# Patient Record
Sex: FEMALE | Race: WHITE | NOT HISPANIC OR LATINO | Employment: OTHER | URBAN - METROPOLITAN AREA
[De-identification: names, ages, dates, MRNs, and addresses within clinical notes are randomized per-mention and may not be internally consistent; named-entity substitution may affect disease eponyms.]

---

## 2000-11-27 LAB — HCV AB SER-ACNC: NEGATIVE

## 2018-07-22 ENCOUNTER — OFFICE VISIT (OUTPATIENT)
Dept: URGENT CARE | Facility: CLINIC | Age: 68
End: 2018-07-22
Payer: MEDICARE

## 2018-07-22 VITALS
SYSTOLIC BLOOD PRESSURE: 197 MMHG | TEMPERATURE: 98.6 F | HEIGHT: 62 IN | WEIGHT: 145 LBS | BODY MASS INDEX: 26.68 KG/M2 | RESPIRATION RATE: 18 BRPM | OXYGEN SATURATION: 98 % | DIASTOLIC BLOOD PRESSURE: 81 MMHG | HEART RATE: 79 BPM

## 2018-07-22 DIAGNOSIS — L23.7 ALLERGIC CONTACT DERMATITIS DUE TO PLANTS, EXCEPT FOOD: Primary | ICD-10-CM

## 2018-07-22 RX ORDER — FERROUS SULFATE 325(65) MG
325 TABLET ORAL
COMMUNITY

## 2018-07-22 RX ORDER — MOMETASONE FUROATE 1 MG/G
CREAM TOPICAL DAILY
Qty: 30 G | Refills: 0 | Status: SHIPPED | OUTPATIENT
Start: 2018-07-22 | End: 2018-07-27

## 2018-07-22 RX ORDER — CITALOPRAM 20 MG/1
10 TABLET ORAL DAILY
COMMUNITY
End: 2019-12-27 | Stop reason: SDUPTHER

## 2018-07-22 RX ORDER — METHYLPREDNISOLONE 4 MG/1
TABLET ORAL
Qty: 21 TABLET | Refills: 0 | Status: SHIPPED | OUTPATIENT
Start: 2018-07-22 | End: 2019-08-27 | Stop reason: ALTCHOICE

## 2018-07-22 NOTE — PROGRESS NOTES
Assessment/Plan:  Follow-up with your family physician if not better in 5 days  If the rash starts to involve the eye follow-up sooner  Diagnoses and all orders for this visit:    Allergic contact dermatitis due to plants, except food    Other orders  -     ferrous sulfate 325 (65 Fe) mg tablet; Take 325 mg by mouth daily with breakfast  -     citalopram (CeleXA) 20 mg tablet; Take 10 mg by mouth daily          Subjective:      Patient ID: Cresencio Villarreal is a 79 y o  female  Patient presents with:  Rash: Poison Ivy rash on left side of face and Inner arms since Friday             The following portions of the patient's history were reviewed and updated as appropriate: allergies, current medications, past family history, past medical history, past social history, past surgical history and problem list     Review of Systems   Constitutional: Negative  HENT: Negative  Eyes: Negative  Respiratory: Negative  Cardiovascular: Negative  Gastrointestinal: Negative  Endocrine: Negative  Genitourinary: Negative  Musculoskeletal: Negative  Skin: Positive for rash  Allergic/Immunologic: Negative  Neurological: Negative  Hematological: Negative  Psychiatric/Behavioral: Negative  All other systems reviewed and are negative  Objective:      BP (!) 197/81   Pulse 79   Temp 98 6 °F (37 °C)   Resp 18   Ht 5' 2" (1 575 m)   Wt 65 8 kg (145 lb)   SpO2 98%   BMI 26 52 kg/m²          Physical Exam   Constitutional: She is oriented to person, place, and time  She appears well-developed and well-nourished  HENT:   Head: Normocephalic and atraumatic  Right Ear: External ear normal    Left Ear: External ear normal    Nose: Nose normal    Mouth/Throat: Oropharynx is clear and moist    Eyes: Conjunctivae and EOM are normal  Pupils are equal, round, and reactive to light  Neck: Normal range of motion  Neck supple     Cardiovascular: Normal rate, regular rhythm and normal heart sounds  Pulmonary/Chest: Effort normal and breath sounds normal    Abdominal: Soft  Bowel sounds are normal    Musculoskeletal: Normal range of motion  Neurological: She is alert and oriented to person, place, and time  She has normal reflexes  Skin: Skin is warm and dry  Rash noted  Raised erythematous rash on the left side of her face and around the left eye  Also some raised areas on her anterior chest wall  No blistering no discharge  Psychiatric: She has a normal mood and affect  Her behavior is normal    Nursing note and vitals reviewed

## 2019-06-27 ENCOUNTER — TELEPHONE (OUTPATIENT)
Dept: FAMILY MEDICINE CLINIC | Facility: CLINIC | Age: 69
End: 2019-06-27

## 2019-08-27 ENCOUNTER — OFFICE VISIT (OUTPATIENT)
Dept: FAMILY MEDICINE CLINIC | Facility: CLINIC | Age: 69
End: 2019-08-27
Payer: COMMERCIAL

## 2019-08-27 VITALS
SYSTOLIC BLOOD PRESSURE: 128 MMHG | BODY MASS INDEX: 23.04 KG/M2 | DIASTOLIC BLOOD PRESSURE: 78 MMHG | HEIGHT: 63 IN | WEIGHT: 130 LBS

## 2019-08-27 DIAGNOSIS — K21.00 REFLUX ESOPHAGITIS: ICD-10-CM

## 2019-08-27 DIAGNOSIS — Z00.00 MEDICARE ANNUAL WELLNESS VISIT, SUBSEQUENT: Primary | ICD-10-CM

## 2019-08-27 DIAGNOSIS — Z12.31 ENCOUNTER FOR SCREENING MAMMOGRAM FOR BREAST CANCER: ICD-10-CM

## 2019-08-27 DIAGNOSIS — K21.00 GASTRO-ESOPHAGEAL REFLUX DISEASE WITH ESOPHAGITIS: Chronic | ICD-10-CM

## 2019-08-27 DIAGNOSIS — F41.1 GENERALIZED ANXIETY DISORDER: Chronic | ICD-10-CM

## 2019-08-27 DIAGNOSIS — R13.14 PHARYNGOESOPHAGEAL DYSPHAGIA: Chronic | ICD-10-CM

## 2019-08-27 DIAGNOSIS — J30.1 SEASONAL ALLERGIC RHINITIS DUE TO POLLEN: Chronic | ICD-10-CM

## 2019-08-27 DIAGNOSIS — D50.8 IRON DEFICIENCY ANEMIA SECONDARY TO INADEQUATE DIETARY IRON INTAKE: Chronic | ICD-10-CM

## 2019-08-27 DIAGNOSIS — E78.00 HYPERCHOLESTEROLEMIA: ICD-10-CM

## 2019-08-27 PROBLEM — L23.7 ALLERGIC CONTACT DERMATITIS DUE TO PLANTS, EXCEPT FOOD: Status: RESOLVED | Noted: 2018-07-22 | Resolved: 2019-08-27

## 2019-08-27 LAB
ALBUMIN SERPL BCP-MCNC: 4.6 G/DL (ref 3.5–5)
ANION GAP SERPL CALCULATED.3IONS-SCNC: 4 MMOL/L (ref 4–13)
BUN SERPL-MCNC: 13 MG/DL (ref 5–25)
CALCIUM SERPL-MCNC: 10.1 MG/DL (ref 8.3–10.1)
CHLORIDE SERPL-SCNC: 109 MMOL/L (ref 100–108)
CHOLEST SERPL-MCNC: 242 MG/DL (ref 50–200)
CO2 SERPL-SCNC: 29 MMOL/L (ref 21–32)
CREAT SERPL-MCNC: 0.68 MG/DL (ref 0.6–1.3)
GFR SERPL CREATININE-BSD FRML MDRD: 90 ML/MIN/1.73SQ M
GLUCOSE P FAST SERPL-MCNC: 78 MG/DL (ref 65–99)
HDLC SERPL-MCNC: 44 MG/DL (ref 40–60)
LDLC SERPL CALC-MCNC: 162 MG/DL (ref 0–100)
PHOSPHATE SERPL-MCNC: 3.4 MG/DL (ref 2.3–4.1)
POTASSIUM SERPL-SCNC: 4.8 MMOL/L (ref 3.5–5.3)
SODIUM SERPL-SCNC: 142 MMOL/L (ref 136–145)
TRIGL SERPL-MCNC: 180 MG/DL

## 2019-08-27 PROCEDURE — 36415 COLL VENOUS BLD VENIPUNCTURE: CPT | Performed by: FAMILY MEDICINE

## 2019-08-27 PROCEDURE — 1101F PT FALLS ASSESS-DOCD LE1/YR: CPT | Performed by: FAMILY MEDICINE

## 2019-08-27 PROCEDURE — 80061 LIPID PANEL: CPT | Performed by: FAMILY MEDICINE

## 2019-08-27 PROCEDURE — 80069 RENAL FUNCTION PANEL: CPT | Performed by: FAMILY MEDICINE

## 2019-08-27 PROCEDURE — G0438 PPPS, INITIAL VISIT: HCPCS | Performed by: FAMILY MEDICINE

## 2019-08-27 PROCEDURE — 99214 OFFICE O/P EST MOD 30 MIN: CPT | Performed by: FAMILY MEDICINE

## 2019-08-27 NOTE — PROGRESS NOTES
Assessment and Plan:     Problem List Items Addressed This Visit     None      Visit Diagnoses     Encounter for screening mammogram for breast cancer    -  Primary         History of Present Illness:     Patient presents for Medicare Annual Wellness visit    Patient Care Team:  Mike Kinsey MD as PCP - General (Family Medicine)     Problem List:     Patient Active Problem List   Diagnosis    Allergic contact dermatitis due to plants, except food      Past Medical and Surgical History:     Past Medical History:   Diagnosis Date    Depression     Esophageal stricture     History of gastroesophageal reflux (GERD)     Iron deficiency anemia      Past Surgical History:   Procedure Laterality Date    COLONOSCOPY  2012    GALLBLADDER SURGERY      HYSTERECTOMY      OTHER SURGICAL HISTORY      Endoscopy of stricture 2010, 2012, 5/2014 - Esophageal ulcer; 7/2014 - Esophageal ulcer; 10/2015 (Dr Edin Palencia) - Esophageal stricture; 10/2016; 11/2017 - Stricture      Family History:     Family History   Problem Relation Age of Onset   Razo Stroke Mother     Hypertension Mother       Social History:     Social History     Tobacco Use   Smoking Status Never Smoker   Smokeless Tobacco Never Used     Social History     Substance and Sexual Activity   Alcohol Use Not Currently    Comment: Denies alcohol use - As per [facility]      Social History     Substance and Sexual Activity   Drug Use Not Currently      Medications and Allergies:     Current Outpatient Medications   Medication Sig Dispense Refill    citalopram (CeleXA) 20 mg tablet Take 10 mg by mouth daily      ferrous sulfate 325 (65 Fe) mg tablet Take 325 mg by mouth daily with breakfast      mometasone (ELOCON) 0 1 % cream Apply topically daily for 5 days 30 g 0     No current facility-administered medications for this visit        No Known Allergies   Immunizations:     Immunization History   Administered Date(s) Administered    INFLUENZA 10/29/2007, 03/04/2010, 09/08/2010, 12/01/2011, 08/23/2012, 12/06/2013, 10/02/2014, 11/23/2015, 11/28/2016, 12/01/2017    Tdap 06/02/2014      Medicare Screening Tests and Risk Assessments:     Mahogany Pineda is here for her Subsequent Wellness visit  Last Medicare Wellness visit information reviewed, patient interviewed and updates made to the record today  Health Risk Assessment:  Patient rates overall health as good  Patient feels that their physical health rating is Same  Eyesight was rated as Same  Hearing was rated as Same  Patient feels that their emotional and mental health rating is Same  Pain experienced by patient in the last 7 days has been None  Patient states that she has experienced no weight loss or gain in last 6 months  (Additional comments: No issues)    Emotional/Mental Health:  Patient has not been feeling nervous/anxious  PHQ-9 Depression Screening:    Frequency of the following problems over the past two weeks:      1  Little interest or pleasure in doing things: 0 - not at all      2  Feeling down, depressed, or hopeless: 0 - not at all  PHQ-2 Score: 0          Broken Bones/Falls: Fall Risk Assessment:    In the past year, patient has experienced: No history of falling in past year          Bladder/Bowel:  Patient has not leaked urine accidently in the last six months  Patient reports no loss of bowel control  (Additional Comments: No issues)    Immunizations:  Patient has had a flu vaccination within the last year  Patient has not received a pneumonia shot  Patient has not received a shingles shot  Patient has received tetanus/diphtheria shot  (Additional Comments: Advised to get the pneumonia shot and also to consider the shingles vaccine)    Home Safety:  Patient does not have trouble with stairs inside or outside of their home  Patient currently reports that there are no safety hazards present in home, working smoke alarms, no working carbon monoxide detectors    (Additional Comments: No issues)    Preventative Screenings:   No breast cancer screening performed, colon cancer screen completed, cholesterol screen completed, glaucoma eye exam completed, (Additional Comments: Gave slip for mammogram   Continue to have reports from my doctor sent)    Nutrition:  Current diet: Regular with servings of the following:  (Additional Comments: No issues)    Medications:  Patient is currently taking over-the-counter supplements  List of OTC medications includes: Iron supplement and multivitamin  Patient is able to manage medications  Lifestyle Choices:  Patient reports no tobacco use  Patient has not smoked or used tobacco in the past   Patient reports no alcohol use  Patient drives a vehicle  Patient wears seat belt  (Additional Comments: No issues)    Activities of Daily Living:  Can get out of bed by his or her self, able to dress self, able to make own meals, able to do own shopping, able to bathe self, can do own laundry/housekeeping, can manage own money, pay bills and track expensesAdditional Comments: Overall patient is very functional    Previous Hospitalizations:  No hospitalization or ED visit in past 12 months  Additional Comments: Follows up with Dr Shelby Gómez for the dysphagia  I mg    Advanced Directives:  Patient has not decided on power of   Patient has not completed advanced directive    Additional Comments: I advised her to do this and I did give her a form for the living willSocial Support: Patient has good social support    Preventative Screening/Counseling:      Cardiovascular:      General: Screening Current          Diabetes:      General: Screening Current          Colorectal Cancer:      General: Screening Current          Cervical Cancer:      General: Screening Not Indicated          Osteoporosis:      General: Screening Not Indicated          AAA:      General: Screening Not Indicated          Glaucoma:      General: Screening Current          HIV:      General: Screening Not Indicated          Hepatitis C:      General: Screening Not Indicated        Advanced Directives:   Patient has no living will for healthcare, does not have durable POA for healthcare, patient does not have an advanced directive     Additional Comments: I advised her to do this and I gave her a form for a living will  Immunizations:      Influenza: Influenza UTD This Year      Pneumococcal: Risks & Benefits Discussed      Shingrix: Risks & Benefits Discussed      Hepatitis B (Low risk patients): Series Not Indicated      TDAP: Risks & Benefits Discussed

## 2019-08-27 NOTE — PROGRESS NOTES
Assessment/Plan:    No problem-specific Assessment & Plan notes found for this encounter  Diagnoses and all orders for this visit:    Medicare annual wellness visit, subsequent    Encounter for screening mammogram for breast cancer  -     Mammo screening bilateral w 3d & cad; Future    Hypercholesterolemia  Comments:  Continue to push fiber and activity  Orders:  -     Renal function panel; Future  -     Lipid Panel with Direct LDL reflex; Future  -     Renal function panel  -     Lipid Panel with Direct LDL reflex    Reflux esophagitis  Comments:  Doing well continue current regimen  Orders:  -     Renal function panel; Future  -     Lipid Panel with Direct LDL reflex; Future  -     Renal function panel  -     Lipid Panel with Direct LDL reflex    Gastro-esophageal reflux disease with esophagitis    Iron deficiency anemia secondary to inadequate dietary iron intake  Comments:  Doing well continue current supplement    Seasonal allergic rhinitis due to pollen  Comments:  Doing well continue p r n  Meds    Generalized anxiety disorder  Comments:  Overall doing well continue current regimen  Did discuss overall activity and how to reduce stress with that    Pharyngoesophageal dysphagia  Comments:  Continue to be very careful with the size of bites and continue overall follow-up  Reviewed recent report          Subjective:      Patient ID: Pati Schlatter is a 76 y o  female  Patient has history of reflux esophagitis, dysphagia, allergic rhinitis, anemia and stress disorder overall has been doing well      The following portions of the patient's history were reviewed and updated as appropriate: allergies, current medications, past family history, past medical history, past social history, past surgical history and problem list     Review of Systems   Constitutional: Negative for activity change, appetite change, chills, fatigue, fever and unexpected weight change     HENT: Positive for hearing loss and trouble swallowing (Is very careful about the size of food she eats with her bites)  Negative for congestion, dental problem, rhinorrhea and voice change  Eyes: Negative for visual disturbance  Respiratory: Negative for apnea, cough, chest tightness and shortness of breath  Cardiovascular: Negative for chest pain, palpitations and leg swelling  Gastrointestinal: Negative for abdominal distention, abdominal pain, constipation and diarrhea  Endocrine: Negative for polyuria ( nocturia x1)  Genitourinary: Negative for difficulty urinating and enuresis  Musculoskeletal: Negative for arthralgias and myalgias  Skin: Negative for rash  Allergic/Immunologic: Positive for environmental allergies  Neurological: Negative for dizziness, weakness, light-headedness, numbness and headaches  Hematological: Negative for adenopathy  Does not bruise/bleed easily  Psychiatric/Behavioral: Negative for agitation and dysphoric mood  Objective:      /78 (BP Location: Left arm, Patient Position: Sitting, Cuff Size: Standard)   Ht 5' 3" (1 6 m)   Wt 59 kg (130 lb)   BMI 23 03 kg/m²          Physical Exam   Constitutional: She appears well-developed and well-nourished  HENT:   Head: Normocephalic  Right Ear: Tympanic membrane, external ear and ear canal normal  Decreased hearing (Partial hearing loss a 25 decibels no difficulty with conversation) is noted  Left Ear: Tympanic membrane, external ear and ear canal normal  Decreased hearing ( partial hearing loss) is noted  Nose: Nose normal    Mouth/Throat: Oropharynx is clear and moist    Eyes: Pupils are equal, round, and reactive to light  Conjunctivae and EOM are normal    Neck: Normal range of motion  Neck supple  No thyromegaly present  Cardiovascular: Normal rate, regular rhythm, normal heart sounds and intact distal pulses  No murmur ( rate is 66 no bruits are noted) heard    Pulses:       Dorsalis pedis pulses are 1+ on the right side, and 1+ on the left side  Posterior tibial pulses are 1+ on the right side, and 1+ on the left side  Pulmonary/Chest: Effort normal and breath sounds normal    Abdominal: Soft  She exhibits no distension and no mass  There is no tenderness  Musculoskeletal: She exhibits no edema  Feet:   Right Foot:   Protective Sensation: 8 sites tested  8 sites sensed  Skin Integrity: Negative for callus  Left Foot:   Protective Sensation: 8 sites tested  Skin Integrity: Negative for callus  Lymphadenopathy:     She has no cervical adenopathy  Neurological: She is alert  She displays normal reflexes  No cranial nerve deficit or sensory deficit  She exhibits normal muscle tone  Coordination normal    Skin: Skin is warm and dry  Capillary refill takes 2 to 3 seconds  No rash noted  Psychiatric: She has a normal mood and affect  Her behavior is normal  Judgment and thought content normal    Nursing note and vitals reviewed

## 2019-08-27 NOTE — PATIENT INSTRUCTIONS
Obesity   AMBULATORY CARE:   Obesity  is when your body mass index (BMI) is greater than 30  Your healthcare provider will use your height and weight to measure your BMI  The risks of obesity include  many health problems, such as injuries or physical disability  You may need tests to check for the following:  · Diabetes     · High blood pressure or high cholesterol     · Heart disease     · Gallbladder or liver disease     · Cancer of the colon, breast, prostate, liver, or kidney     · Sleep apnea     · Arthritis or gout  Seek care immediately if:   · You have a severe headache, confusion, or difficulty speaking  · You have weakness on one side of your body  · You have chest pain, sweating, or shortness of breath  Contact your healthcare provider if:   · You have symptoms of gallbladder or liver disease, such as pain in your upper abdomen  · You have knee or hip pain and discomfort while walking  · You have symptoms of diabetes, such as intense hunger and thirst, and frequent urination  · You have symptoms of sleep apnea, such as snoring or daytime sleepiness  · You have questions or concerns about your condition or care  Treatment for obesity  focuses on helping you lose weight to improve your health  Even a small decrease in BMI can reduce the risk for many health problems  Your healthcare provider will help you set a weight-loss goal   · Lifestyle changes  are the first step in treating obesity  These include making healthy food choices and getting regular physical activity  Your healthcare provider may suggest a weight-loss program that involves coaching, education, and therapy  · Medicine  may help you lose weight when it is used with a healthy diet and physical activity  · Surgery  can help you lose weight if you are very obese and have other health problems  There are several types of weight-loss surgery  Ask your healthcare provider for more information    Be successful losing weight:   · Set small, realistic goals  An example of a small goal is to walk for 20 minutes 5 days a week  Anther goal is to lose 5% of your body weight  · Tell friends, family members, and coworkers about your goals  and ask for their support  Ask a friend to lose weight with you, or join a weight-loss support group  · Identify foods or triggers that may cause you to overeat , and find ways to avoid them  Remove tempting high-calorie foods from your home and workplace  Place a bowl of fresh fruit on your kitchen counter  If stress causes you to eat, then find other ways to cope with stress  · Keep a diary to track what you eat and drink  Also write down how many minutes of physical activity you do each day  Weigh yourself once a week and record it in your diary  Eating changes: You will need to eat 500 to 1,000 fewer calories each day than you currently eat to lose 1 to 2 pounds a week  The following changes will help you cut calories:  · Eat smaller portions  Use small plates, no larger than 9 inches in diameter  Fill your plate half full of fruits and vegetables  Measure your food using measuring cups until you know what a serving size looks like  · Eat 3 meals and 1 or 2 snacks each day  Plan your meals in advance  [place] and eat at home most of the time  Eat slowly  · Eat fruits and vegetables at every meal   They are low in calories and high in fiber, which makes you feel full  Do not add butter, margarine, or cream sauce to vegetables  Use herbs to season steamed vegetables  · Eat less fat and fewer fried foods  Eat more baked or grilled chicken and fish  These protein sources are lower in calories and fat than red meat  Limit fast food  Dress your salads with olive oil and vinegar instead of bottled dressing  · Limit the amount of sugar you eat  Do not drink sugary beverages  Limit alcohol  Activity changes:  Physical activity is good for your body in many ways   It helps you burn calories and build strong muscles  It decreases stress and depression, and improves your mood  It can also help you sleep better  Talk to your healthcare provider before you begin an exercise program   · Exercise for at least 30 minutes 5 days a week  Start slowly  Set aside time each day for physical activity that you enjoy and that is convenient for you  It is best to do both weight training and an activity that increases your heart rate, such as walking, bicycling, or swimming  · Find ways to be more active  Do yard work and housecleaning  Walk up the stairs instead of using elevators  Spend your leisure time going to events that require walking, such as outdoor festivals or fairs  This extra physical activity can help you lose weight and keep it off  Follow up with your healthcare provider as directed: You may need to meet with a dietitian  Write down your questions so you remember to ask them during your visits  © 2017 [employer] Information is for End User's use only and may not be sold, redistributed or otherwise used for commercial purposes  All illustrations and images included in CareNotes® are the copyrighted property of [employer]  or Ev[employer]  The above information is an  only  It is not intended as medical advice for individual conditions or treatments  Talk to your doctor, nurse or pharmacist before following any medical regimen to see if it is safe and effective for you  Urinary Incontinence   WHAT YOU NEED TO KNOW:   What is urinary incontinence? Urinary incontinence (UI) is when you lose control of your bladder  What causes UI? UI occurs because your bladder cannot store or empty urine properly  The following are the most common types of UI:  · Stress incontinence  is when you leak urine due to increased bladder pressure  This may happen when you cough, sneeze, or exercise       · Urge incontinence  is when you feel the need to urinate right away and leak urine accidentally  · Mixed incontinence  is when you have both stress and urge UI  What are the signs and symptoms of UI?   · You feel like your bladder does not empty completely when you urinate  · You urinate often and need to urinate immediately  · You leak urine when you sleep, or you wake up with the urge to urinate  · You leak urine when you cough, sneeze, exercise, or laugh  How is UI diagnosed? Your healthcare provider will ask how often you leak urine and whether you have stress or urge symptoms  Tell him which medicines you take, how often you urinate, and how much liquid you drink each day  You may need any of the following tests:  · Urine tests  may show infection or kidney function  · A pelvic exam  may be done to check for blockages  A pelvic exam will also show if your bladder, uterus, or other organs have moved out of place  · An x-ray, ultrasound, or CT  may show problems with parts of your urinary system  You may be given contrast liquid to help your organs show up better in the pictures  Tell the healthcare provider if you have ever had an allergic reaction to contrast liquid  Do not enter the MRI room with anything metal  Metal can cause serious injury  Tell the healthcare provider if you have any metal in or on your body  · A bladder scan  will show how much urine is left in your bladder after you urinate  You will be asked to urinate and then healthcare providers will use a small ultrasound machine to check the urine left in your bladder  · Cystometry  is used to check the function of your urinary system  Your healthcare provider checks the pressure in your bladder while filling it with fluid  Your bladder pressure may also be tested when your bladder is full and while you urinate  How is UI treated? · Medicines  can help strengthen your bladder control      · Electrical stimulation  is used to send a small amount of electrical energy to your pelvic floor muscles  This helps control your bladder function  Electrodes may be placed outside your body or in your rectum  For women, the electrodes may be placed in the vagina  · A bulking agent  may be injected into the wall of your urethra to make it thicker  This helps keep your urethra closed and decreases urine leakage  · Devices  such as a clamp, pessary, or tampon may help stop urine leaks  Ask your healthcare provider for more information about these and other devices  · Surgery  may be needed if other treatments do not work  Several types of surgery can help improve your bladder control  Ask your healthcare provider for more information about the surgery you may need  How can I manage my symptoms? · Do pelvic muscle exercises often  Your pelvic muscles help you stop urinating  Squeeze these muscles tight for 5 seconds, then relax for 5 seconds  Gradually work up to squeezing for 10 seconds  Do 3 sets of 15 repetitions a day, or as directed  This will help strengthen your pelvic muscles and improve bladder control  · A catheter  may be used to help empty your bladder  A catheter is a tiny, plastic tube that is put into your bladder to drain your urine  Your healthcare provider may tell you to use a catheter to prevent your bladder from getting too full and leaking urine  · Keep a UI record  Write down how often you leak urine and how much you leak  Make a note of what you were doing when you leaked urine  · Train your bladder  Go to the bathroom at set times, such as every 2 hours, even if you do not feel the urge to go  You can also try to hold your urine when you feel the urge to go  For example, hold your urine for 5 minutes when you feel the urge to go  As that becomes easier, hold your urine for 10 minutes  · Drink liquids as directed  Ask your healthcare provider how much liquid to drink each day and which liquids are best for you   You may need to limit the amount of liquid you drink to help control your urine leakage  Limit or do not have drinks that contain caffeine or alcohol  Do not drink any liquid right before you go to bed  · Prevent constipation  Eat a variety of high-fiber foods  Good examples are high-fiber cereals, beans, vegetables, and whole-grain breads  Prune juice may help make your bowel movement softer  Walking is the best way to trigger your intestines to have a bowel movement  · Exercise regularly and maintain a healthy weight  Ask your healthcare provider how much you should weigh and about the best exercise plan for you  Weight loss and exercise will decrease pressure on your bladder and help you control your leakage  Ask him to help you create a weight loss plan if you are overweight  When should I seek immediate care? · You have severe pain  · You are confused or cannot think clearly  When should I contact my healthcare provider? · You have a fever  · You see blood in your urine  · You have pain when you urinate  · You have new or worse pain, even after treatment  · Your mouth feels dry or you have vision changes  · Your urine is cloudy or smells bad  · You have questions or concerns about your condition or care  CARE AGREEMENT:   You have the right to help plan your care  Learn about your health condition and how it may be treated  Discuss treatment options with your caregivers to decide what care you want to receive  You always have the right to refuse treatment  The above information is an  only  It is not intended as medical advice for individual conditions or treatments  Talk to your doctor, nurse or pharmacist before following any medical regimen to see if it is safe and effective for you  © 2017 [employer] Information is for End User's use only and may not be sold, redistributed or otherwise used for commercial purposes   All illustrations and images included in CareNotes® are the copyrighted property of [employer]  or [employer] Lopez  Cigarette Smoking and Your Health   AMBULATORY CARE:   Risks to your health if you smoke:  Nicotine and other chemicals found in tobacco damage every cell in your body  Even if you are a light smoker, you have an increased risk for cancer, heart disease, and lung disease  If you are pregnant or have diabetes, smoking increases your risk for complications  Benefits to your health if you stop smoking:   · You decrease respiratory symptoms such as coughing, wheezing, and shortness of breath  · You reduce your risk for cancers of the lung, mouth, throat, kidney, bladder, pancreas, stomach, and cervix  If you already have cancer, you increase the benefits of chemotherapy  You also reduce your risk for cancer returning or a second cancer from developing  · You reduce your risk for heart disease, blood clots, heart attack, and stroke  · You reduce your risk for lung infections, and diseases such as pneumonia, asthma, chronic bronchitis, and emphysema  · Your circulation improves  More oxygen can be delivered to your body  If you have diabetes, you lower your risk for complications, such as kidney, artery, and eye diseases  You also lower your risk for nerve damage  Nerve damage can lead to amputations, poor vision, and blindness  · You improve your body's ability to heal and to fight infections  Benefits to the health of others if you stop smoking:  Tobacco is harmful to nonsmokers who breathe in your secondhand smoke  The following are ways the health of others around you may improve when you stop smoking:  · You lower the risks for lung cancer and heart disease in nonsmoking adults  · If you are pregnant, you lower the risk for miscarriage, early delivery, low birth weight, and stillbirth  You also lower your baby's risk for SIDS, obesity, developmental delay, and neurobehavioral problems, such as ADHD  · If you have children, you lower their risk for ear infections, colds, pneumonia, bronchitis, and asthma  For more information and support to stop smoking:   · Smokefree  gov  Phone: 5- 269 - 401-7713  Web Address: www smokefree  gov  Follow up with your healthcare provider as directed:  Write down your questions so you remember to ask them during your visits  © 2017 [address] [employer] Information is for End User's use only and may not be sold, redistributed or otherwise used for commercial purposes  All illustrations and images included in CareNotes® are the copyrighted property of [employer]  or Ev[employer] Lopez  The above information is an  only  It is not intended as medical advice for individual conditions or treatments  Talk to your doctor, nurse or pharmacist before following any medical regimen to see if it is safe and effective for you  Fall Prevention   AMBULATORY CARE:   Fall prevention  includes ways to make your home and other areas safer  It also includes ways you can move more carefully to prevent a fall  Health conditions that cause changes in your blood pressure, vision, or muscle strength and coordination may increase your risk for falls  Medicines may also increase your risk for falls if they make you dizzy, weak, or sleepy  Call 911 or have someone else call if:   · You have fallen and are unconscious  · You have fallen and cannot move part of your body  Contact your healthcare provider if:   · You have fallen and have pain or a headache  · You have questions or concerns about your condition or care  Fall prevention tips:   · Stand or sit up slowly  This may help you keep your balance and prevent falls  · Use assistive devices as directed  Your healthcare provider may suggest that you use a cane or walker to help you keep your balance  You may need to have grab bars put in your bathroom near the toilet or in the shower      · Wear shoes that fit well and have soles that   Wear shoes both inside and outside  Use slippers with good   Do not wear shoes with high heels  · Wear a personal alarm  This is a device that allows you to call 911 if you fall and need help  Ask your healthcare provider for more information  · Stay active  Exercise can help strengthen your muscles and improve your balance  Your healthcare provider may recommend water aerobics or walking  He or she may also recommend physical therapy to improve your coordination  Never start an exercise program without talking to your healthcare provider first      · Manage your medical conditions  Keep all appointments with your healthcare providers  Visit your eye doctor as directed  Home safety tips:   · Add items to prevent falls in the bathroom  Put nonslip strips on your bath or shower floor to prevent you from slipping  Use a bath mat if you do not have carpet in the bathroom  This will prevent you from falling when you step out of the bath or shower  Use a shower seat so you do not need to stand while you shower  Sit on the toilet or a chair in your bathroom to dry yourself and put on clothing  This will prevent you from losing your balance from drying or dressing yourself while you are standing  · Keep paths clear  Remove books, shoes, and other objects from walkways and stairs  Place cords for telephones and lamps out of the way so that you do not need to walk over them  Tape them down if you cannot move them  Remove small rugs  If you cannot remove a rug, secure it with double-sided tape  This will prevent you from tripping  · Install bright lights in your home  Use night lights to help light paths to the bathroom or kitchen  Always turn on the light before you start walking  · Keep items you use often on shelves within reach  Do not use a step stool to help you reach an item  · Paint or place reflective tape on the edges of your stairs    This will help you see the stairs better  Follow up with your healthcare provider as directed:  Write down your questions so you remember to ask them during your visits  © 2017 [address] [employer] Information is for End User's use only and may not be sold, redistributed or otherwise used for commercial purposes  All illustrations and images included in CareNotes® are the copyrighted property of [employer]  or [employer]  The above information is an  only  It is not intended as medical advice for individual conditions or treatments  Talk to your doctor, nurse or pharmacist before following any medical regimen to see if it is safe and effective for you  Advance Directives   WHAT YOU NEED TO KNOW:   What are advance directives? Advance directives are legal documents that state your wishes and plans for medical care  These plans are made ahead of time in case you lose your ability to make decisions for yourself  Advance directives can apply to any medical decision, such as the treatments you want, and if you want to donate organs  What are the types of advance directives? There are many types of advance directives, and each state has rules about how to use them  You may choose a combination of any of the following:  · Living will: This is a written record of the treatment you want  You can also choose which treatments you do not want, which to limit, and which to stop at a certain time  This includes surgery, medicine, IV fluid, and tube feedings  · Durable power of  for healthcare [facility]): This is a written record that states who you want to make healthcare choices for you when you are unable to make them for yourself  This person, called a proxy, is usually a family member or a friend  You may choose more than 1 proxy  · Do not resuscitate (DNR) order:  A DNR order is used in case your heart stops beating or you stop breathing   It is a request not to have certain forms of treatment, such as CPR  A DNR order may be included in other types of advance directives  · Medical directive: This covers the care that you want if you are in a coma, near death, or unable to make decisions for yourself  You can list the treatments you want for each condition  Treatment may include pain medicine, surgery, blood transfusions, dialysis, IV or tube feedings, and a ventilator (breathing machine)  · Values history: This document has questions about your views, beliefs, and how you feel and think about life  This information can help others choose the care that you would choose  Why are advance directives important? An advance directive helps you control your care  Although spoken wishes may be used, it is better to have your wishes written down  Spoken wishes can be misunderstood, or not followed  Treatments may be given even if you do not want them  An advance directive may make it easier for your family to make difficult choices about your care  How do I decide what to put in my advance directives? · Make informed decisions:  Make sure you fully understand treatments or care you may receive  Think about the benefits and problems your decisions could cause for you or your family  Talk to healthcare providers if you have concerns or questions before you write down your wishes  You may also want to talk with your Denominational or , or a   Check your state laws to make sure that what you put in your advance directive is legal      · Sign all forms:  Sign and date your advance directive when you have finished  You may also need 2 witnesses to sign the forms  Witnesses cannot be your doctor or his staff, your spouse, heirs or beneficiaries, people you owe money to, or your chosen proxy  Talk to your family, proxy, and healthcare providers about your advance directive  Give each person a copy, and keep one for yourself in a place you can get to easily   Do not keep it hidden or locked away  · Review and revise your plans: You can revise your advance directive at any time, as long as you are able to make decisions  Review your plan every year, and when there are changes in your life, or your health  When you make changes, let your family, proxy, and healthcare providers know  Give each a new copy  Where can I find more information? · [employer]  [address] , [place] [address]  Phone: 3- 973 - [phone]  Phone: 8- 488 - [phone]  Web Address: http://www  aafp org  · [address] [facility])  [address] [address] [facility], [address] [facility] , [address] [place] [address]  Phone: 7- [phone]  Phone: [phone]  Web Address: Clarita aparicio  CARE AGREEMENT:   You have the right to help plan your care  To help with this plan, you must learn about your health condition and treatment options  You must also learn about advance directives and how they are used  Work with your healthcare providers to decide what care will be used to treat you  You always have the right to refuse treatment  The above information is an  only  It is not intended as medical advice for individual conditions or treatments  Talk to your doctor, nurse or pharmacist before following any medical regimen to see if it is safe and effective for you  © 2017 [address] [employer]  Information is for End User's use only and may not be sold, redistributed or otherwise used for commercial purposes  All illustrations and images included in CareNotes® are the copyrighted property of [employer]  or [employer] Lopez     overall patient is very functional   Advised to get flu shot in the fall and continue current meds as is will get screening mammogram

## 2019-12-27 ENCOUNTER — OFFICE VISIT (OUTPATIENT)
Dept: FAMILY MEDICINE CLINIC | Facility: CLINIC | Age: 69
End: 2019-12-27
Payer: COMMERCIAL

## 2019-12-27 VITALS
WEIGHT: 129 LBS | HEIGHT: 63 IN | SYSTOLIC BLOOD PRESSURE: 140 MMHG | DIASTOLIC BLOOD PRESSURE: 90 MMHG | BODY MASS INDEX: 22.86 KG/M2

## 2019-12-27 DIAGNOSIS — J30.1 SEASONAL ALLERGIC RHINITIS DUE TO POLLEN: Chronic | ICD-10-CM

## 2019-12-27 DIAGNOSIS — K21.00 GASTRO-ESOPHAGEAL REFLUX DISEASE WITH ESOPHAGITIS: Chronic | ICD-10-CM

## 2019-12-27 DIAGNOSIS — F32.1 CURRENT MODERATE EPISODE OF MAJOR DEPRESSIVE DISORDER WITHOUT PRIOR EPISODE (HCC): ICD-10-CM

## 2019-12-27 DIAGNOSIS — D50.8 IRON DEFICIENCY ANEMIA SECONDARY TO INADEQUATE DIETARY IRON INTAKE: Chronic | ICD-10-CM

## 2019-12-27 DIAGNOSIS — R13.14 PHARYNGOESOPHAGEAL DYSPHAGIA: Primary | Chronic | ICD-10-CM

## 2019-12-27 PROCEDURE — 99214 OFFICE O/P EST MOD 30 MIN: CPT | Performed by: FAMILY MEDICINE

## 2019-12-27 PROCEDURE — 1036F TOBACCO NON-USER: CPT | Performed by: FAMILY MEDICINE

## 2019-12-27 PROCEDURE — 3008F BODY MASS INDEX DOCD: CPT | Performed by: FAMILY MEDICINE

## 2019-12-27 PROCEDURE — 1160F RVW MEDS BY RX/DR IN RCRD: CPT | Performed by: FAMILY MEDICINE

## 2019-12-27 RX ORDER — OMEPRAZOLE 20 MG/1
20 CAPSULE, DELAYED RELEASE ORAL DAILY
COMMUNITY
End: 2021-05-24 | Stop reason: SDUPTHER

## 2019-12-27 RX ORDER — ARIPIPRAZOLE 2 MG/1
2 TABLET ORAL DAILY
Qty: 30 TABLET | Refills: 5 | Status: SHIPPED | OUTPATIENT
Start: 2019-12-27 | End: 2020-11-17 | Stop reason: ALTCHOICE

## 2019-12-27 RX ORDER — CITALOPRAM 20 MG/1
10 TABLET ORAL DAILY
Qty: 30 TABLET | Refills: 5 | Status: SHIPPED | OUTPATIENT
Start: 2019-12-27 | End: 2020-07-27 | Stop reason: SDUPTHER

## 2019-12-27 NOTE — PATIENT INSTRUCTIONS
Discussed the problem with the depression    Will add Abilify 2 mg to be taken with evening meal   Will continue all other meds as is patient is going to return in 1 week to repeat flu shot

## 2019-12-27 NOTE — ASSESSMENT & PLAN NOTE
Discussed problem will add Abilify 2 mg to be taken with evening meal to the Celexa and will re-evaluate in 2 weeks

## 2019-12-27 NOTE — PROGRESS NOTES
Assessment/Plan:    Pharyngoesophageal dysphagia  Is going to need to check back with GI    Gastro-esophageal reflux disease with esophagitis  Does not have actual heartburn should just continue current meds at this point    Iron deficiency anemia secondary to inadequate dietary iron intake  Having some trouble with constipation continue iron supplement    Current moderate episode of major depressive disorder without prior episode ([facility] )  Discussed problem will add Abilify 2 mg to be taken with evening meal to the Celexa and will re-evaluate in 2 weeks    Seasonal allergic rhinitis due to pollen  Doing well continue p r n  Meds       Diagnoses and all orders for this visit:    Pharyngoesophageal dysphagia    Gastro-esophageal reflux disease with esophagitis    Current moderate episode of major depressive disorder without prior episode ([facility])  -     citalopram (CeleXA) 20 mg tablet; Take 0 5 tablets (10 mg total) by mouth daily  -     ARIPiprazole (ABILIFY) 2 mg tablet; Take 1 tablet (2 mg total) by mouth daily    Iron deficiency anemia secondary to inadequate dietary iron intake    Seasonal allergic rhinitis due to pollen    Other orders  -     omeprazole (PriLOSEC) 20 mg delayed release capsule; Take 20 mg by mouth daily          Subjective:      Patient ID: Jeane Bailey is a 71 y o  female  Patient has history of reflux esophagitis, dysphagia, allergic rhinitis, anemia and stress disorder has been having a lot of trouble with depression over the last several weeks has been continuing on her basic medications also is having more trouble swallowing      The following portions of the patient's history were reviewed and updated as appropriate: allergies, current medications, past medical history, past social history, past surgical history and problem list     Review of Systems   Constitutional: Positive for activity change, appetite change and fatigue  Negative for chills and fever     HENT: Positive for trouble swallowing  Eyes: Negative for visual disturbance  Respiratory: Negative for cough, chest tightness and shortness of breath  Cardiovascular: Positive for palpitations  Negative for chest pain  Gastrointestinal: Positive for constipation and nausea  Negative for diarrhea  Endocrine: Positive for polyuria (Nocturia x2)  Genitourinary: Negative for dysuria  Musculoskeletal: Negative for arthralgias  Skin: Negative for rash  Allergic/Immunologic: Positive for environmental allergies  Neurological: Positive for light-headedness  Negative for dizziness  Hematological: Negative for adenopathy  Psychiatric/Behavioral: Positive for decreased concentration, dysphoric mood, sleep disturbance and suicidal ideas  Objective:      /90 (BP Location: Left arm, Patient Position: Sitting, Cuff Size: Standard)   Ht 5' 3" (1 6 m)   Wt 58 5 kg (129 lb)   BMI 22 85 kg/m²          Physical Exam   Constitutional: She appears well-developed and well-nourished  HENT:   Head: Normocephalic  Nose: Nose normal    Mouth/Throat: Oropharynx is clear and moist    Eyes: Conjunctivae are normal    Neck: Neck supple  No thyromegaly present  Cardiovascular: Normal rate and regular rhythm  Murmur (Soft systolic murmur at left sternal border heart rate is 78 no bruits are noted) heard  Pulmonary/Chest: Effort normal and breath sounds normal    Abdominal: Soft  She exhibits no distension and no mass  There is no tenderness  Musculoskeletal: She exhibits no edema  Lymphadenopathy:     She has no cervical adenopathy  Neurological: She is alert  She displays normal reflexes  Coordination normal    Skin: Skin is warm and dry  Psychiatric: Her speech is normal and behavior is normal  Judgment normal  Her affect is labile  Cognition and memory are normal  She exhibits a depressed mood  She expresses suicidal ideation  She expresses no suicidal plans  Nursing note and vitals reviewed

## 2020-01-10 ENCOUNTER — OFFICE VISIT (OUTPATIENT)
Dept: FAMILY MEDICINE CLINIC | Facility: CLINIC | Age: 70
End: 2020-01-10
Payer: COMMERCIAL

## 2020-01-10 VITALS — WEIGHT: 129 LBS | DIASTOLIC BLOOD PRESSURE: 86 MMHG | BODY MASS INDEX: 22.85 KG/M2 | SYSTOLIC BLOOD PRESSURE: 146 MMHG

## 2020-01-10 DIAGNOSIS — F32.1 CURRENT MODERATE EPISODE OF MAJOR DEPRESSIVE DISORDER WITHOUT PRIOR EPISODE (HCC): ICD-10-CM

## 2020-01-10 DIAGNOSIS — K59.03 DRUG-INDUCED CONSTIPATION: Primary | Chronic | ICD-10-CM

## 2020-01-10 DIAGNOSIS — R13.14 PHARYNGOESOPHAGEAL DYSPHAGIA: Chronic | ICD-10-CM

## 2020-01-10 DIAGNOSIS — Z23 NEEDS FLU SHOT: ICD-10-CM

## 2020-01-10 PROCEDURE — G0008 ADMIN INFLUENZA VIRUS VAC: HCPCS | Performed by: FAMILY MEDICINE

## 2020-01-10 PROCEDURE — 1036F TOBACCO NON-USER: CPT | Performed by: FAMILY MEDICINE

## 2020-01-10 PROCEDURE — 1160F RVW MEDS BY RX/DR IN RCRD: CPT | Performed by: FAMILY MEDICINE

## 2020-01-10 PROCEDURE — 99213 OFFICE O/P EST LOW 20 MIN: CPT | Performed by: FAMILY MEDICINE

## 2020-01-10 PROCEDURE — 90682 RIV4 VACC RECOMBINANT DNA IM: CPT | Performed by: FAMILY MEDICINE

## 2020-01-10 NOTE — ASSESSMENT & PLAN NOTE
I feel this relates to the iron supplement    Should use Metamucil 1 tbsp of orange flavored in 4 oz of cold water daily

## 2020-01-10 NOTE — PATIENT INSTRUCTIONS
Overall seems to be doing much better sleep is improved still having trouble with the constipation  Besides drinking more water may try taking Metamucil 1 tbsp orange flavored in 4 oz of cold water daily    Will continue other meds as is and re-evaluate in 1 mo

## 2020-01-10 NOTE — PROGRESS NOTES
Assessment/Plan:    Drug-induced constipation  I feel this relates to the iron supplement  Should use Metamucil 1 tbsp of orange flavored in 4 oz of cold water daily    Pharyngoesophageal dysphagia  Slightly better today but should continue follow-up with GI    Current moderate episode of major depressive disorder without prior episode ([facility] [address] )  Overall seems much better today will continue current regimen and re-evaluate in 1 month       Diagnoses and all orders for this visit:    Drug-induced constipation    Needs flu shot  -     influenza vaccine, [id number], quadrivalent, recombinant, PF, 0 5 mL, for patients 18 yr+ (FLUBLOK)    Pharyngoesophageal dysphagia    Current moderate episode of major depressive disorder without prior episode ([facility] [address] )          Subjective:      Patient ID: Trevor Mota is a 71 y o  female  Patient has history of reflux esophagitis, dysphagia, allergic rhinitis, anemia and stress disorder has been having a lot of trouble with depression over the last several weeks has been continuing on her basic medications also is having more trouble swallowing  Has been having trouble sleeping and was placed on Abilify 2 mg daily is following up with GI for the swallowing dysfunction but this is actually doing better sleep as improved  Still having some trouble with constipation and has been trying to drink more water      The following portions of the patient's history were reviewed and updated as appropriate: allergies, current medications, past medical history, past social history and problem list Depression Screening Follow-up Plan: Patient's depression screening was positive with a PHQ-2 score of   Their PHQ-9 score was   Patient assessed for underlying major depression  They have no active suicidal ideations  Brief counseling provided and recommend additional follow-up/re-evaluation next office visit  Overall seems to be doing much better      Review of Systems   Constitutional: Positive for appetite change  HENT: Positive for trouble swallowing (Although this is mild)  Eyes: Negative for visual disturbance  Respiratory: Negative for shortness of breath  Cardiovascular: Negative for chest pain and palpitations  Gastrointestinal: Positive for constipation  Neurological: Negative for dizziness  Psychiatric/Behavioral: Positive for dysphoric mood  Negative for agitation and sleep disturbance  Objective:      /86 (BP Location: Right arm, Patient Position: Sitting, Cuff Size: Standard)   Wt 58 5 kg (129 lb)   BMI 22 85 kg/m²          Physical Exam   Constitutional: She appears well-developed and well-nourished  HENT:   Head: Normocephalic  Nose: Nose normal    Mouth/Throat: Oropharynx is clear and moist    Eyes: Conjunctivae are normal    Neck: Neck supple  No thyromegaly present  Cardiovascular: Normal rate, regular rhythm and normal heart sounds  No murmur (Rate is 72 no bruits are noted) heard  Pulmonary/Chest: Effort normal and breath sounds normal    Abdominal: Soft  She exhibits no distension and no mass  There is no tenderness  Musculoskeletal: She exhibits no edema  Lymphadenopathy:     She has no cervical adenopathy  Neurological: She is alert  She displays normal reflexes  Coordination normal    Skin: Skin is warm and dry  Psychiatric: She has a normal mood and affect  Her speech is normal and behavior is normal  Judgment and thought content normal  Cognition and memory are normal  She does not exhibit a depressed mood  Nursing note and vitals reviewed

## 2020-01-25 DIAGNOSIS — Z12.31 ENCOUNTER FOR SCREENING MAMMOGRAM FOR BREAST CANCER: ICD-10-CM

## 2020-01-27 NOTE — RESULT ENCOUNTER NOTE
Please call patient and inform of normal mammogram results
Spoke with patient  Understanding verbalized 
[Follow-Up: _____] : a [unfilled] follow-up visit

## 2020-07-27 DIAGNOSIS — F32.1 CURRENT MODERATE EPISODE OF MAJOR DEPRESSIVE DISORDER WITHOUT PRIOR EPISODE (HCC): ICD-10-CM

## 2020-07-27 RX ORDER — CITALOPRAM 20 MG/1
10 TABLET ORAL DAILY
Qty: 15 TABLET | Refills: 5 | Status: SHIPPED | OUTPATIENT
Start: 2020-07-27 | End: 2020-07-27 | Stop reason: SDUPTHER

## 2020-07-27 RX ORDER — CITALOPRAM 20 MG/1
TABLET ORAL
Qty: 30 TABLET | Refills: 5 | Status: SHIPPED | OUTPATIENT
Start: 2020-07-27 | End: 2021-02-15 | Stop reason: SDUPTHER

## 2020-11-17 ENCOUNTER — OFFICE VISIT (OUTPATIENT)
Dept: FAMILY MEDICINE CLINIC | Facility: CLINIC | Age: 70
End: 2020-11-17
Payer: COMMERCIAL

## 2020-11-17 VITALS
HEIGHT: 63 IN | WEIGHT: 129 LBS | OXYGEN SATURATION: 94 % | BODY MASS INDEX: 22.86 KG/M2 | HEART RATE: 98 BPM | DIASTOLIC BLOOD PRESSURE: 70 MMHG | SYSTOLIC BLOOD PRESSURE: 122 MMHG

## 2020-11-17 DIAGNOSIS — F32.1 CURRENT MODERATE EPISODE OF MAJOR DEPRESSIVE DISORDER WITHOUT PRIOR EPISODE (HCC): ICD-10-CM

## 2020-11-17 DIAGNOSIS — Z23 NEEDS FLU SHOT: ICD-10-CM

## 2020-11-17 DIAGNOSIS — K21.00 GASTROESOPHAGEAL REFLUX DISEASE WITH ESOPHAGITIS WITHOUT HEMORRHAGE: Primary | Chronic | ICD-10-CM

## 2020-11-17 DIAGNOSIS — R13.14 PHARYNGOESOPHAGEAL DYSPHAGIA: Chronic | ICD-10-CM

## 2020-11-17 DIAGNOSIS — E78.00 HYPERCHOLESTEROLEMIA: ICD-10-CM

## 2020-11-17 DIAGNOSIS — J30.1 SEASONAL ALLERGIC RHINITIS DUE TO POLLEN: Chronic | ICD-10-CM

## 2020-11-17 DIAGNOSIS — D50.8 IRON DEFICIENCY ANEMIA SECONDARY TO INADEQUATE DIETARY IRON INTAKE: Chronic | ICD-10-CM

## 2020-11-17 DIAGNOSIS — Z00.00 MEDICARE ANNUAL WELLNESS VISIT, SUBSEQUENT: ICD-10-CM

## 2020-11-17 PROBLEM — K59.03 DRUG-INDUCED CONSTIPATION: Chronic | Status: RESOLVED | Noted: 2020-01-10 | Resolved: 2020-11-17

## 2020-11-17 PROCEDURE — 1036F TOBACCO NON-USER: CPT | Performed by: FAMILY MEDICINE

## 2020-11-17 PROCEDURE — 90471 IMMUNIZATION ADMIN: CPT | Performed by: FAMILY MEDICINE

## 2020-11-17 PROCEDURE — 90662 IIV NO PRSV INCREASED AG IM: CPT | Performed by: FAMILY MEDICINE

## 2020-11-17 PROCEDURE — 1170F FXNL STATUS ASSESSED: CPT | Performed by: FAMILY MEDICINE

## 2020-11-17 PROCEDURE — 1125F AMNT PAIN NOTED PAIN PRSNT: CPT | Performed by: FAMILY MEDICINE

## 2020-11-17 PROCEDURE — 1160F RVW MEDS BY RX/DR IN RCRD: CPT | Performed by: FAMILY MEDICINE

## 2020-11-17 PROCEDURE — 99214 OFFICE O/P EST MOD 30 MIN: CPT | Performed by: FAMILY MEDICINE

## 2020-11-17 PROCEDURE — 3008F BODY MASS INDEX DOCD: CPT | Performed by: FAMILY MEDICINE

## 2020-11-17 PROCEDURE — 3725F SCREEN DEPRESSION PERFORMED: CPT | Performed by: FAMILY MEDICINE

## 2020-11-17 PROCEDURE — G0439 PPPS, SUBSEQ VISIT: HCPCS | Performed by: FAMILY MEDICINE

## 2021-02-15 DIAGNOSIS — F32.1 CURRENT MODERATE EPISODE OF MAJOR DEPRESSIVE DISORDER WITHOUT PRIOR EPISODE (HCC): ICD-10-CM

## 2021-02-15 RX ORDER — CITALOPRAM 20 MG/1
TABLET ORAL
Qty: 30 TABLET | Refills: 5 | Status: SHIPPED | OUTPATIENT
Start: 2021-02-15 | End: 2021-09-03 | Stop reason: SDUPTHER

## 2021-03-16 ENCOUNTER — IMMUNIZATIONS (OUTPATIENT)
Dept: FAMILY MEDICINE CLINIC | Facility: HOSPITAL | Age: 71
End: 2021-03-16

## 2021-03-16 DIAGNOSIS — Z23 ENCOUNTER FOR IMMUNIZATION: Primary | ICD-10-CM

## 2021-03-16 PROCEDURE — 91300 SARS-COV-2 / COVID-19 MRNA VACCINE (PFIZER-BIONTECH) 30 MCG: CPT

## 2021-03-16 PROCEDURE — 0001A SARS-COV-2 / COVID-19 MRNA VACCINE (PFIZER-BIONTECH) 30 MCG: CPT

## 2021-04-08 ENCOUNTER — IMMUNIZATIONS (OUTPATIENT)
Dept: FAMILY MEDICINE CLINIC | Facility: HOSPITAL | Age: 71
End: 2021-04-08

## 2021-04-08 DIAGNOSIS — Z23 ENCOUNTER FOR IMMUNIZATION: Primary | ICD-10-CM

## 2021-04-08 PROCEDURE — 0002A SARS-COV-2 / COVID-19 MRNA VACCINE (PFIZER-BIONTECH) 30 MCG: CPT

## 2021-04-08 PROCEDURE — 91300 SARS-COV-2 / COVID-19 MRNA VACCINE (PFIZER-BIONTECH) 30 MCG: CPT

## 2021-05-24 ENCOUNTER — OFFICE VISIT (OUTPATIENT)
Dept: FAMILY MEDICINE CLINIC | Facility: CLINIC | Age: 71
End: 2021-05-24
Payer: COMMERCIAL

## 2021-05-24 VITALS
HEART RATE: 91 BPM | DIASTOLIC BLOOD PRESSURE: 62 MMHG | HEIGHT: 63 IN | BODY MASS INDEX: 23.74 KG/M2 | SYSTOLIC BLOOD PRESSURE: 122 MMHG | WEIGHT: 134 LBS | OXYGEN SATURATION: 96 %

## 2021-05-24 DIAGNOSIS — D50.8 IRON DEFICIENCY ANEMIA SECONDARY TO INADEQUATE DIETARY IRON INTAKE: Chronic | ICD-10-CM

## 2021-05-24 DIAGNOSIS — F32.1 CURRENT MODERATE EPISODE OF MAJOR DEPRESSIVE DISORDER WITHOUT PRIOR EPISODE (HCC): ICD-10-CM

## 2021-05-24 DIAGNOSIS — Z00.00 WELLNESS EXAMINATION: Primary | ICD-10-CM

## 2021-05-24 DIAGNOSIS — K21.00 GASTROESOPHAGEAL REFLUX DISEASE WITH ESOPHAGITIS WITHOUT HEMORRHAGE: Chronic | ICD-10-CM

## 2021-05-24 DIAGNOSIS — Z12.31 ENCOUNTER FOR SCREENING MAMMOGRAM FOR BREAST CANCER: ICD-10-CM

## 2021-05-24 DIAGNOSIS — J30.1 SEASONAL ALLERGIC RHINITIS DUE TO POLLEN: Chronic | ICD-10-CM

## 2021-05-24 DIAGNOSIS — Z23 ENCOUNTER FOR IMMUNIZATION: ICD-10-CM

## 2021-05-24 PROBLEM — Z11.59 NEED FOR HEPATITIS C SCREENING TEST: Status: RESOLVED | Noted: 2021-05-24 | Resolved: 2021-05-24

## 2021-05-24 PROBLEM — Z11.59 NEED FOR HEPATITIS C SCREENING TEST: Status: ACTIVE | Noted: 2021-05-24

## 2021-05-24 PROCEDURE — 1036F TOBACCO NON-USER: CPT | Performed by: FAMILY MEDICINE

## 2021-05-24 PROCEDURE — 90732 PPSV23 VACC 2 YRS+ SUBQ/IM: CPT

## 2021-05-24 PROCEDURE — 4040F PNEUMOC VAC/ADMIN/RCVD: CPT | Performed by: FAMILY MEDICINE

## 2021-05-24 PROCEDURE — 90471 IMMUNIZATION ADMIN: CPT

## 2021-05-24 PROCEDURE — 1160F RVW MEDS BY RX/DR IN RCRD: CPT | Performed by: FAMILY MEDICINE

## 2021-05-24 PROCEDURE — 99397 PER PM REEVAL EST PAT 65+ YR: CPT | Performed by: FAMILY MEDICINE

## 2021-05-24 PROCEDURE — 3008F BODY MASS INDEX DOCD: CPT | Performed by: FAMILY MEDICINE

## 2021-05-24 RX ORDER — OMEPRAZOLE 20 MG/1
20 CAPSULE, DELAYED RELEASE ORAL DAILY
Qty: 30 CAPSULE | Refills: 5 | Status: SHIPPED | OUTPATIENT
Start: 2021-05-24

## 2021-05-24 NOTE — PROGRESS NOTES
ADULT ANNUAL [facility] PRIMARY CARE    NAME: Estefany De Luna  AGE: 79 y o  SEX: female  : 1950     DATE: 2021     Assessment and Plan:     Problem List Items Addressed This Visit     None      Visit Diagnoses     Encounter for screening mammogram for breast cancer    -  Primary    Need for hepatitis C screening test        Encounter for immunization              Immunizations and preventive care screenings were discussed with patient today  Appropriate education was printed on patient's after visit summary  Counseling:  · Recommended Pneumovax,  and mammogram         No follow-ups on file  Chief Complaint:     Chief Complaint   Patient presents with    Annual Exam    Allergies      History of Present Illness:     Adult Annual Physical   Patient here for a comprehensive physical exam  The patient reports problems - Nasal congestion and some stress interfering with sleep  Diet and Physical Activity  · Diet/Nutrition: well balanced diet  · Exercise: walking  Depression Screening  PHQ-9 Depression Screening    PHQ-9:   Frequency of the following problems over the past two weeks:           General Health  · Sleep: sleeps poorly  · Hearing: decreased - bilateral   · Vision: goes for regular eye exams  · Dental: regular dental visits  /GYN Health  · Patient is: postmenopausal  · Last menstrual period:  20 years  · Contraceptive method: Postmenopausal      Review of Systems:     Review of Systems   Constitutional: Negative for activity change, appetite change, chills, fatigue, fever and unexpected weight change  HENT: Positive for congestion ( mucus clear to white)  Negative for dental problem, hearing loss, rhinorrhea, trouble swallowing and voice change  Eyes: Positive for itching  Negative for visual disturbance  Respiratory: Positive for choking   Negative for apnea, cough, chest tightness and shortness of breath  Cardiovascular: Negative for chest pain, palpitations and leg swelling  Gastrointestinal: Negative for abdominal distention, abdominal pain, constipation and diarrhea  Endocrine: Positive for polyuria ( nocturia times 1-2)  Genitourinary: Negative for enuresis  Musculoskeletal: Negative for arthralgias and myalgias  Skin: Negative for rash  Allergic/Immunologic: Positive for environmental allergies  Neurological: Negative for dizziness, weakness, light-headedness, numbness and headaches  Hematological: Negative for adenopathy  Does not bruise/bleed easily  Psychiatric/Behavioral: Positive for dysphoric mood ( related to recent death of brother) and sleep disturbance  Negative for agitation        Past Medical History:     Past Medical History:   Diagnosis Date    Depression     Esophageal stricture     History of gastroesophageal reflux (GERD)     Iron deficiency anemia       Past Surgical History:     Past Surgical History:   Procedure Laterality Date    COLONOSCOPY  2012    GALLBLADDER SURGERY      HYSTERECTOMY      OTHER SURGICAL HISTORY      Endoscopy of stricture 2010, 2012, 5/2014 - Esophageal ulcer; 7/2014 - Esophageal ulcer; 10/2015 (Dr Harpreet Vera) - Esophageal stricture; 10/2016; 11/2017 - Stricture      Social History:        Social History     Socioeconomic History    Marital status: /Civil Union     Spouse name: None    Number of children: None    Years of education: None    Highest education level: None   Occupational History    None   Social Needs    Financial resource strain: None    Food insecurity     Worry: None     Inability: None    Transportation needs     Medical: None     Non-medical: None   Tobacco Use    Smoking status: Never Smoker    Smokeless tobacco: Never Used   Substance and Sexual Activity    Alcohol use: Not Currently     Comment: Denies alcohol use - As per [employer] Drug use: Not Currently    Sexual activity: None   Lifestyle  Physical activity     Days per week: None     Minutes per session: None    Stress: None   Relationships    Social connections     Talks on phone: None     Gets together: None     Attends Sikhism service: None     Active member of club or organization: None     Attends meetings of clubs or organizations: None     Relationship status: None    Intimate partner violence     Fear of current or ex partner: None     Emotionally abused: None     Physically abused: None     Forced sexual activity: None   Other Topics Concern    None   Social History Narrative    None      Family History:     Family History   Problem Relation Age of Onset    Stroke Mother     Hypertension Mother       Current Medications:     Current Outpatient Medications   Medication Sig Dispense Refill    citalopram (CeleXA) 20 mg tablet Take 1 tablet daily 30 tablet 5    ferrous sulfate 325 (65 Fe) mg tablet Take 325 mg by mouth daily with breakfast      omeprazole (PriLOSEC) 20 mg delayed release capsule Take 20 mg by mouth daily      mometasone (ELOCON) 0 1 % cream Apply topically daily for 5 days 30 g 0     No current facility-administered medications for this visit  Allergies:     No Known Allergies   Physical Exam:     /62 (BP Location: Left arm, Patient Position: Sitting, Cuff Size: Standard)   Pulse 91   Ht 5' 3" (1 6 m)   Wt 60 8 kg (134 lb)   SpO2 96%   BMI 23 74 kg/m²     Physical Exam  Vitals signs and nursing note reviewed  Constitutional:       Appearance: Normal appearance  She is not ill-appearing  HENT:      Head: Normocephalic  Right Ear: Tympanic membrane, ear canal and external ear normal  Decreased hearing (Twenty-five decibel hearing screen is off  No difficulty with conversation) noted  There is impacted cerumen  Left Ear: Tympanic membrane, ear canal and external ear normal  Decreased hearing ( 25 decibelHearing screen is off) noted  There is impacted cerumen        Nose: Nose normal  Mouth/Throat:      Mouth: Mucous membranes are moist       Dentition: Abnormal dentition ( multiple missing lower teeth)  Has dentures ( partial upper)  Eyes:      Extraocular Movements: Extraocular movements intact  Conjunctiva/sclera: Conjunctivae normal       Pupils: Pupils are equal, round, and reactive to light  Neck:      Musculoskeletal: Normal range of motion and neck supple  Vascular: No carotid bruit  Cardiovascular:      Rate and Rhythm: Normal rate and regular rhythm  Pulses:           Dorsalis pedis pulses are 2+ on the right side and 1+ on the left side  Posterior tibial pulses are 1+ on the right side and 1+ on the left side  Heart sounds: Murmur ( grade 2/6 systolic murmur at left sternal border  Heart rate is 78) present  Pulmonary:      Effort: Pulmonary effort is normal       Breath sounds: Normal breath sounds  Abdominal:      General: Abdomen is flat  There is no distension  Palpations: Abdomen is soft  There is no mass  Tenderness: There is no abdominal tenderness  Musculoskeletal:         General: No swelling  Right lower leg: No edema  Left lower leg: No edema  Right foot: No deformity  Left foot: No deformity  Feet:      Right foot:      Protective Sensation: 8 sites tested  8 sites sensed  Skin integrity: Skin integrity normal       Left foot:      Protective Sensation: 8 sites tested  8 sites sensed  Skin integrity: Skin integrity normal    Lymphadenopathy:      Cervical: No cervical adenopathy  Skin:     General: Skin is warm and dry  Capillary Refill: Capillary refill takes 2 to 3 seconds  Findings: No rash  Neurological:      General: No focal deficit present  Mental Status: She is alert and oriented to person, place, and time  Cranial Nerves: No cranial nerve deficit  Sensory: No sensory deficit  Motor: No weakness        Coordination: Coordination normal       Gait: Gait normal       Deep Tendon Reflexes: Reflexes abnormal ( reflexes 1+)  Psychiatric:         Mood and Affect: Mood normal          Behavior: Behavior normal          Thought Content:  Thought content normal          Judgment: Judgment normal           Junior Buckner MD  [address] [facility] PRIMARY CARE

## 2021-05-24 NOTE — ASSESSMENT & PLAN NOTE
Continue iron supplement    Patient is going to donate blood which will give her a hepatitis c screen and also screen her for anemia

## 2021-05-24 NOTE — ASSESSMENT & PLAN NOTE
Overall doing well considering the current stress elements    Does have some trouble with sleep and advised to only think about pleasant thoughts when going to bed which should help with the sleep

## 2021-05-24 NOTE — PATIENT INSTRUCTIONS
Overall exam today looks good  Is having some trouble with allergic rhinitis and should try taking Zyrtec and using the saline nasal rinse at bedtime and in the morning  Should using earwax softener 3 or 4 drops in each ear at bedtime for 1 week to help with the buildup of wax  Does have some trouble with stress which is interfering with sleep and should try to be very regimented when try laying down to go to sleep do think just about pleasant thoughts    Will give Pneumovax today and patient is going to donate blood which will give her a screen for hepatitis C

## 2021-05-24 NOTE — ASSESSMENT & PLAN NOTE
Advised to use Zyrtec 10 mg daily and use saline nasal rinse at nighttime and in the morning may also use p r n

## 2021-05-25 ENCOUNTER — TELEPHONE (OUTPATIENT)
Dept: ADMINISTRATIVE | Facility: OTHER | Age: 71
End: 2021-05-25

## 2021-05-25 NOTE — TELEPHONE ENCOUNTER
----- Message from Mari Matthews sent at 5/24/2021 10:37 AM EDT -----  Regarding: Care Gap Update  05/24/21 10:37 AM    Hello, our patient attached above has had Hepatitis C completed/performed  Please assist in updating the patient chart by pulling the Care Everywhere (CE) document  The date of service is 5       Thank you,  Mari Matthews   [facility]

## 2021-05-25 NOTE — TELEPHONE ENCOUNTER
Upon review of the In Basket request we were able to locate, review, and update the patient chart as requested for Hepatitis C   Any additional questions or concerns should be emailed to the Practice Liaisons via [email or web address] email, please do not reply via In Basket      Thank you  Jennifer Negrete

## 2021-07-26 ENCOUNTER — TELEPHONE (OUTPATIENT)
Dept: ADMINISTRATIVE | Facility: OTHER | Age: 71
End: 2021-07-26

## 2021-07-26 NOTE — TELEPHONE ENCOUNTER
Upon review of the In Basket request we were able to locate, review, and update the patient chart as requested for Mammogram     Any additional questions or concerns should be emailed to the Practice Liaisons via [email or web address] email, please do not reply via In Basket      Thank you  Kaylan Will MA

## 2021-07-26 NOTE — TELEPHONE ENCOUNTER
----- Message from Kassandra Herrera sent at 7/25/2021 11:09 AM EDT -----  Regarding: Care Gap Request  07/25/21 11:09 AM    Hello, our patient attached above has had Mammogram completed/performed  Please assist in updating the patient chart by pulling the Care Everywhere (CE) document  The date of service is 2021       Thank you,  Vasquez Palomino MA  [facility] PRIMARY [facility]

## 2021-09-03 DIAGNOSIS — F32.1 CURRENT MODERATE EPISODE OF MAJOR DEPRESSIVE DISORDER WITHOUT PRIOR EPISODE (HCC): ICD-10-CM

## 2021-09-03 RX ORDER — CITALOPRAM 20 MG/1
TABLET ORAL
Qty: 30 TABLET | Refills: 3 | Status: SHIPPED | OUTPATIENT
Start: 2021-09-03 | End: 2021-11-30 | Stop reason: SDUPTHER

## 2021-11-05 ENCOUNTER — IMMUNIZATIONS (OUTPATIENT)
Dept: FAMILY MEDICINE CLINIC | Facility: CLINIC | Age: 71
End: 2021-11-05
Payer: COMMERCIAL

## 2021-11-05 DIAGNOSIS — Z23 ENCOUNTER FOR IMMUNIZATION: Primary | ICD-10-CM

## 2021-11-05 PROCEDURE — 90471 IMMUNIZATION ADMIN: CPT

## 2021-11-05 PROCEDURE — 90662 IIV NO PRSV INCREASED AG IM: CPT

## 2021-11-30 ENCOUNTER — OFFICE VISIT (OUTPATIENT)
Dept: FAMILY MEDICINE CLINIC | Facility: CLINIC | Age: 71
End: 2021-11-30
Payer: COMMERCIAL

## 2021-11-30 VITALS
HEART RATE: 96 BPM | HEIGHT: 63 IN | OXYGEN SATURATION: 99 % | DIASTOLIC BLOOD PRESSURE: 80 MMHG | BODY MASS INDEX: 23.57 KG/M2 | SYSTOLIC BLOOD PRESSURE: 150 MMHG | WEIGHT: 133 LBS

## 2021-11-30 DIAGNOSIS — F32.1 CURRENT MODERATE EPISODE OF MAJOR DEPRESSIVE DISORDER WITHOUT PRIOR EPISODE (HCC): ICD-10-CM

## 2021-11-30 DIAGNOSIS — J30.1 SEASONAL ALLERGIC RHINITIS DUE TO POLLEN: Chronic | ICD-10-CM

## 2021-11-30 DIAGNOSIS — R13.14 PHARYNGOESOPHAGEAL DYSPHAGIA: Primary | Chronic | ICD-10-CM

## 2021-11-30 DIAGNOSIS — D50.8 IRON DEFICIENCY ANEMIA SECONDARY TO INADEQUATE DIETARY IRON INTAKE: Chronic | ICD-10-CM

## 2021-11-30 PROCEDURE — 1101F PT FALLS ASSESS-DOCD LE1/YR: CPT | Performed by: FAMILY MEDICINE

## 2021-11-30 PROCEDURE — 99214 OFFICE O/P EST MOD 30 MIN: CPT | Performed by: FAMILY MEDICINE

## 2021-11-30 RX ORDER — CITALOPRAM 20 MG/1
TABLET ORAL
Qty: 30 TABLET | Refills: 5 | Status: SHIPPED | OUTPATIENT
Start: 2021-11-30

## 2022-11-25 ENCOUNTER — APPOINTMENT (OUTPATIENT)
Dept: RADIOLOGY | Facility: CLINIC | Age: 72
End: 2022-11-25

## 2022-11-25 ENCOUNTER — OFFICE VISIT (OUTPATIENT)
Dept: URGENT CARE | Facility: CLINIC | Age: 72
End: 2022-11-25

## 2022-11-25 VITALS
RESPIRATION RATE: 18 BRPM | DIASTOLIC BLOOD PRESSURE: 72 MMHG | HEART RATE: 90 BPM | WEIGHT: 133 LBS | BODY MASS INDEX: 23.57 KG/M2 | OXYGEN SATURATION: 93 % | TEMPERATURE: 97.7 F | SYSTOLIC BLOOD PRESSURE: 156 MMHG | HEIGHT: 63 IN

## 2022-11-25 DIAGNOSIS — J40 BRONCHITIS: ICD-10-CM

## 2022-11-25 DIAGNOSIS — R05.1 ACUTE COUGH: ICD-10-CM

## 2022-11-25 DIAGNOSIS — R05.1 ACUTE COUGH: Primary | ICD-10-CM

## 2022-11-25 LAB
SARS-COV-2 AG UPPER RESP QL IA: NEGATIVE
VALID CONTROL: NORMAL

## 2022-11-25 RX ORDER — PREDNISONE 50 MG/1
50 TABLET ORAL DAILY
Qty: 5 TABLET | Refills: 0 | Status: SHIPPED | OUTPATIENT
Start: 2022-11-25 | End: 2022-11-30

## 2022-11-25 RX ORDER — DOXYCYCLINE 100 MG/1
100 TABLET ORAL 2 TIMES DAILY
Qty: 20 TABLET | Refills: 0 | Status: SHIPPED | OUTPATIENT
Start: 2022-11-25 | End: 2022-12-05

## 2022-11-25 RX ORDER — ALBUTEROL SULFATE 90 UG/1
2 AEROSOL, METERED RESPIRATORY (INHALATION) EVERY 6 HOURS PRN
Qty: 6.7 G | Refills: 0 | Status: SHIPPED | OUTPATIENT
Start: 2022-11-25

## 2022-11-25 NOTE — PROGRESS NOTES
[facility]        NAME: Rodri Maurice is a 67 y o  female  : 1950    MRN: [id number]  DATE: 2022  TIME: 1:40 PM    Assessment and Plan   Acute cough [R05 1]  1  Acute cough  Poct Covid 19 Rapid Antigen Test    XR chest pa & lateral    albuterol (Proventil HFA) 90 mcg/act inhaler    doxycycline (ADOXA) 100 MG tablet    predniSONE 50 mg tablet      2  Bronchitis  albuterol (Proventil HFA) 90 mcg/act inhaler    doxycycline (ADOXA) 100 MG tablet            Patient Instructions       Follow up with PCP in 3-5 days  Proceed to  ER if symptoms worsen  Chief Complaint     Chief Complaint   Patient presents with   • Cold Like Symptoms     Productive cough with mucus production, fatigued, and chest congestion starting 5 days ago         History of Present Illness       Has had cough and congestion for past 5 days  Intermittent chills but no fever  Decreased appetitie and denies n/v/d  + congestion  Quit smoking one year ago  Is drinkinging normally  Tried mucinex and advil with some relief   Used husbands resuce inhaler with some relief  Denies any lung disease dx  Review of Systems   Review of Systems   Constitutional: Positive for appetite change and fatigue  Negative for diaphoresis and fever  HENT: Positive for sneezing  Negative for congestion, drooling, facial swelling, nosebleeds, postnasal drip, rhinorrhea, sinus pressure, sore throat, tinnitus and trouble swallowing  Eyes: Negative  Negative for discharge and redness  Respiratory: Positive for cough, chest tightness and wheezing  Negative for shortness of breath  Cardiovascular: Negative  Negative for chest pain and leg swelling  Gastrointestinal: Negative  Negative for abdominal pain and constipation  Endocrine: Negative  Genitourinary: Negative  Negative for dysuria  Musculoskeletal: Negative  Negative for back pain, gait problem and neck pain  Skin: Negative  Negative for color change  Neurological: Negative  Negative for dizziness, speech difficulty and weakness  Psychiatric/Behavioral: Negative  Negative for behavioral problems           Current Medications       Current Outpatient Medications:   •  albuterol (Proventil HFA) 90 mcg/act inhaler, Inhale 2 puffs every 6 (six) hours as needed for wheezing, Disp: 6 7 g, Rfl: 0  •  citalopram (CeleXA) 20 mg tablet, Take 1 tablet daily, Disp: 30 tablet, Rfl: 5  •  doxycycline (ADOXA) 100 MG tablet, Take 1 tablet (100 mg total) by mouth 2 (two) times a day for 10 days, Disp: 20 tablet, Rfl: 0  •  omeprazole (PriLOSEC) 20 mg delayed release capsule, Take 1 capsule (20 mg total) by mouth daily, Disp: 30 capsule, Rfl: 5  •  predniSONE 50 mg tablet, Take 1 tablet (50 mg total) by mouth daily for 5 days, Disp: 5 tablet, Rfl: 0  •  ferrous sulfate 325 (65 Fe) mg tablet, Take 325 mg by mouth daily with breakfast (Patient not taking: Reported on 11/25/2022), Disp: , Rfl:   •  mometasone (ELOCON) 0 1 % cream, Apply topically daily for 5 days, Disp: 30 g, Rfl: 0    Current Allergies     Allergies as of 11/25/2022   • (No Known Allergies)            The following portions of the patient's history were reviewed and updated as appropriate: allergies, current medications, past family history, past medical history, past social history, past surgical history and problem list      Past Medical History:   Diagnosis Date   • Depression    • Esophageal stricture    • History of gastroesophageal reflux (GERD)    • Iron deficiency anemia        Past Surgical History:   Procedure Laterality Date   • COLONOSCOPY  2012   • GALLBLADDER SURGERY     • HYSTERECTOMY     • OTHER SURGICAL HISTORY      Endoscopy of stricture 2010, 2012, 5/2014 - Esophageal ulcer; 7/2014 - Esophageal ulcer; 10/2015 (Dr Arabella Malcolm) - Esophageal stricture; 10/2016; 11/2017 - Stricture       Family History   Problem Relation Age of Onset   • Stroke Mother    • Hypertension Mother    • Emphysema Father Medications have been verified  Objective   /72   Pulse 90   Temp 97 7 °F (36 5 °C)   Resp 18   Ht 5' 3" (1 6 m)   Wt 60 3 kg (133 lb)   SpO2 93%   BMI 23 56 kg/m²   No LMP recorded  Patient is postmenopausal        Physical Exam     Physical Exam  Constitutional:       General: She is not in acute distress  Appearance: Normal appearance  She is normal weight  She is not ill-appearing  HENT:      Head: Normocephalic  Right Ear: Tympanic membrane, ear canal and external ear normal       Left Ear: Ear canal and external ear normal       Nose: Nose normal       Mouth/Throat:      Mouth: Mucous membranes are moist    Eyes:      Extraocular Movements: Extraocular movements intact  Conjunctiva/sclera: Conjunctivae normal       Pupils: Pupils are equal, round, and reactive to light  Cardiovascular:      Rate and Rhythm: Normal rate  Pulses: Normal pulses  Pulmonary:      Effort: Pulmonary effort is normal  No retractions  Breath sounds: Decreased air movement present  Examination of the right-upper field reveals wheezing and rhonchi  Examination of the left-upper field reveals wheezing and rhonchi  Examination of the left-middle field reveals rhonchi  Examination of the right-lower field reveals decreased breath sounds  Examination of the left-lower field reveals decreased breath sounds  Decreased breath sounds, wheezing and rhonchi present  Abdominal:      General: Abdomen is flat  Musculoskeletal:         General: Normal range of motion  Cervical back: Normal range of motion  Skin:     General: Skin is warm  Neurological:      Mental Status: She is alert and oriented to person, place, and time  Psychiatric:         Mood and Affect: Mood normal          Behavior: Behavior normal          Thought Content:  Thought content normal          Judgment: Judgment normal

## 2023-01-24 PROBLEM — R05.1 ACUTE COUGH: Status: RESOLVED | Noted: 2022-11-25 | Resolved: 2023-01-24

## 2024-01-31 ENCOUNTER — OFFICE VISIT (OUTPATIENT)
Dept: URGENT CARE | Facility: CLINIC | Age: 74
End: 2024-01-31
Payer: COMMERCIAL

## 2024-01-31 VITALS
HEART RATE: 93 BPM | BODY MASS INDEX: 23.21 KG/M2 | RESPIRATION RATE: 20 BRPM | WEIGHT: 131 LBS | DIASTOLIC BLOOD PRESSURE: 70 MMHG | HEIGHT: 63 IN | TEMPERATURE: 97.5 F | SYSTOLIC BLOOD PRESSURE: 145 MMHG | OXYGEN SATURATION: 96 %

## 2024-01-31 DIAGNOSIS — R05.9 COUGH, UNSPECIFIED TYPE: Primary | ICD-10-CM

## 2024-01-31 LAB
SARS-COV-2 AG UPPER RESP QL IA: NEGATIVE
VALID CONTROL: NORMAL

## 2024-01-31 PROCEDURE — 99213 OFFICE O/P EST LOW 20 MIN: CPT | Performed by: PHYSICIAN ASSISTANT

## 2024-01-31 PROCEDURE — 87811 SARS-COV-2 COVID19 W/OPTIC: CPT | Performed by: PHYSICIAN ASSISTANT

## 2024-01-31 RX ORDER — PREDNISONE 10 MG/1
TABLET ORAL
Qty: 18 TABLET | Refills: 0 | Status: SHIPPED | OUTPATIENT
Start: 2024-01-31

## 2024-01-31 RX ORDER — ACETAMINOPHEN 325 MG/1
650 TABLET ORAL EVERY 6 HOURS PRN
COMMUNITY

## 2024-01-31 RX ORDER — BENZONATATE 200 MG/1
200 CAPSULE ORAL 3 TIMES DAILY PRN
Qty: 20 CAPSULE | Refills: 0 | Status: SHIPPED | OUTPATIENT
Start: 2024-01-31

## 2024-01-31 NOTE — PROGRESS NOTES
[facility]        NAME: Bea Perez is a 73 y.o. female  : 1950    MRN: [id number]  DATE: 2024  TIME: 11:17 AM    Assessment and Plan   Cough, unspecified type [R05.9]  1. Cough, unspecified type  Poct Covid 19 Rapid Antigen Test    predniSONE 10 mg tablet    benzonatate (TESSALON) 200 MG capsule            Patient Instructions       Follow up with PCP as needed.  Increase fluids.  Chief Complaint     Chief Complaint   Patient presents with    Cold Like Symptoms     Chills and body aches starting . Cough, sinus pressure, and chest congestion starting Monday. OTC meds: mucinex.         History of Present Illness       Patient with 4-day history of URI symptoms.   had been sick several days prior.  He is doing well now.  She has tried over-the-counter Mucinex without success.    Cough  This is a new problem. The problem has been unchanged. The problem occurs every few minutes. The cough is Productive of sputum. Associated symptoms include ear congestion, headaches, myalgias, nasal congestion, postnasal drip and rhinorrhea. Pertinent negatives include no chest pain, chills, ear pain, fever, heartburn, hemoptysis, rash, sore throat, shortness of breath, sweats, weight loss or wheezing. The symptoms are aggravated by lying down.       Review of Systems   Review of Systems   Constitutional:  Negative for chills, fever and weight loss.   HENT:  Positive for postnasal drip and rhinorrhea. Negative for ear pain and sore throat.    Respiratory:  Positive for cough. Negative for hemoptysis, shortness of breath and wheezing.    Cardiovascular:  Negative for chest pain.   Gastrointestinal:  Negative for heartburn.   Musculoskeletal:  Positive for myalgias.   Skin:  Negative for rash.   Neurological:  Positive for headaches.   All other systems reviewed and are negative.        Current Medications       Current Outpatient Medications:     acetaminophen (TYLENOL) 325 mg tablet, Take  650 mg by mouth every 6 (six) hours as needed for mild pain, Disp: , Rfl:     benzonatate (TESSALON) 200 MG capsule, Take 1 capsule (200 mg total) by mouth 3 (three) times a day as needed for cough, Disp: 20 capsule, Rfl: 0    citalopram (CeleXA) 20 mg tablet, Take 1 tablet daily, Disp: 30 tablet, Rfl: 5    ferrous sulfate 325 (65 Fe) mg tablet, Take 325 mg by mouth daily with breakfast, Disp: , Rfl:     guaiFENesin (MUCINEX PO), Take by mouth, Disp: , Rfl:     mometasone (ELOCON) 0.1 % cream, Apply topically daily for 5 days, Disp: 30 g, Rfl: 0    omeprazole (PriLOSEC) 20 mg delayed release capsule, Take 1 capsule (20 mg total) by mouth daily, Disp: 30 capsule, Rfl: 5    predniSONE 10 mg tablet, 4 x 3 days, 3 x 1, 2 x 1, 1 x 1, Disp: 18 tablet, Rfl: 0    albuterol (Proventil HFA) 90 mcg/act inhaler, Inhale 2 puffs every 6 (six) hours as needed for wheezing (Patient not taking: Reported on 1/31/2024), Disp: 6.7 g, Rfl: 0    Current Allergies     Allergies as of 01/31/2024    (No Known Allergies)            The following portions of the patient's history were reviewed and updated as appropriate: allergies, current medications, past family history, past medical history, past social history, past surgical history and problem list.     Past Medical History:   Diagnosis Date    Depression     Esophageal stricture     History of gastroesophageal reflux (GERD)     Iron deficiency anemia        Past Surgical History:   Procedure Laterality Date    COLONOSCOPY  2012    GALLBLADDER SURGERY      HYSTERECTOMY      OTHER SURGICAL HISTORY      Endoscopy of stricture 2010, 2012, 5/2014 - Esophageal ulcer; 7/2014 - Esophageal ulcer; 10/2015 (Dr. Jones) - Esophageal stricture; 10/2016; 11/2017 - Stricture       Family History   Problem Relation Age of Onset    Stroke Mother     Hypertension Mother     Emphysema Father          Medications have been verified.        Objective   /70   Pulse 93   Temp 97.5 °F (36.4 °C)   Resp  "20   Ht 5' 3\" (1.6 m)   Wt 59.4 kg (131 lb)   SpO2 96%   BMI 23.21 kg/m²   No LMP recorded. Patient has had a hysterectomy.       Physical Exam     Physical Exam  Vitals and nursing note reviewed.   Constitutional:       Appearance: Normal appearance. She is normal weight.   HENT:      Right Ear: Ear canal and external ear normal.      Left Ear: Ear canal normal.      Nose: Congestion and rhinorrhea present.      Mouth/Throat:      Mouth: Mucous membranes are moist.   Eyes:      Conjunctiva/sclera: Conjunctivae normal.   Cardiovascular:      Rate and Rhythm: Normal rate and regular rhythm.      Pulses: Normal pulses.      Heart sounds: Normal heart sounds.   Pulmonary:      Effort: Pulmonary effort is normal.      Breath sounds: Normal breath sounds.   Neurological:      Mental Status: She is alert.   Psychiatric:         Mood and Affect: Mood normal.         Behavior: Behavior normal.       Nasal mucosa boggy, maxillary transillumination is normal.  TMs slight bulging.            "

## 2024-02-28 ENCOUNTER — OFFICE VISIT (OUTPATIENT)
Dept: URGENT CARE | Facility: CLINIC | Age: 74
End: 2024-02-28
Payer: COMMERCIAL

## 2024-02-28 ENCOUNTER — APPOINTMENT (OUTPATIENT)
Dept: RADIOLOGY | Facility: CLINIC | Age: 74
End: 2024-02-28
Payer: COMMERCIAL

## 2024-02-28 VITALS
TEMPERATURE: 97 F | HEART RATE: 109 BPM | DIASTOLIC BLOOD PRESSURE: 80 MMHG | SYSTOLIC BLOOD PRESSURE: 179 MMHG | OXYGEN SATURATION: 97 % | BODY MASS INDEX: 22.86 KG/M2 | RESPIRATION RATE: 18 BRPM | WEIGHT: 129 LBS | HEIGHT: 63 IN

## 2024-02-28 DIAGNOSIS — J40 BRONCHITIS: Primary | ICD-10-CM

## 2024-02-28 DIAGNOSIS — R05.1 ACUTE COUGH: ICD-10-CM

## 2024-02-28 LAB
SARS-COV-2 AG UPPER RESP QL IA: NEGATIVE
VALID CONTROL: NORMAL

## 2024-02-28 PROCEDURE — 99213 OFFICE O/P EST LOW 20 MIN: CPT

## 2024-02-28 PROCEDURE — 87811 SARS-COV-2 COVID19 W/OPTIC: CPT

## 2024-02-28 PROCEDURE — G0463 HOSPITAL OUTPT CLINIC VISIT: HCPCS

## 2024-02-28 PROCEDURE — 71046 X-RAY EXAM CHEST 2 VIEWS: CPT

## 2024-02-28 RX ORDER — BENZONATATE 100 MG/1
100 CAPSULE ORAL 3 TIMES DAILY PRN
Qty: 20 CAPSULE | Refills: 0 | Status: SHIPPED | OUTPATIENT
Start: 2024-02-28 | End: 2024-03-06

## 2024-02-28 RX ORDER — AZITHROMYCIN 250 MG/1
TABLET, FILM COATED ORAL
Qty: 6 TABLET | Refills: 0 | Status: SHIPPED | OUTPATIENT
Start: 2024-02-28 | End: 2024-03-03

## 2024-02-28 RX ORDER — ALBUTEROL SULFATE 90 UG/1
2 AEROSOL, METERED RESPIRATORY (INHALATION) EVERY 6 HOURS PRN
Qty: 8.5 G | Refills: 0 | Status: SHIPPED | OUTPATIENT
Start: 2024-02-28

## 2024-02-28 RX ORDER — PREDNISONE 20 MG/1
40 TABLET ORAL DAILY
Qty: 10 TABLET | Refills: 0 | Status: SHIPPED | OUTPATIENT
Start: 2024-02-28 | End: 2024-03-04

## 2024-02-28 RX ORDER — IPRATROPIUM BROMIDE AND ALBUTEROL SULFATE 2.5; .5 MG/3ML; MG/3ML
3 SOLUTION RESPIRATORY (INHALATION) ONCE
Status: COMPLETED | OUTPATIENT
Start: 2024-02-28 | End: 2024-02-28

## 2024-02-28 RX ADMIN — IPRATROPIUM BROMIDE AND ALBUTEROL SULFATE 3 ML: 2.5; .5 SOLUTION RESPIRATORY (INHALATION) at 11:29

## 2024-02-28 NOTE — PATIENT INSTRUCTIONS
Rapid POC COVID testing negative  Take antibiotic as prescribed  Take oral steroids as prescribed  Continue with supportive measures, OTC Tylenol/Ibuprofen, nasal decongestants, and cough suppressants (Tessalon Perles)   Cool mist humidifiers, throat lozenges, increased fluid intake and rest   Follow up with PCP in 3-5 days  Present to ER if symptoms worsen   Acute Bronchitis   AMBULATORY CARE:   Acute bronchitis  is swelling and irritation in your lungs. It is usually caused by a virus and most often happens in the winter. Bronchitis may also be caused by bacteria or by a chemical irritant, such as smoke.  Common symptoms:   Cough that lasts up to 3 weeks    Runny or stuffy nose    Hoarseness, sore throat    Fever    Feeling more tired than usual, and body aches    Wheezing or pain when you breathe or cough    Seek care immediately if:   You cough up blood.    Your lips or fingernails turn blue.    You feel like you are not getting enough air when you breathe.    Call your doctor if:   Your symptoms do not go away or get worse, even after treatment.    Your cough does not get better within 4 weeks.    You have questions or concerns about your condition or care.    Medicines:  You may need any of the following:  Cough suppressants  decrease your urge to cough.    Decongestants  help loosen mucus in your lungs and make it easier to cough up. This can help you breathe easier.    Inhalers  may be given. Your healthcare provider may give you one or more inhalers to help you breathe easier and cough less. An inhaler gives you medicine to open your airways. Ask your healthcare provider to show you how to use your inhaler correctly.         Antiviral medicine  treats infections caused by a virus.    Antibiotics  may be given if your bronchitis is caused by bacteria or if you have lung condition.    Acetaminophen  decreases pain and fever. It is available without a doctor's order. Ask how much to take and how often to take  it. Follow directions. Read the labels of all other medicines you are using to see if they also contain acetaminophen, or ask your doctor or pharmacist. Acetaminophen can cause liver damage if not taken correctly.    NSAIDs  help decrease swelling and pain or fever. This medicine is available with or without a doctor's order. NSAIDs can cause stomach bleeding or kidney problems in certain people. If you take blood thinner medicine, always ask your healthcare provider if NSAIDs are safe for you. Always read the medicine label and follow directions.    Self-care:   Drink liquids as directed.  You may need to drink more liquids than usual to stay hydrated. Ask how much liquid to drink each day and which liquids are best for you.    Use a cool mist humidifier.  This increases air moisture in your home. This may make it easier for you to breathe and help decrease your cough.     Get more rest.  Rest helps your body to heal. Slowly start to do more each day. Rest when you feel it is needed.    Prevent acute bronchitis:       Ask about vaccines you may need.  Get a flu vaccine each year as soon as recommended, usually in September or October. Ask your healthcare provider if you should also get a pneumonia or COVID-19 vaccine. Your healthcare provider can tell you if you should also get other vaccines, and when to get them.    Prevent the spread of germs.  You can decrease your risk for acute bronchitis and other illnesses by doing the following:    Wash your hands often with soap and water. Carry germ-killing hand lotion or gel with you. You can use the lotion or gel to clean your hands when soap and water are not available.         Do not touch your eyes, nose, or mouth unless you have washed your hands first.    Always cover your mouth when you cough to prevent the spread of germs. It is best to cough into a tissue or your shirt sleeve instead of into your hand. Ask those around you to cover their mouths when they  cough.    Try to avoid people who have a cold or the flu. If you are sick, stay away from others as much as possible.    Avoid irritants in the air.  Avoid chemicals, fumes, and dust. Wear a face mask if you must work around dust or fumes. Stay inside on days when air pollution levels are high. If you have allergies, stay inside when pollen counts are high. Do not use aerosol products, such as spray-on deodorant, bug spray, and hair spray.    Do not smoke or be around others who are smoking.  Nicotine and other chemicals in cigarettes and cigars can cause lung damage. Ask your healthcare provider for information if you currently smoke and need help to quit. E-cigarettes or smokeless tobacco still contain nicotine. Talk to your healthcare provider before you use these products.  Follow up with your doctor as directed:  Write down questions you have so you will remember to ask them during your follow-up visits.  © Copyright [employer] 2023 Information is for End User's use only and may not be sold, redistributed or otherwise used for commercial purposes.  The above information is an  only. It is not intended as medical advice for individual conditions or treatments. Talk to your doctor, nurse or pharmacist before following any medical regimen to see if it is safe and effective for you.

## 2024-02-28 NOTE — PROGRESS NOTES
[facility]        NAME: Bea Perez is a 73 y.o. female  : 1950    MRN: [id number]  DATE: 2024  TIME: 11:47 AM    Assessment and Plan   Bronchitis [J40]  1. Bronchitis  XR chest pa & lateral    ipratropium-albuterol (DUO-NEB) 0.5-2.5 mg/3 mL inhalation solution 3 mL    Poct Covid 19 Rapid Antigen Test    azithromycin (ZITHROMAX) 250 mg tablet    predniSONE 20 mg tablet    benzonatate (TESSALON PERLES) 100 mg capsule    albuterol (ProAir HFA) 90 mcg/act inhaler        Rapid POC COVID testing negative. Preliminary chest XR concerning for opacity of RLL however appears similar to prior and normal at that time. Duoneb administered with good effect, reduction of wheezing and SPO2 increased to 97%. Will treat with Azithromycin, oral steroids, Tessalon Perles, and refill albuterol inhaler. Encouraged continued supportive measures.  Close follow up with PCP in 3-5 days or proceed to emergency department for worsening symptoms.  Patient verbalized understanding of instructions given.       Patient Instructions     Patient Instructions   Rapid POC COVID testing negative  Take antibiotic as prescribed  Take oral steroids as prescribed  Continue with supportive measures, OTC Tylenol/Ibuprofen, nasal decongestants, and cough suppressants (Tessalon Perles)   Cool mist humidifiers, throat lozenges, increased fluid intake and rest   Follow up with PCP in 3-5 days  Present to ER if symptoms worsen   Acute Bronchitis   AMBULATORY CARE:   Acute bronchitis  is swelling and irritation in your lungs. It is usually caused by a virus and most often happens in the winter. Bronchitis may also be caused by bacteria or by a chemical irritant, such as smoke.  Common symptoms:   Cough that lasts up to 3 weeks    Runny or stuffy nose    Hoarseness, sore throat    Fever    Feeling more tired than usual, and body aches    Wheezing or pain when you breathe or cough    Seek care immediately if:   You cough up  blood.    Your lips or fingernails turn blue.    You feel like you are not getting enough air when you breathe.    Call your doctor if:   Your symptoms do not go away or get worse, even after treatment.    Your cough does not get better within 4 weeks.    You have questions or concerns about your condition or care.    Medicines:  You may need any of the following:  Cough suppressants  decrease your urge to cough.    Decongestants  help loosen mucus in your lungs and make it easier to cough up. This can help you breathe easier.    Inhalers  may be given. Your healthcare provider may give you one or more inhalers to help you breathe easier and cough less. An inhaler gives you medicine to open your airways. Ask your healthcare provider to show you how to use your inhaler correctly.         Antiviral medicine  treats infections caused by a virus.    Antibiotics  may be given if your bronchitis is caused by bacteria or if you have lung condition.    Acetaminophen  decreases pain and fever. It is available without a doctor's order. Ask how much to take and how often to take it. Follow directions. Read the labels of all other medicines you are using to see if they also contain acetaminophen, or ask your doctor or pharmacist. Acetaminophen can cause liver damage if not taken correctly.    NSAIDs  help decrease swelling and pain or fever. This medicine is available with or without a doctor's order. NSAIDs can cause stomach bleeding or kidney problems in certain people. If you take blood thinner medicine, always ask your healthcare provider if NSAIDs are safe for you. Always read the medicine label and follow directions.    Self-care:   Drink liquids as directed.  You may need to drink more liquids than usual to stay hydrated. Ask how much liquid to drink each day and which liquids are best for you.    Use a cool mist humidifier.  This increases air moisture in your home. This may make it easier for you to breathe and help  decrease your cough.     Get more rest.  Rest helps your body to heal. Slowly start to do more each day. Rest when you feel it is needed.    Prevent acute bronchitis:       Ask about vaccines you may need.  Get a flu vaccine each year as soon as recommended, usually in September or October. Ask your healthcare provider if you should also get a pneumonia or COVID-19 vaccine. Your healthcare provider can tell you if you should also get other vaccines, and when to get them.    Prevent the spread of germs.  You can decrease your risk for acute bronchitis and other illnesses by doing the following:    Wash your hands often with soap and water. Carry germ-killing hand lotion or gel with you. You can use the lotion or gel to clean your hands when soap and water are not available.         Do not touch your eyes, nose, or mouth unless you have washed your hands first.    Always cover your mouth when you cough to prevent the spread of germs. It is best to cough into a tissue or your shirt sleeve instead of into your hand. Ask those around you to cover their mouths when they cough.    Try to avoid people who have a cold or the flu. If you are sick, stay away from others as much as possible.    Avoid irritants in the air.  Avoid chemicals, fumes, and dust. Wear a face mask if you must work around dust or fumes. Stay inside on days when air pollution levels are high. If you have allergies, stay inside when pollen counts are high. Do not use aerosol products, such as spray-on deodorant, bug spray, and hair spray.    Do not smoke or be around others who are smoking.  Nicotine and other chemicals in cigarettes and cigars can cause lung damage. Ask your healthcare provider for information if you currently smoke and need help to quit. E-cigarettes or smokeless tobacco still contain nicotine. Talk to your healthcare provider before you use these products.  Follow up with your doctor as directed:  Write down questions you have so you  will remember to ask them during your follow-up visits.  © Copyright [employer] 2023 Information is for End User's use only and may not be sold, redistributed or otherwise used for commercial purposes.  The above information is an  only. It is not intended as medical advice for individual conditions or treatments. Talk to your doctor, nurse or pharmacist before following any medical regimen to see if it is safe and effective for you.        Chief Complaint     Chief Complaint   Patient presents with    Cold Like Symptoms     Cough and fatigue x 1 week         History of Present Illness       73-year-old female with no significant past medical history presents with complaints of nasal congestion, mildly productive cough, wheezing, and fatigue x 1 week.  Denies fever, chills, vomiting, diarrhea, or shortness of breath.  She has been taking OTC medications with minimal relief. Denies known sick contacts or exposures. Former smoker.         Review of Systems   Review of Systems   Constitutional:  Positive for appetite change. Negative for chills and fever.   HENT:  Positive for congestion and rhinorrhea. Negative for ear discharge, ear pain, sore throat, trouble swallowing and voice change.    Eyes:  Negative for discharge.   Respiratory:  Positive for cough and wheezing. Negative for shortness of breath.    Cardiovascular:  Negative for chest pain.   Gastrointestinal:  Negative for abdominal pain, diarrhea, nausea and vomiting.   Musculoskeletal:  Negative for myalgias.   Skin:  Negative for rash.         Current Medications       Current Outpatient Medications:     acetaminophen (TYLENOL) 325 mg tablet, Take 650 mg by mouth every 6 (six) hours as needed for mild pain, Disp: , Rfl:     albuterol (ProAir HFA) 90 mcg/act inhaler, Inhale 2 puffs every 6 (six) hours as needed for wheezing or shortness of breath, Disp: 8.5 g, Rfl: 0    azithromycin (ZITHROMAX) 250 mg tablet, Take 2 tablets today then 1 tablet  daily x 4 days, Disp: 6 tablet, Rfl: 0    benzonatate (TESSALON PERLES) 100 mg capsule, Take 1 capsule (100 mg total) by mouth 3 (three) times a day as needed for cough for up to 7 days, Disp: 20 capsule, Rfl: 0    citalopram (CeleXA) 20 mg tablet, Take 1 tablet daily, Disp: 30 tablet, Rfl: 5    ferrous sulfate 325 (65 Fe) mg tablet, Take 325 mg by mouth daily with breakfast, Disp: , Rfl:     predniSONE 20 mg tablet, Take 2 tablets (40 mg total) by mouth daily for 5 days, Disp: 10 tablet, Rfl: 0    guaiFENesin (MUCINEX PO), Take by mouth (Patient not taking: Reported on 2/28/2024), Disp: , Rfl:     mometasone (ELOCON) 0.1 % cream, Apply topically daily for 5 days (Patient not taking: Reported on 2/28/2024), Disp: 30 g, Rfl: 0    omeprazole (PriLOSEC) 20 mg delayed release capsule, Take 1 capsule (20 mg total) by mouth daily (Patient not taking: Reported on 2/28/2024), Disp: 30 capsule, Rfl: 5  No current facility-administered medications for this visit.    Current Allergies     Allergies as of 02/28/2024    (No Known Allergies)            The following portions of the patient's history were reviewed and updated as appropriate: allergies, current medications, past family history, past medical history, past social history, past surgical history and problem list.     Past Medical History:   Diagnosis Date    Depression     Esophageal stricture     History of gastroesophageal reflux (GERD)     Iron deficiency anemia        Past Surgical History:   Procedure Laterality Date    COLONOSCOPY  2012    GALLBLADDER SURGERY      HYSTERECTOMY      OTHER SURGICAL HISTORY      Endoscopy of stricture 2010, 2012, 5/2014 - Esophageal ulcer; 7/2014 - Esophageal ulcer; 10/2015 (Dr. Jones) - Esophageal stricture; 10/2016; 11/2017 - Stricture       Family History   Problem Relation Age of Onset    Stroke Mother     Hypertension Mother     Emphysema Father          Medications have been verified.        Objective   BP (!) 179/80   Pulse  "(!) 109   Temp (!) 97 °F (36.1 °C) (Temporal)   Resp 18   Ht 5' 3\" (1.6 m)   Wt 58.5 kg (129 lb)   SpO2 97%   BMI 22.85 kg/m²   No LMP recorded. Patient has had a hysterectomy.       Physical Exam     Physical Exam  Vitals and nursing note reviewed.   Constitutional:       General: She is not in acute distress.     Appearance: She is not toxic-appearing.   HENT:      Head: Normocephalic.      Right Ear: Tympanic membrane, ear canal and external ear normal.      Left Ear: Tympanic membrane, ear canal and external ear normal.      Nose: Congestion present.      Mouth/Throat:      Mouth: Mucous membranes are moist.      Pharynx: Oropharynx is clear.   Eyes:      Conjunctiva/sclera: Conjunctivae normal.   Cardiovascular:      Rate and Rhythm: Normal rate and regular rhythm.      Heart sounds: Normal heart sounds.   Pulmonary:      Effort: Pulmonary effort is normal. No respiratory distress.      Breath sounds: No stridor. Examination of the right-upper field reveals wheezing. Examination of the left-upper field reveals wheezing. Examination of the right-lower field reveals wheezing and rhonchi. Examination of the left-lower field reveals wheezing and rhonchi. Wheezing and rhonchi present. No rales.   Lymphadenopathy:      Cervical: No cervical adenopathy.   Skin:     General: Skin is warm and dry.   Neurological:      Mental Status: She is alert and oriented to person, place, and time.      Gait: Gait is intact.   Psychiatric:         Mood and Affect: Mood normal.         Behavior: Behavior normal.                   "

## 2024-07-13 ENCOUNTER — HOSPITAL ENCOUNTER (EMERGENCY)
Facility: HOSPITAL | Age: 74
Discharge: HOME | End: 2024-07-13
Attending: EMERGENCY MEDICINE
Payer: COMMERCIAL

## 2024-07-13 ENCOUNTER — APPOINTMENT (EMERGENCY)
Dept: CT IMAGING | Facility: HOSPITAL | Age: 74
End: 2024-07-13
Payer: COMMERCIAL

## 2024-07-13 VITALS
HEART RATE: 79 BPM | WEIGHT: 128.97 LBS | DIASTOLIC BLOOD PRESSURE: 61 MMHG | BODY MASS INDEX: 22.85 KG/M2 | RESPIRATION RATE: 20 BRPM | OXYGEN SATURATION: 96 % | TEMPERATURE: 98.4 F | SYSTOLIC BLOOD PRESSURE: 132 MMHG

## 2024-07-13 DIAGNOSIS — R42 VERTIGO: ICD-10-CM

## 2024-07-13 DIAGNOSIS — I16.0 HYPERTENSIVE URGENCY: Primary | ICD-10-CM

## 2024-07-13 LAB
ALBUMIN SERPL BCG-MCNC: 4.2 G/DL (ref 3.5–5)
ALP SERPL-CCNC: 84 U/L (ref 34–104)
ALT SERPL W P-5'-P-CCNC: 8 U/L (ref 7–52)
ANION GAP SERPL CALCULATED.3IONS-SCNC: 9 MMOL/L (ref 4–13)
APTT PPP: 25 SECONDS (ref 23–37)
AST SERPL W P-5'-P-CCNC: 15 U/L (ref 13–39)
BASOPHILS # BLD AUTO: 0.06 THOUSANDS/ÂΜL (ref 0–0.1)
BASOPHILS NFR BLD AUTO: 1 % (ref 0–1)
BILIRUB SERPL-MCNC: 0.45 MG/DL (ref 0.2–1)
BUN SERPL-MCNC: 10 MG/DL (ref 5–25)
CALCIUM SERPL-MCNC: 9.8 MG/DL (ref 8.4–10.2)
CARDIAC TROPONIN I PNL SERPL HS: 4 NG/L
CHLORIDE SERPL-SCNC: 104 MMOL/L (ref 96–108)
CO2 SERPL-SCNC: 24 MMOL/L (ref 21–32)
CREAT SERPL-MCNC: 0.73 MG/DL (ref 0.6–1.3)
EOSINOPHIL # BLD AUTO: 0.02 THOUSAND/ÂΜL (ref 0–0.61)
EOSINOPHIL NFR BLD AUTO: 0 % (ref 0–6)
ERYTHROCYTE [DISTWIDTH] IN BLOOD BY AUTOMATED COUNT: 15.6 % (ref 11.6–15.1)
GFR SERPL CREATININE-BSD FRML MDRD: 81 ML/MIN/1.73SQ M
GLUCOSE SERPL-MCNC: 106 MG/DL (ref 65–140)
HCT VFR BLD AUTO: 44.9 % (ref 34.8–46.1)
HGB BLD-MCNC: 14.1 G/DL (ref 11.5–15.4)
IMM GRANULOCYTES # BLD AUTO: 0.03 THOUSAND/UL (ref 0–0.2)
IMM GRANULOCYTES NFR BLD AUTO: 0 % (ref 0–2)
INR PPP: 0.97 (ref 0.84–1.19)
LACTATE SERPL-SCNC: 1.2 MMOL/L (ref 0.5–2)
LIPASE SERPL-CCNC: 44 U/L (ref 11–82)
LYMPHOCYTES # BLD AUTO: 1.11 THOUSANDS/ÂΜL (ref 0.6–4.47)
LYMPHOCYTES NFR BLD AUTO: 13 % (ref 14–44)
MAGNESIUM SERPL-MCNC: 1.9 MG/DL (ref 1.9–2.7)
MCH RBC QN AUTO: 28.4 PG (ref 26.8–34.3)
MCHC RBC AUTO-ENTMCNC: 31.4 G/DL (ref 31.4–37.4)
MCV RBC AUTO: 91 FL (ref 82–98)
MONOCYTES # BLD AUTO: 0.6 THOUSAND/ÂΜL (ref 0.17–1.22)
MONOCYTES NFR BLD AUTO: 7 % (ref 4–12)
NEUTROPHILS # BLD AUTO: 6.74 THOUSANDS/ÂΜL (ref 1.85–7.62)
NEUTS SEG NFR BLD AUTO: 79 % (ref 43–75)
NRBC BLD AUTO-RTO: 0 /100 WBCS
PLATELET # BLD AUTO: 438 THOUSANDS/UL (ref 149–390)
PMV BLD AUTO: 9.2 FL (ref 8.9–12.7)
POTASSIUM SERPL-SCNC: 4 MMOL/L (ref 3.5–5.3)
PROT SERPL-MCNC: 7.8 G/DL (ref 6.4–8.4)
PROTHROMBIN TIME: 13.2 SECONDS (ref 11.6–14.5)
RBC # BLD AUTO: 4.96 MILLION/UL (ref 3.81–5.12)
SODIUM SERPL-SCNC: 137 MMOL/L (ref 135–147)
WBC # BLD AUTO: 8.56 THOUSAND/UL (ref 4.31–10.16)

## 2024-07-13 PROCEDURE — 99285 EMERGENCY DEPT VISIT HI MDM: CPT | Performed by: EMERGENCY MEDICINE

## 2024-07-13 PROCEDURE — 80053 COMPREHEN METABOLIC PANEL: CPT | Performed by: EMERGENCY MEDICINE

## 2024-07-13 PROCEDURE — 83735 ASSAY OF MAGNESIUM: CPT | Performed by: EMERGENCY MEDICINE

## 2024-07-13 PROCEDURE — 70496 CT ANGIOGRAPHY HEAD: CPT

## 2024-07-13 PROCEDURE — 93005 ELECTROCARDIOGRAM TRACING: CPT

## 2024-07-13 PROCEDURE — 83690 ASSAY OF LIPASE: CPT | Performed by: EMERGENCY MEDICINE

## 2024-07-13 PROCEDURE — 84484 ASSAY OF TROPONIN QUANT: CPT | Performed by: EMERGENCY MEDICINE

## 2024-07-13 PROCEDURE — 99284 EMERGENCY DEPT VISIT MOD MDM: CPT

## 2024-07-13 PROCEDURE — 85025 COMPLETE CBC W/AUTO DIFF WBC: CPT | Performed by: EMERGENCY MEDICINE

## 2024-07-13 PROCEDURE — 83605 ASSAY OF LACTIC ACID: CPT | Performed by: EMERGENCY MEDICINE

## 2024-07-13 PROCEDURE — 36415 COLL VENOUS BLD VENIPUNCTURE: CPT | Performed by: EMERGENCY MEDICINE

## 2024-07-13 PROCEDURE — 85610 PROTHROMBIN TIME: CPT | Performed by: EMERGENCY MEDICINE

## 2024-07-13 PROCEDURE — 85730 THROMBOPLASTIN TIME PARTIAL: CPT | Performed by: EMERGENCY MEDICINE

## 2024-07-13 PROCEDURE — 96374 THER/PROPH/DIAG INJ IV PUSH: CPT

## 2024-07-13 PROCEDURE — 70498 CT ANGIOGRAPHY NECK: CPT

## 2024-07-13 RX ORDER — METOPROLOL TARTRATE 1 MG/ML
5 INJECTION, SOLUTION INTRAVENOUS ONCE
Status: COMPLETED | OUTPATIENT
Start: 2024-07-13 | End: 2024-07-13

## 2024-07-13 RX ORDER — AMOXICILLIN AND CLAVULANATE POTASSIUM 875; 125 MG/1; MG/1
1 TABLET, FILM COATED ORAL EVERY 12 HOURS
Qty: 14 TABLET | Refills: 0 | Status: SHIPPED | OUTPATIENT
Start: 2024-07-13 | End: 2024-07-20

## 2024-07-13 RX ORDER — MECLIZINE HYDROCHLORIDE 25 MG/1
25 TABLET ORAL 3 TIMES DAILY PRN
Qty: 30 TABLET | Refills: 0 | Status: SHIPPED | OUTPATIENT
Start: 2024-07-13

## 2024-07-13 RX ORDER — MECLIZINE HYDROCHLORIDE 25 MG/1
25 TABLET ORAL ONCE
Status: COMPLETED | OUTPATIENT
Start: 2024-07-13 | End: 2024-07-13

## 2024-07-13 RX ORDER — AMLODIPINE BESYLATE 5 MG/1
5 TABLET ORAL DAILY
Qty: 20 TABLET | Refills: 0 | Status: SHIPPED | OUTPATIENT
Start: 2024-07-13 | End: 2024-08-02

## 2024-07-13 RX ADMIN — IOHEXOL 100 ML: 350 INJECTION, SOLUTION INTRAVENOUS at 11:31

## 2024-07-13 RX ADMIN — MECLIZINE HYDROCHLORIDE 25 MG: 25 TABLET ORAL at 11:01

## 2024-07-13 RX ADMIN — METOPROLOL TARTRATE 5 MG: 5 INJECTION INTRAVENOUS at 11:00

## 2024-07-13 NOTE — ED PROVIDER NOTES
History  Chief Complaint   Patient presents with    Hypertension     Patient c/o hypertension and dizziness for several days.      Patient with a history of vertigo.  Complains of dizziness for the last few days.  Worse with movement.  Better with laying still.  Was seen by physical therapy for vertigo yesterday and did get some relief.  States her blood pressure has been running high over the past few days.  No history of hypertension.  Family history of hypertension.  Does not take any antihypertensive medications.  Denies any alcohol or drug use.  No change in speech or vision.  No focal weakness or numbness.  No chest pain or shortness of breath.  No abdominal pain or back pain.      History provided by:  Patient   used: No    Hypertension  Severity:  Mild  Onset quality:  Gradual  Duration:  4 days  Timing:  Constant  Progression:  Unchanged  Chronicity:  New  Context: normal sodium, not caffeine and not medication change    Relieved by:  Nothing  Worsened by:  Nothing  Ineffective treatments:  None tried  Associated symptoms: dizziness    Associated symptoms: no abdominal pain, no chest pain, no confusion, no ear pain, no epistaxis, no fatigue, no fever, no headaches, no hematuria, no hypokalemia, no loss of consciousness, no nausea, no neck pain, no palpitations, no peripheral edema, no shortness of breath and not vomiting        Prior to Admission Medications   Prescriptions Last Dose Informant Patient Reported? Taking?   acetaminophen (TYLENOL) 325 mg tablet   Yes No   Sig: Take 650 mg by mouth every 6 (six) hours as needed for mild pain   albuterol (ProAir HFA) 90 mcg/act inhaler   No No   Sig: Inhale 2 puffs every 6 (six) hours as needed for wheezing or shortness of breath   citalopram (CeleXA) 20 mg tablet   No No   Sig: Take 1 tablet daily   ferrous sulfate 325 (65 Fe) mg tablet   Yes No   Sig: Take 325 mg by mouth daily with breakfast   guaiFENesin (MUCINEX PO)   Yes No   Sig: Take  by mouth   Patient not taking: Reported on 2/28/2024   mometasone (ELOCON) 0.1 % cream   No No   Sig: Apply topically daily for 5 days   Patient not taking: Reported on 2/28/2024   omeprazole (PriLOSEC) 20 mg delayed release capsule   No No   Sig: Take 1 capsule (20 mg total) by mouth daily   Patient not taking: Reported on 2/28/2024      Facility-Administered Medications: None       Past Medical History:   Diagnosis Date    Depression     Esophageal stricture     History of gastroesophageal reflux (GERD)     Iron deficiency anemia        Past Surgical History:   Procedure Laterality Date    COLONOSCOPY  2012    GALLBLADDER SURGERY      HYSTERECTOMY      OTHER SURGICAL HISTORY      Endoscopy of stricture 2010, 2012, 5/2014 - Esophageal ulcer; 7/2014 - Esophageal ulcer; 10/2015 (Dr. Jones) - Esophageal stricture; 10/2016; 11/2017 - Stricture       Family History   Problem Relation Age of Onset    Stroke Mother     Hypertension Mother     Emphysema Father      I have reviewed and agree with the history as documented.    E-Cigarette/Vaping    E-Cigarette Use Never User      E-Cigarette/Vaping Substances     Social History     Tobacco Use    Smoking status: Former     Types: Cigarettes    Smokeless tobacco: Never   Vaping Use    Vaping status: Never Used   Substance Use Topics    Alcohol use: Not Currently     Comment: Denies alcohol use - As per [facility]     Drug use: Not Currently       Review of Systems   Constitutional:  Negative for chills, fatigue and fever.   HENT:  Negative for ear pain, hearing loss, nosebleeds, sore throat, trouble swallowing and voice change.    Eyes:  Negative for pain and discharge.   Respiratory:  Negative for cough, shortness of breath and wheezing.    Cardiovascular:  Negative for chest pain and palpitations.   Gastrointestinal:  Negative for abdominal pain, blood in stool, constipation, diarrhea, nausea and vomiting.   Genitourinary:  Negative for dysuria, flank pain, frequency and  hematuria.   Musculoskeletal:  Negative for joint swelling, neck pain and neck stiffness.   Skin:  Negative for rash and wound.   Neurological:  Positive for dizziness. Negative for seizures, loss of consciousness, syncope, facial asymmetry and headaches.   Psychiatric/Behavioral:  Negative for confusion, hallucinations, self-injury and suicidal ideas.    All other systems reviewed and are negative.      Physical Exam  Physical Exam  Vitals and nursing note reviewed.   Constitutional:       General: She is not in acute distress.     Appearance: She is well-developed.   HENT:      Head: Normocephalic and atraumatic.      Right Ear: External ear normal.      Left Ear: External ear normal.   Eyes:      General: No scleral icterus.        Right eye: No discharge.         Left eye: No discharge.      Extraocular Movements: Extraocular movements intact.      Conjunctiva/sclera: Conjunctivae normal.   Cardiovascular:      Rate and Rhythm: Normal rate and regular rhythm.      Heart sounds: Normal heart sounds. No murmur heard.  Pulmonary:      Effort: Pulmonary effort is normal.      Breath sounds: Normal breath sounds. No wheezing or rales.   Abdominal:      General: Bowel sounds are normal. There is no distension.      Palpations: Abdomen is soft.      Tenderness: There is no abdominal tenderness. There is no guarding or rebound.   Musculoskeletal:         General: No deformity. Normal range of motion.      Cervical back: Normal range of motion and neck supple.   Skin:     General: Skin is warm and dry.      Findings: No rash.   Neurological:      General: No focal deficit present.      Mental Status: She is alert and oriented to person, place, and time.      Cranial Nerves: No cranial nerve deficit.   Psychiatric:         Mood and Affect: Mood normal.         Behavior: Behavior normal.         Thought Content: Thought content normal.         Judgment: Judgment normal.         Vital Signs  ED Triage Vitals [07/13/24 1049]    Temperature Pulse Respirations Blood Pressure SpO2   98.4 °F (36.9 °C) (!) 118 20 (!) 180/84 96 %      Temp Source Heart Rate Source Patient Position - Orthostatic VS BP Location FiO2 (%)   Temporal Monitor Lying Left arm --      Pain Score       --           Vitals:    07/13/24 1115 07/13/24 1145 07/13/24 1200 07/13/24 1215   BP: 159/71 156/67 144/67 148/67   Pulse: 79 85 73 77   Patient Position - Orthostatic VS: Lying Lying Lying Lying         Visual Acuity      ED Medications  Medications   metoprolol (LOPRESSOR) injection 5 mg (5 mg Intravenous Given 7/13/24 1100)   meclizine (ANTIVERT) tablet 25 mg (25 mg Oral Given 7/13/24 1101)   iohexol (OMNIPAQUE) 350 MG/ML injection (MULTI-DOSE) 100 mL (100 mL Intravenous Given 7/13/24 1131)       Diagnostic Studies  Results Reviewed       Procedure Component Value Units Date/Time    HS Troponin 0hr (reflex protocol) [[id number]]  (Normal) Collected: 07/13/24 1059    Lab Status: Final result Specimen: Blood from Arm, Right Updated: 07/13/24 1130     hs TnI 0hr 4 ng/L     Lipase [[id number]]  (Normal) Collected: 07/13/24 1059    Lab Status: Final result Specimen: Blood from Arm, Right Updated: 07/13/24 1123     Lipase 44 u/L     Comprehensive metabolic panel [[id number]] Collected: 07/13/24 1059    Lab Status: Final result Specimen: Blood from Arm, Right Updated: 07/13/24 1123     Sodium 137 mmol/L      Potassium 4.0 mmol/L      Chloride 104 mmol/L      CO2 24 mmol/L      ANION GAP 9 mmol/L      BUN 10 mg/dL      Creatinine 0.73 mg/dL      Glucose 106 mg/dL      Calcium 9.8 mg/dL      AST 15 U/L      ALT 8 U/L      Alkaline Phosphatase 84 U/L      Total Protein 7.8 g/dL      Albumin 4.2 g/dL      Total Bilirubin 0.45 mg/dL      eGFR 81 ml/min/1.73sq m     Narrative:      [employer] guidelines for Chronic Kidney Disease (CKD):     Stage 1 with normal or high GFR (GFR > 90 mL/min/1.73 square meters)    Stage 2 Mild CKD (GFR = 60-89 mL/min/1.73 square  meters)    Stage 3A Moderate CKD (GFR = 45-59 mL/min/1.73 square meters)    Stage 3B Moderate CKD (GFR = 30-44 mL/min/1.73 square meters)    Stage 4 Severe CKD (GFR = 15-29 mL/min/1.73 square meters)    Stage 5 End Stage CKD (GFR <15 mL/min/1.73 square meters)  Note: GFR calculation is accurate only with a steady state creatinine    Magnesium [[id number]]  (Normal) Collected: 07/13/24 1059    Lab Status: Final result Specimen: Blood from Arm, Right Updated: 07/13/24 1123     Magnesium 1.9 mg/dL     Lactic acid, plasma (w/reflex if result > 2.0) [[id number]]  (Normal) Collected: 07/13/24 1059    Lab Status: Final result Specimen: Blood from Arm, Right Updated: 07/13/24 1121     LACTIC ACID 1.2 mmol/L     Narrative:      Result may be elevated if tourniquet was used during collection.    Protime-INR [[id number]]  (Normal) Collected: 07/13/24 1059    Lab Status: Final result Specimen: Blood from Arm, Right Updated: 07/13/24 1119     Protime 13.2 seconds      INR 0.97    APTT [[id number]]  (Normal) Collected: 07/13/24 1059    Lab Status: Final result Specimen: Blood from Arm, Right Updated: 07/13/24 1119     PTT 25 seconds     CBC and differential [[id number]]  (Abnormal) Collected: 07/13/24 1059    Lab Status: Final result Specimen: Blood from Arm, Right Updated: 07/13/24 1104     WBC 8.56 Thousand/uL      RBC 4.96 Million/uL      Hemoglobin 14.1 g/dL      Hematocrit 44.9 %      MCV 91 fL      MCH 28.4 pg      MCHC 31.4 g/dL      RDW 15.6 %      MPV 9.2 fL      Platelets 438 Thousands/uL      nRBC 0 /100 WBCs      Segmented % 79 %      Immature Grans % 0 %      Lymphocytes % 13 %      Monocytes % 7 %      Eosinophils Relative 0 %      Basophils Relative 1 %      Absolute Neutrophils 6.74 Thousands/µL      Absolute Immature Grans 0.03 Thousand/uL      Absolute Lymphocytes 1.11 Thousands/µL      Absolute Monocytes 0.60 Thousand/µL      Eosinophils Absolute 0.02 Thousand/µL      Basophils Absolute 0.06 Thousands/µL                     CTA head and neck with and without contrast   Final Result by Jacek Coelho DO (07/13 1225)      CT Brain: Mild chronic microangiopathic changes are noted. Chronic lacunar infarct right frontal deep white matter. Left mastoid air cell opacification.      CT Angiography:  Unremarkable CTA neck and brain.                  Workstation performed: [id number]                    Procedures  ECG 12 Lead Documentation Only    Date/Time: 7/13/2024 10:53 AM    Performed by: Shekhar Mensah MD  Authorized by: Shekhar Mensah MD    ECG reviewed by me, the ED Provider: yes    Patient location:  ED  Rate:     ECG rate:  115  Rhythm:     Rhythm: sinus rhythm    Ectopy:     Ectopy: none    QRS:     QRS axis:  Normal           ED Course  ED Course as of 07/13/24 1232   Sat Jul 13, 2024   1122 Blood pressure now 159/71.  Heart rate of 76.   1131 Blood pressure 139/84.  Heart rate of 70.   1143 Patient seen.  Symptoms better.  Neurologic exam is nonfocal.   1230 Discussed with patient lab and CAT scan results.                                   SBIRT 22yo+      Flowsheet Row Most Recent Value   Initial Alcohol Screen: US AUDIT-C     1. How often do you have a drink containing alcohol? 0 Filed at: 07/13/2024 1108   2. How many drinks containing alcohol do you have on a typical day you are drinking?  0 Filed at: 07/13/2024 1108   3b. FEMALE Any Age, or MALE 65+: How often do you have 4 or more drinks on one occassion? 0 Filed at: 07/13/2024 1108   Audit-C Score 0 Filed at: 07/13/2024 1108   SARAH: How many times in the past year have you...    Used an illegal drug or used a prescription medication for non-medical reasons? Never Filed at: 07/13/2024 1108                      Medical Decision Making  Amount and/or Complexity of Data Reviewed  Labs: ordered. Decision-making details documented in ED Course.  Radiology: ordered. Decision-making details documented in ED Course.  ECG/medicine tests: ordered and independent  interpretation performed. Decision-making details documented in ED Course.  Discussion of management or test interpretation with external provider(s): Differential diagnosis includes but not limited to STEMI, NSTEMI, PE, pneumonia, pneumothorax, musculoskeletal chest pain, costochondritis, gastritis, cholelithiasis, contusion, strain    Risk  Prescription drug management.                 Disposition  Final diagnoses:   Hypertensive urgency   Vertigo     Time reflects when diagnosis was documented in both MDM as applicable and the Disposition within this note       Time User Action Codes Description Comment    7/13/2024 11:43 AM Shekhar Mensah Add [I16.0] Hypertensive urgency     7/13/2024 11:43 AM Shekhar Mensah Add [R42] Vertigo           ED Disposition       ED Disposition   Discharge    Condition   Stable    Date/Time   Sat Jul 13, 2024 1144    Comment   Bea Perez discharge to home/self care.                   Follow-up Information       Follow up With Specialties Details Why Contact Info    Joyce Mcpherson MD  Call in 2 days  [address] [place] [address]  [phone]              Patient's Medications   Discharge Prescriptions    AMLODIPINE (NORVASC) 5 MG TABLET    Take 1 tablet (5 mg total) by mouth daily for 20 days       Start Date: 7/13/2024 End Date: 8/2/2024       Order Dose: 5 mg       Quantity: 20 tablet    Refills: 0    AMOXICILLIN-CLAVULANATE (AUGMENTIN) 875-125 MG PER TABLET    Take 1 tablet by mouth every 12 (twelve) hours for 7 days       Start Date: 7/13/2024 End Date: 7/20/2024       Order Dose: 1 tablet       Quantity: 14 tablet    Refills: 0    MECLIZINE (ANTIVERT) 25 MG TABLET    Take 1 tablet (25 mg total) by mouth 3 (three) times a day as needed for dizziness       Start Date: 7/13/2024 End Date: --       Order Dose: 25 mg       Quantity: 30 tablet    Refills: 0       No discharge procedures on file.    PDMP Review       None            ED Provider  Electronically  Signed by             Shekhar Menash MD  07/13/24 8753     Contraindicated

## 2024-07-14 ENCOUNTER — APPOINTMENT (EMERGENCY)
Dept: RADIOLOGY | Facility: HOSPITAL | Age: 74
End: 2024-07-14
Payer: COMMERCIAL

## 2024-07-14 ENCOUNTER — HOSPITAL ENCOUNTER (EMERGENCY)
Facility: HOSPITAL | Age: 74
Discharge: HOME | End: 2024-07-14
Payer: COMMERCIAL

## 2024-07-14 VITALS
BODY MASS INDEX: 22.69 KG/M2 | OXYGEN SATURATION: 96 % | SYSTOLIC BLOOD PRESSURE: 157 MMHG | DIASTOLIC BLOOD PRESSURE: 70 MMHG | TEMPERATURE: 97.3 F | WEIGHT: 128.09 LBS | RESPIRATION RATE: 18 BRPM | HEART RATE: 105 BPM

## 2024-07-14 DIAGNOSIS — R00.2 PALPITATIONS: Primary | ICD-10-CM

## 2024-07-14 LAB
ALBUMIN SERPL BCG-MCNC: 4.1 G/DL (ref 3.5–5)
ALP SERPL-CCNC: 80 U/L (ref 34–104)
ALT SERPL W P-5'-P-CCNC: 9 U/L (ref 7–52)
ANION GAP SERPL CALCULATED.3IONS-SCNC: 8 MMOL/L (ref 4–13)
APTT PPP: 25 SECONDS (ref 23–37)
AST SERPL W P-5'-P-CCNC: 14 U/L (ref 13–39)
ATRIAL RATE: 115 BPM
BASOPHILS # BLD AUTO: 0.08 THOUSANDS/ÂΜL (ref 0–0.1)
BASOPHILS NFR BLD AUTO: 1 % (ref 0–1)
BILIRUB SERPL-MCNC: 0.28 MG/DL (ref 0.2–1)
BUN SERPL-MCNC: 14 MG/DL (ref 5–25)
CALCIUM SERPL-MCNC: 9.7 MG/DL (ref 8.4–10.2)
CARDIAC TROPONIN I PNL SERPL HS: 5 NG/L
CHLORIDE SERPL-SCNC: 105 MMOL/L (ref 96–108)
CO2 SERPL-SCNC: 26 MMOL/L (ref 21–32)
CREAT SERPL-MCNC: 0.84 MG/DL (ref 0.6–1.3)
EOSINOPHIL # BLD AUTO: 0.06 THOUSAND/ÂΜL (ref 0–0.61)
EOSINOPHIL NFR BLD AUTO: 1 % (ref 0–6)
ERYTHROCYTE [DISTWIDTH] IN BLOOD BY AUTOMATED COUNT: 15.5 % (ref 11.6–15.1)
GFR SERPL CREATININE-BSD FRML MDRD: 69 ML/MIN/1.73SQ M
GLUCOSE SERPL-MCNC: 104 MG/DL (ref 65–140)
HCT VFR BLD AUTO: 41.9 % (ref 34.8–46.1)
HGB BLD-MCNC: 13.3 G/DL (ref 11.5–15.4)
IMM GRANULOCYTES # BLD AUTO: 0.02 THOUSAND/UL (ref 0–0.2)
IMM GRANULOCYTES NFR BLD AUTO: 0 % (ref 0–2)
INR PPP: 0.95 (ref 0.84–1.19)
LYMPHOCYTES # BLD AUTO: 1.56 THOUSANDS/ÂΜL (ref 0.6–4.47)
LYMPHOCYTES NFR BLD AUTO: 19 % (ref 14–44)
MCH RBC QN AUTO: 28.8 PG (ref 26.8–34.3)
MCHC RBC AUTO-ENTMCNC: 31.7 G/DL (ref 31.4–37.4)
MCV RBC AUTO: 91 FL (ref 82–98)
MONOCYTES # BLD AUTO: 0.8 THOUSAND/ÂΜL (ref 0.17–1.22)
MONOCYTES NFR BLD AUTO: 10 % (ref 4–12)
NEUTROPHILS # BLD AUTO: 5.66 THOUSANDS/ÂΜL (ref 1.85–7.62)
NEUTS SEG NFR BLD AUTO: 69 % (ref 43–75)
NRBC BLD AUTO-RTO: 0 /100 WBCS
P AXIS: 72 DEGREES
PLATELET # BLD AUTO: 446 THOUSANDS/UL (ref 149–390)
PMV BLD AUTO: 9.3 FL (ref 8.9–12.7)
POTASSIUM SERPL-SCNC: 4.1 MMOL/L (ref 3.5–5.3)
PR INTERVAL: 170 MS
PROT SERPL-MCNC: 7.4 G/DL (ref 6.4–8.4)
PROTHROMBIN TIME: 13 SECONDS (ref 11.6–14.5)
QRS AXIS: -32 DEGREES
QRSD INTERVAL: 100 MS
QT INTERVAL: 330 MS
QTC INTERVAL: 456 MS
RBC # BLD AUTO: 4.62 MILLION/UL (ref 3.81–5.12)
SODIUM SERPL-SCNC: 139 MMOL/L (ref 135–147)
T WAVE AXIS: 86 DEGREES
TSH SERPL DL<=0.05 MIU/L-ACNC: 1.8 UIU/ML (ref 0.45–4.5)
VENTRICULAR RATE: 115 BPM
WBC # BLD AUTO: 8.18 THOUSAND/UL (ref 4.31–10.16)

## 2024-07-14 PROCEDURE — 85730 THROMBOPLASTIN TIME PARTIAL: CPT | Performed by: PHYSICIAN ASSISTANT

## 2024-07-14 PROCEDURE — 93005 ELECTROCARDIOGRAM TRACING: CPT

## 2024-07-14 PROCEDURE — 93010 ELECTROCARDIOGRAM REPORT: CPT | Performed by: INTERNAL MEDICINE

## 2024-07-14 PROCEDURE — 84443 ASSAY THYROID STIM HORMONE: CPT | Performed by: PHYSICIAN ASSISTANT

## 2024-07-14 PROCEDURE — 84484 ASSAY OF TROPONIN QUANT: CPT | Performed by: PHYSICIAN ASSISTANT

## 2024-07-14 PROCEDURE — 80053 COMPREHEN METABOLIC PANEL: CPT | Performed by: PHYSICIAN ASSISTANT

## 2024-07-14 PROCEDURE — 36415 COLL VENOUS BLD VENIPUNCTURE: CPT | Performed by: PHYSICIAN ASSISTANT

## 2024-07-14 PROCEDURE — 99284 EMERGENCY DEPT VISIT MOD MDM: CPT | Performed by: PHYSICIAN ASSISTANT

## 2024-07-14 PROCEDURE — 71045 X-RAY EXAM CHEST 1 VIEW: CPT

## 2024-07-14 PROCEDURE — 85025 COMPLETE CBC W/AUTO DIFF WBC: CPT | Performed by: PHYSICIAN ASSISTANT

## 2024-07-14 PROCEDURE — 99284 EMERGENCY DEPT VISIT MOD MDM: CPT

## 2024-07-14 PROCEDURE — 85610 PROTHROMBIN TIME: CPT | Performed by: PHYSICIAN ASSISTANT

## 2024-07-14 NOTE — DISCHARGE INSTRUCTIONS
Please remain on current medication.  Please follow-up with your family doctor.  Please return with new or worsening symptoms

## 2024-07-14 NOTE — ED PROVIDER NOTES
History  Chief Complaint   Patient presents with    Medication Problem     Patient started on 3 different medications yesterday and is now concerned she may be having a reaction. Patient just feels jittery and as though her heart is racing. Patient denies any sob or difficulty breathing.      73-year-old female presents emergency department for evaluation of palpitations.  Patient states she was in the emergency department yesterday for dizziness and hypertension.  States she had a CAT scan of her head which also showed a sinus infection.  States she was started on 3 different medications including Norvasc for her high blood pressure, Antivert for the dizziness and Augmentin for her sinus infection.  States she has been taking these as prescribed.  States after taking her morning dose she felt jittery and like her heart was racing.  She denies any chest pain dizziness shortness of breath or difficulty breathing.  She denies any sore throat or throat swelling sensation.  Patient states she was concerned this was an allergic reaction to one of the medications.  Patient does admit to feeling very anxious.  Has a follow-up appoint with her PCP tomorrow morning.         Prior to Admission Medications   Prescriptions Last Dose Informant Patient Reported? Taking?   acetaminophen (TYLENOL) 325 mg tablet   Yes No   Sig: Take 650 mg by mouth every 6 (six) hours as needed for mild pain   albuterol (ProAir HFA) 90 mcg/act inhaler   No No   Sig: Inhale 2 puffs every 6 (six) hours as needed for wheezing or shortness of breath   amLODIPine (NORVASC) 5 mg tablet   No No   Sig: Take 1 tablet (5 mg total) by mouth daily for 20 days   amoxicillin-clavulanate (AUGMENTIN) 875-125 mg per tablet   No No   Sig: Take 1 tablet by mouth every 12 (twelve) hours for 7 days   citalopram (CeleXA) 20 mg tablet   No No   Sig: Take 1 tablet daily   ferrous sulfate 325 (65 Fe) mg tablet   Yes No   Sig: Take 325 mg by mouth daily with breakfast    guaiFENesin (MUCINEX PO)   Yes No   Sig: Take by mouth   Patient not taking: Reported on 2/28/2024   meclizine (ANTIVERT) 25 mg tablet   No No   Sig: Take 1 tablet (25 mg total) by mouth 3 (three) times a day as needed for dizziness   mometasone (ELOCON) 0.1 % cream   No No   Sig: Apply topically daily for 5 days   Patient not taking: Reported on 2/28/2024   omeprazole (PriLOSEC) 20 mg delayed release capsule   No No   Sig: Take 1 capsule (20 mg total) by mouth daily   Patient not taking: Reported on 2/28/2024      Facility-Administered Medications: None       Past Medical History:   Diagnosis Date    Depression     Esophageal stricture     History of gastroesophageal reflux (GERD)     Iron deficiency anemia        Past Surgical History:   Procedure Laterality Date    COLONOSCOPY  2012    GALLBLADDER SURGERY      HYSTERECTOMY      OTHER SURGICAL HISTORY      Endoscopy of stricture 2010, 2012, 5/2014 - Esophageal ulcer; 7/2014 - Esophageal ulcer; 10/2015 (Dr. Jones) - Esophageal stricture; 10/2016; 11/2017 - Stricture       Family History   Problem Relation Age of Onset    Stroke Mother     Hypertension Mother     Emphysema Father      I have reviewed and agree with the history as documented.    E-Cigarette/Vaping    E-Cigarette Use Never User      E-Cigarette/Vaping Substances     Social History     Tobacco Use    Smoking status: Former     Types: Cigarettes    Smokeless tobacco: Never   Vaping Use    Vaping status: Never Used   Substance Use Topics    Alcohol use: Not Currently     Comment: Denies alcohol use - As per [employer]     Drug use: Not Currently       Review of Systems   Constitutional: Negative.  Negative for appetite change, fatigue and fever.   Respiratory: Negative.     Cardiovascular:  Positive for palpitations. Negative for chest pain and leg swelling.   Gastrointestinal: Negative.    Musculoskeletal: Negative.    Skin: Negative.    Neurological: Negative.    Psychiatric/Behavioral:  The patient is  nervous/anxious.    All other systems reviewed and are negative.      Physical Exam  Physical Exam  Vitals and nursing note reviewed.   Constitutional:       General: She is not in acute distress.     Appearance: Normal appearance. She is not ill-appearing, toxic-appearing or diaphoretic.   HENT:      Head: Normocephalic.      Nose: Nose normal.      Mouth/Throat:      Mouth: Mucous membranes are moist.   Eyes:      Conjunctiva/sclera: Conjunctivae normal.      Pupils: Pupils are equal, round, and reactive to light.   Cardiovascular:      Rate and Rhythm: Regular rhythm. Tachycardia present.   Pulmonary:      Effort: Pulmonary effort is normal.      Breath sounds: Normal breath sounds. No stridor. No wheezing, rhonchi or rales.   Chest:      Chest wall: No tenderness.   Abdominal:      General: Bowel sounds are normal. There is no distension.      Palpations: Abdomen is soft.      Tenderness: There is no abdominal tenderness.   Musculoskeletal:         General: Normal range of motion.      Cervical back: Normal range of motion.   Skin:     General: Skin is warm and dry.      Findings: No bruising, erythema or rash.   Neurological:      General: No focal deficit present.      Mental Status: She is alert and oriented to person, place, and time.      Sensory: No sensory deficit.      Motor: No weakness.      Coordination: Coordination normal.      Gait: Gait normal.   Psychiatric:         Mood and Affect: Mood is anxious.         Vital Signs  ED Triage Vitals   Temperature Pulse Respirations Blood Pressure SpO2   07/14/24 1435 07/14/24 1435 07/14/24 1435 07/14/24 1435 07/14/24 1435   (!) 97.3 °F (36.3 °C) (!) 109 18 127/69 100 %      Temp Source Heart Rate Source Patient Position - Orthostatic VS BP Location FiO2 (%)   07/14/24 1435 07/14/24 1435 07/14/24 1435 07/14/24 1435 --   Temporal Monitor Lying Left arm       Pain Score       07/14/24 1615       No Pain           Vitals:    07/14/24 1435 07/14/24 1615   BP:  127/69 157/70   Pulse: (!) 109 105   Patient Position - Orthostatic VS: Lying Lying         Visual Acuity      ED Medications  Medications - No data to display    Diagnostic Studies  Results Reviewed       Procedure Component Value Units Date/Time    TSH, 3rd generation with Free T4 reflex [[id number]]  (Normal) Collected: 07/14/24 1507    Lab Status: Final result Specimen: Blood from Arm, Right Updated: 07/14/24 1544     TSH 3RD GENERATON 1.796 uIU/mL     HS Troponin 0hr (reflex protocol) [[id number]]  (Normal) Collected: 07/14/24 1507    Lab Status: Final result Specimen: Blood from Arm, Right Updated: 07/14/24 1535     hs TnI 0hr 5 ng/L     Comprehensive metabolic panel [[id number]] Collected: 07/14/24 1507    Lab Status: Final result Specimen: Blood from Arm, Right Updated: 07/14/24 1530     Sodium 139 mmol/L      Potassium 4.1 mmol/L      Chloride 105 mmol/L      CO2 26 mmol/L      ANION GAP 8 mmol/L      BUN 14 mg/dL      Creatinine 0.84 mg/dL      Glucose 104 mg/dL      Calcium 9.7 mg/dL      AST 14 U/L      ALT 9 U/L      Alkaline Phosphatase 80 U/L      Total Protein 7.4 g/dL      Albumin 4.1 g/dL      Total Bilirubin 0.28 mg/dL      eGFR 69 ml/min/1.73sq m     Narrative:      National Kid[employer] Foundation guidelines for Chronic Kidney Disease (CKD):     Stage 1 with normal or high GFR (GFR > 90 mL/min/1.73 square meters)    Stage 2 Mild CKD (GFR = 60-89 mL/min/1.73 square meters)    Stage 3A Moderate CKD (GFR = 45-59 mL/min/1.73 square meters)    Stage 3B Moderate CKD (GFR = 30-44 mL/min/1.73 square meters)    Stage 4 Severe CKD (GFR = 15-29 mL/min/1.73 square meters)    Stage 5 End Stage CKD (GFR <15 mL/min/1.73 square meters)  Note: GFR calculation is accurate only with a steady state creatinine    Protime-INR [[id number]]  (Normal) Collected: 07/14/24 1507    Lab Status: Final result Specimen: Blood from Arm, Right Updated: 07/14/24 1525     Protime 13.0 seconds      INR 0.95    APTT [[id number]]   (Normal) Collected: 07/14/24 1507    Lab Status: Final result Specimen: Blood from Arm, Right Updated: 07/14/24 1525     PTT 25 seconds     CBC and differential [[id number]]  (Abnormal) Collected: 07/14/24 1507    Lab Status: Final result Specimen: Blood from Arm, Right Updated: 07/14/24 1513     WBC 8.18 Thousand/uL      RBC 4.62 Million/uL      Hemoglobin 13.3 g/dL      Hematocrit 41.9 %      MCV 91 fL      MCH 28.8 pg      MCHC 31.7 g/dL      RDW 15.5 %      MPV 9.3 fL      Platelets 446 Thousands/uL      nRBC 0 /100 WBCs      Segmented % 69 %      Immature Grans % 0 %      Lymphocytes % 19 %      Monocytes % 10 %      Eosinophils Relative 1 %      Basophils Relative 1 %      Absolute Neutrophils 5.66 Thousands/µL      Absolute Immature Grans 0.02 Thousand/uL      Absolute Lymphocytes 1.56 Thousands/µL      Absolute Monocytes 0.80 Thousand/µL      Eosinophils Absolute 0.06 Thousand/µL      Basophils Absolute 0.08 Thousands/µL                    XR chest 1 view portable   Final Result by Gisell Haddad MD (07/14 1848)      No acute cardiopulmonary disease.            Workstation performed: [id number]                    Procedures  ECG 12 Lead Documentation Only    Date/Time: 7/14/2024 3:00 PM    Performed by: Darlene Krause PA-C  Authorized by: Darlene Krause PA-C    Patient location:  ED  Rate:     ECG rate:  107    ECG rate assessment: normal    Rhythm:     Rhythm: sinus rhythm    Ectopy:     Ectopy: none    QRS:     QRS axis:  Normal    QRS intervals:  Normal           ED Course  ED Course as of 07/15/24 0959   Sun Jul 14, 2024   1546 TSH 3RD GENERATON: 1.796   1546 WBC: 8.18   1546 Comprehensive metabolic panel  Unremarkable    1546 hs TnI 0hr: 5   1600 Patient reports feeling completely resolved.  Resting comfortably.  Requesting discharge.  I did discuss all results and findings with her.  We discussed symptomatic treatment at home.  Patient has follow-up with her PCP tomorrow morning.  We discussed  return precautions and she verbalized understanding.  She was clinically hemodynamically stable for discharge                                 SBIRT 20yo+      Flowsheet Row Most Recent Value   Initial Alcohol Screen: US AUDIT-C     1. How often do you have a drink containing alcohol? 0 Filed at: 07/14/2024 1527   2. How many drinks containing alcohol do you have on a typical day you are drinking?  0 Filed at: 07/14/2024 1527   3b. FEMALE Any Age, or MALE 65+: How often do you have 4 or more drinks on one occassion? 0 Filed at: 07/14/2024 1527   Audit-C Score 0 Filed at: 07/14/2024 1527   SARAH: How many times in the past year have you...    Used an illegal drug or used a prescription medication for non-medical reasons? Never Filed at: 07/14/2024 1527                      Medical Decision Making  73-year-old female presented to the emergency department for evaluation of feeling jittery with palpitations after taking multiple new medications.  Vitals and medical record reviewed.  Patient at risk for the following but not limited to allergic reaction, anxiety, electrolyte abnormality, thyroid abnormality.  History and physical exam are relatively unremarkable.  Patient does appear anxious.  Heart regular rate and rhythm.  EKG nonischemic.  Having no difficulty breathing.  Lower concern for an allergic reaction.  No significant electrolyte abnormality.  TSH within normal limits.  States she had complete resolution of symptoms while relaxing in the emergency department.  She has follow-up with PCP tomorrow.  We discussed tricked return precautions and she verbalized understanding.  She is clinically and hemodynamically stable for discharge    Amount and/or Complexity of Data Reviewed  Labs: ordered. Decision-making details documented in ED Course.  Radiology: ordered.                 Disposition  Final diagnoses:   Palpitations     Time reflects when diagnosis was documented in both MDM as applicable and the Disposition  within this note       Time User Action Codes Description Comment    7/14/2024  4:28 PM Darlene Krause Add [R00.2] Palpitations           ED Disposition       ED Disposition   Discharge    Condition   Stable    Date/Time   Sun Jul 14, 2024 1628    Comment   Bea Perez discharge to home/self care.                   Follow-up Information       Follow up With Specialties Details Why Contact Info    Joyce Mcpherson MD    [address] [place] [id number]  [phone]              Discharge Medication List as of 7/14/2024  4:29 PM        CONTINUE these medications which have NOT CHANGED    Details   acetaminophen (TYLENOL) 325 mg tablet Take 650 mg by mouth every 6 (six) hours as needed for mild pain, Historical Med      albuterol (ProAir HFA) 90 mcg/act inhaler Inhale 2 puffs every 6 (six) hours as needed for wheezing or shortness of breath, Starting Wed 2/28/2024, Normal      amLODIPine (NORVASC) 5 mg tablet Take 1 tablet (5 mg total) by mouth daily for 20 days, Starting Sat 7/13/2024, Until Fri 8/2/2024, Normal      amoxicillin-clavulanate (AUGMENTIN) 875-125 mg per tablet Take 1 tablet by mouth every 12 (twelve) hours for 7 days, Starting Sat 7/13/2024, Until Sat 7/20/2024, Normal      citalopram (CeleXA) 20 mg tablet Take 1 tablet daily, Normal      ferrous sulfate 325 (65 Fe) mg tablet Take 325 mg by mouth daily with breakfast, Historical Med      guaiFENesin (MUCINEX PO) Take by mouth, Historical Med      meclizine (ANTIVERT) 25 mg tablet Take 1 tablet (25 mg total) by mouth 3 (three) times a day as needed for dizziness, Starting Sat 7/13/2024, Normal      mometasone (ELOCON) 0.1 % cream Apply topically daily for 5 days, Starting Sun 7/22/2018, Until Wed 1/31/2024, Normal      omeprazole (PriLOSEC) 20 mg delayed release capsule Take 1 capsule (20 mg total) by mouth daily, Starting Mon 5/24/2021, Normal             No discharge procedures on file.    PDMP Review       None            ED  Provider  Electronically Signed by             Darlene Krause PA-C  07/15/24 0959       Darlene Krause PA-C  07/15/24 0959

## 2024-07-15 LAB
ATRIAL RATE: 107 BPM
P AXIS: 75 DEGREES
PR INTERVAL: 170 MS
QRS AXIS: -45 DEGREES
QRSD INTERVAL: 94 MS
QT INTERVAL: 340 MS
QTC INTERVAL: 453 MS
T WAVE AXIS: 77 DEGREES
VENTRICULAR RATE: 107 BPM

## 2024-07-15 PROCEDURE — 93010 ELECTROCARDIOGRAM REPORT: CPT | Performed by: INTERNAL MEDICINE

## 2024-07-15 NOTE — ED ATTENDING ATTESTATION
7/14/2024  I, Zenaida Ortiz DO, discussed the patient with the resident/non-physician practitioner and agree with the resident's/non-physician practitioner's findings, Plan of Care, and MDM as documented in the resident's/non-physician practitioner's note, except where noted. All available labs and Radiology studies were reviewed.  I was present for key portions of any procedure(s) performed by the resident/non-physician practitioner and I was immediately available to provide assistance.       At this point I agree with the current assessment done in the Emergency Department.

## 2024-07-17 ENCOUNTER — HOSPITAL ENCOUNTER (EMERGENCY)
Facility: HOSPITAL | Age: 74
Discharge: HOME | End: 2024-07-17
Attending: EMERGENCY MEDICINE
Payer: COMMERCIAL

## 2024-07-17 VITALS
HEIGHT: 63 IN | OXYGEN SATURATION: 96 % | SYSTOLIC BLOOD PRESSURE: 173 MMHG | TEMPERATURE: 98 F | HEART RATE: 93 BPM | RESPIRATION RATE: 25 BRPM | BODY MASS INDEX: 23.01 KG/M2 | WEIGHT: 129.85 LBS | DIASTOLIC BLOOD PRESSURE: 76 MMHG

## 2024-07-17 DIAGNOSIS — R42 DIZZINESS: Primary | ICD-10-CM

## 2024-07-17 DIAGNOSIS — T88.7XXA MEDICATION SIDE EFFECT: ICD-10-CM

## 2024-07-17 DIAGNOSIS — I10 UNCONTROLLED HYPERTENSION: ICD-10-CM

## 2024-07-17 DIAGNOSIS — J32.9 SINUSITIS: ICD-10-CM

## 2024-07-17 PROCEDURE — 99284 EMERGENCY DEPT VISIT MOD MDM: CPT

## 2024-07-17 PROCEDURE — 93005 ELECTROCARDIOGRAM TRACING: CPT

## 2024-07-17 PROCEDURE — 99284 EMERGENCY DEPT VISIT MOD MDM: CPT | Performed by: EMERGENCY MEDICINE

## 2024-07-17 NOTE — ED PROVIDER NOTES
History  Chief Complaint   Patient presents with    Dizziness     Pt seen last week for elevated BP- followed up with family doctor and started on beta-blockers, states since then BP has been fluctuating and pt reports being dizzy and lightheaded     73-year-old female accompanied by spouse describes recent ER visit for dizziness and elevated blood pressure, diagnosed with sinusitis, taking antibiotics amlodipine and meclizine as needed with increased fatigue, seen by primary care and added propranolol.  Also, mentions anxiety.  No chest pain or dyspnea.  No headache or confusion or speech changes.  No nausea, vomiting or diarrhea.  No lower urinary tract symptoms.  Spouse notes recently had blood work completed      History provided by:  Patient  Dizziness  Quality:  Lightheadedness  Severity:  Mild  Onset quality:  Gradual  Timing:  Intermittent  Progression:  Waxing and waning  Chronicity:  New  Context: head movement    Relieved by:  Being still  Ineffective treatments:  Medication  Associated symptoms: no chest pain and no shortness of breath    Risk factors: new medications        Prior to Admission Medications   Prescriptions Last Dose Informant Patient Reported? Taking?   acetaminophen (TYLENOL) 325 mg tablet   Yes No   Sig: Take 650 mg by mouth every 6 (six) hours as needed for mild pain   albuterol (ProAir HFA) 90 mcg/act inhaler   No No   Sig: Inhale 2 puffs every 6 (six) hours as needed for wheezing or shortness of breath   amLODIPine (NORVASC) 5 mg tablet   No No   Sig: Take 1 tablet (5 mg total) by mouth daily for 20 days   amoxicillin-clavulanate (AUGMENTIN) 875-125 mg per tablet   No No   Sig: Take 1 tablet by mouth every 12 (twelve) hours for 7 days   citalopram (CeleXA) 20 mg tablet   No No   Sig: Take 1 tablet daily   ferrous sulfate 325 (65 Fe) mg tablet   Yes No   Sig: Take 325 mg by mouth daily with breakfast   guaiFENesin (MUCINEX PO)   Yes No   Sig: Take by mouth   Patient not taking:  Reported on 2/28/2024   meclizine (ANTIVERT) 25 mg tablet   No No   Sig: Take 1 tablet (25 mg total) by mouth 3 (three) times a day as needed for dizziness   mometasone (ELOCON) 0.1 % cream   No No   Sig: Apply topically daily for 5 days   Patient not taking: Reported on 2/28/2024   omeprazole (PriLOSEC) 20 mg delayed release capsule   No No   Sig: Take 1 capsule (20 mg total) by mouth daily   Patient not taking: Reported on 2/28/2024      Facility-Administered Medications: None       Past Medical History:   Diagnosis Date    Depression     Esophageal stricture     History of gastroesophageal reflux (GERD)     Iron deficiency anemia        Past Surgical History:   Procedure Laterality Date    COLONOSCOPY  2012    GALLBLADDER SURGERY      HYSTERECTOMY      OTHER SURGICAL HISTORY      Endoscopy of stricture 2010, 2012, 5/2014 - Esophageal ulcer; 7/2014 - Esophageal ulcer; 10/2015 (Dr. Jones) - Esophageal stricture; 10/2016; 11/2017 - Stricture       Family History   Problem Relation Age of Onset    Stroke Mother     Hypertension Mother     Emphysema Father      I have reviewed and agree with the history as documented.    E-Cigarette/Vaping    E-Cigarette Use Never User      E-Cigarette/Vaping Substances     Social History     Tobacco Use    Smoking status: Former     Types: Cigarettes    Smokeless tobacco: Never   Vaping Use    Vaping status: Never Used   Substance Use Topics    Alcohol use: Not Currently     Comment: Denies alcohol use - As per [employer]     Drug use: Not Currently       Review of Systems   Respiratory:  Negative for shortness of breath.    Cardiovascular:  Negative for chest pain.   Neurological:  Positive for dizziness.   All other systems reviewed and are negative.      Physical Exam  Physical Exam  Vitals and nursing note reviewed.   Constitutional:       Comments: Pleasant, comfortable-appearing   HENT:      Head: Normocephalic and atraumatic.      Mouth/Throat:      Mouth: Mucous membranes are  moist.      Pharynx: Oropharynx is clear.   Eyes:      Conjunctiva/sclera: Conjunctivae normal.      Pupils: Pupils are equal, round, and reactive to light.   Cardiovascular:      Rate and Rhythm: Normal rate and regular rhythm.      Heart sounds: Normal heart sounds.   Pulmonary:      Effort: Pulmonary effort is normal.      Breath sounds: Normal breath sounds.   Abdominal:      General: Bowel sounds are normal. There is no distension.      Palpations: Abdomen is soft.      Tenderness: There is no abdominal tenderness.   Musculoskeletal:         General: No deformity.      Cervical back: Neck supple.   Skin:     General: Skin is warm and dry.   Neurological:      General: No focal deficit present.      Mental Status: She is alert and oriented to person, place, and time.      Cranial Nerves: No cranial nerve deficit.      Coordination: Coordination normal.   Psychiatric:         Behavior: Behavior normal.         Thought Content: Thought content normal.         Judgment: Judgment normal.         Vital Signs  ED Triage Vitals [07/17/24 1552]   Temperature Pulse Respirations Blood Pressure SpO2   98 °F (36.7 °C) 103 18 142/76 97 %      Temp Source Heart Rate Source Patient Position - Orthostatic VS BP Location FiO2 (%)   Temporal Monitor Sitting Left arm --      Pain Score       --           Vitals:    07/17/24 1552 07/17/24 1600 07/17/24 1615   BP: 142/76 156/72 161/69   Pulse: 103 96 95   Patient Position - Orthostatic VS: Sitting           Visual Acuity      ED Medications  Medications - No data to display    Diagnostic Studies  Results Reviewed       None                   No orders to display              Procedures  Procedures         ED Course  ED Course as of 07/17/24 1723   Wed Jul 17, 2024   1657 EKG 1644 normal sinus rhythm rate 92 left axis normal intervals no ST elevation or depression septal Q waves interpreted by me   1720 Intermittent dizziness with standing or walking but resolves, tolerates.  Agrees  to continue antihypertensive, blood pressure 160/77.  Contact her primary care physician for further direction and evaluation, agrees to return if worse or additional symptoms, spouse present and supportive                    Stroke Assessment       Row Name 07/17/24 1634             NIH Stroke Scale    Interval Baseline      Level of Consciousness (1a.) 0      LOC Questions (1b.) 0      LOC Commands (1c.) 0      Best Gaze (2.) 0      Visual (3.) 0      Facial Palsy (4.) 0      Motor Arm, Left (5a.) 0      Motor Arm, Right (5b.) 0      Motor Leg, Left (6a.) 0      Motor Leg, Right (6b.) 0      Limb Ataxia (7.) 0      Sensory (8.) 0      Best Language (9.) 0      Dysarthria (10.) 0      Extinction and Inattention (11.) (Formerly Neglect) 0      Total 0                     Stroke Assessment       Row Name 07/17/24 1634             NIH Stroke Scale    Interval Baseline      Level of Consciousness (1a.) 0      LOC Questions (1b.) 0      LOC Commands (1c.) 0      Best Gaze (2.) 0      Visual (3.) 0      Facial Palsy (4.) 0      Motor Arm, Left (5a.) 0      Motor Arm, Right (5b.) 0      Motor Leg, Left (6a.) 0      Motor Leg, Right (6b.) 0      Limb Ataxia (7.) 0      Sensory (8.) 0      Best Language (9.) 0      Dysarthria (10.) 0      Extinction and Inattention (11.) (Formerly Neglect) 0      Total 0                                SBIRT 22yo+      Flowsheet Row Most Recent Value   Initial Alcohol Screen: US AUDIT-C     1. How often do you have a drink containing alcohol? 0 Filed at: 07/17/2024 1551   2. How many drinks containing alcohol do you have on a typical day you are drinking?  0 Filed at: 07/17/2024 1551   3b. FEMALE Any Age, or MALE 65+: How often do you have 4 or more drinks on one occassion? 0 Filed at: 07/17/2024 1551   Audit-C Score 0 Filed at: 07/17/2024 1551   SARAH: How many times in the past year have you...    Used an illegal drug or used a prescription medication for non-medical reasons? Never Filed  at: 07/17/2024 1551                      Medical Decision Making  This patient presents with dizziness, uncontrolled blood pressure.   Diagnostic considerations include persistent sinusitis, medication side effects, dysrhythmia. See ED Course.                    Disposition  Final diagnoses:   Dizziness   Uncontrolled hypertension   Sinusitis   Medication side effect     Time reflects when diagnosis was documented in both MDM as applicable and the Disposition within this note       Time User Action Codes Description Comment    7/17/2024  5:22 PM Fawad Cormier [R42] Dizziness     7/17/2024  5:22 PM Fawad Cormier [I10] Uncontrolled hypertension     7/17/2024  5:22 PM Fawad Cormier [J32.9] Sinusitis     7/17/2024  5:22 PM Fawad Cormier [T88.7XXA] Medication side effect           ED Disposition       ED Disposition   Discharge    Condition   Stable    Date/Time   Wed Jul 17, 2024 1721    Comment   Bea Perez discharge to home/self care.                   Follow-up Information       Follow up With Specialties Details Why Contact Info    Joyce Mcpherson MD  Schedule an appointment as soon as possible for a visit in 3 days  [address] [facility] [id number]  [phone]              Patient's Medications   Discharge Prescriptions    No medications on file       No discharge procedures on file.    PDMP Review       None            ED Provider  Electronically Signed by             Fawad Cormier DO  07/17/24 1728

## 2024-07-18 LAB
ATRIAL RATE: 92 BPM
P AXIS: 74 DEGREES
PR INTERVAL: 170 MS
QRS AXIS: -39 DEGREES
QRSD INTERVAL: 100 MS
QT INTERVAL: 364 MS
QTC INTERVAL: 450 MS
T WAVE AXIS: 77 DEGREES
VENTRICULAR RATE: 92 BPM

## 2024-07-18 PROCEDURE — 93010 ELECTROCARDIOGRAM REPORT: CPT | Performed by: INTERNAL MEDICINE

## 2024-08-02 ENCOUNTER — HOSPITAL ENCOUNTER (EMERGENCY)
Facility: HOSPITAL | Age: 74
Discharge: HOME | End: 2024-08-02
Attending: EMERGENCY MEDICINE
Payer: COMMERCIAL

## 2024-08-02 VITALS
HEART RATE: 86 BPM | TEMPERATURE: 98.2 F | DIASTOLIC BLOOD PRESSURE: 65 MMHG | OXYGEN SATURATION: 97 % | RESPIRATION RATE: 20 BRPM | WEIGHT: 123.24 LBS | SYSTOLIC BLOOD PRESSURE: 153 MMHG | BODY MASS INDEX: 21.83 KG/M2

## 2024-08-02 DIAGNOSIS — H92.01 RIGHT EAR PAIN: ICD-10-CM

## 2024-08-02 DIAGNOSIS — R42 DIZZINESS: ICD-10-CM

## 2024-08-02 DIAGNOSIS — R51.9 SINUS HEADACHE: ICD-10-CM

## 2024-08-02 DIAGNOSIS — U07.1 COVID-19: Primary | ICD-10-CM

## 2024-08-02 LAB
ALBUMIN SERPL BCG-MCNC: 4.2 G/DL (ref 3.5–5)
ALP SERPL-CCNC: 82 U/L (ref 34–104)
ALT SERPL W P-5'-P-CCNC: 8 U/L (ref 7–52)
ANION GAP SERPL CALCULATED.3IONS-SCNC: 9 MMOL/L (ref 4–13)
AST SERPL W P-5'-P-CCNC: 17 U/L (ref 13–39)
ATRIAL RATE: 79 BPM
BASOPHILS # BLD AUTO: 0.06 THOUSANDS/ÂΜL (ref 0–0.1)
BASOPHILS NFR BLD AUTO: 1 % (ref 0–1)
BILIRUB SERPL-MCNC: 0.62 MG/DL (ref 0.2–1)
BUN SERPL-MCNC: 19 MG/DL (ref 5–25)
CALCIUM SERPL-MCNC: 9.9 MG/DL (ref 8.4–10.2)
CARDIAC TROPONIN I PNL SERPL HS: 3 NG/L
CHLORIDE SERPL-SCNC: 104 MMOL/L (ref 96–108)
CO2 SERPL-SCNC: 26 MMOL/L (ref 21–32)
CREAT SERPL-MCNC: 0.77 MG/DL (ref 0.6–1.3)
EOSINOPHIL # BLD AUTO: 0.06 THOUSAND/ÂΜL (ref 0–0.61)
EOSINOPHIL NFR BLD AUTO: 1 % (ref 0–6)
ERYTHROCYTE [DISTWIDTH] IN BLOOD BY AUTOMATED COUNT: 14.7 % (ref 11.6–15.1)
FLUAV RNA RESP QL NAA+PROBE: NEGATIVE
FLUBV RNA RESP QL NAA+PROBE: NEGATIVE
GFR SERPL CREATININE-BSD FRML MDRD: 76 ML/MIN/1.73SQ M
GLUCOSE SERPL-MCNC: 131 MG/DL (ref 65–140)
HCT VFR BLD AUTO: 43.1 % (ref 34.8–46.1)
HGB BLD-MCNC: 13.5 G/DL (ref 11.5–15.4)
IMM GRANULOCYTES # BLD AUTO: 0.04 THOUSAND/UL (ref 0–0.2)
IMM GRANULOCYTES NFR BLD AUTO: 0 % (ref 0–2)
LYMPHOCYTES # BLD AUTO: 1 THOUSANDS/ÂΜL (ref 0.6–4.47)
LYMPHOCYTES NFR BLD AUTO: 10 % (ref 14–44)
MCH RBC QN AUTO: 29 PG (ref 26.8–34.3)
MCHC RBC AUTO-ENTMCNC: 31.3 G/DL (ref 31.4–37.4)
MCV RBC AUTO: 93 FL (ref 82–98)
MONOCYTES # BLD AUTO: 0.72 THOUSAND/ÂΜL (ref 0.17–1.22)
MONOCYTES NFR BLD AUTO: 7 % (ref 4–12)
NEUTROPHILS # BLD AUTO: 7.97 THOUSANDS/ÂΜL (ref 1.85–7.62)
NEUTS SEG NFR BLD AUTO: 81 % (ref 43–75)
NRBC BLD AUTO-RTO: 0 /100 WBCS
P AXIS: 65 DEGREES
PLATELET # BLD AUTO: 426 THOUSANDS/UL (ref 149–390)
PMV BLD AUTO: 9.8 FL (ref 8.9–12.7)
POTASSIUM SERPL-SCNC: 3.8 MMOL/L (ref 3.5–5.3)
PR INTERVAL: 176 MS
PROT SERPL-MCNC: 7.4 G/DL (ref 6.4–8.4)
QRS AXIS: -40 DEGREES
QRSD INTERVAL: 102 MS
QT INTERVAL: 382 MS
QTC INTERVAL: 438 MS
RBC # BLD AUTO: 4.66 MILLION/UL (ref 3.81–5.12)
RSV RNA RESP QL NAA+PROBE: NEGATIVE
SARS-COV-2 RNA RESP QL NAA+PROBE: POSITIVE
SODIUM SERPL-SCNC: 139 MMOL/L (ref 135–147)
T WAVE AXIS: 68 DEGREES
VENTRICULAR RATE: 79 BPM
WBC # BLD AUTO: 9.85 THOUSAND/UL (ref 4.31–10.16)

## 2024-08-02 PROCEDURE — 80053 COMPREHEN METABOLIC PANEL: CPT | Performed by: EMERGENCY MEDICINE

## 2024-08-02 PROCEDURE — 85025 COMPLETE CBC W/AUTO DIFF WBC: CPT | Performed by: EMERGENCY MEDICINE

## 2024-08-02 PROCEDURE — 0241U HB NFCT DS VIR RESP RNA 4 TRGT: CPT | Performed by: EMERGENCY MEDICINE

## 2024-08-02 PROCEDURE — 84484 ASSAY OF TROPONIN QUANT: CPT | Performed by: EMERGENCY MEDICINE

## 2024-08-02 PROCEDURE — 36415 COLL VENOUS BLD VENIPUNCTURE: CPT | Performed by: EMERGENCY MEDICINE

## 2024-08-02 PROCEDURE — 93005 ELECTROCARDIOGRAM TRACING: CPT

## 2024-08-02 PROCEDURE — 93010 ELECTROCARDIOGRAM REPORT: CPT | Performed by: INTERNAL MEDICINE

## 2024-08-02 PROCEDURE — 96375 TX/PRO/DX INJ NEW DRUG ADDON: CPT

## 2024-08-02 PROCEDURE — 96365 THER/PROPH/DIAG IV INF INIT: CPT

## 2024-08-02 PROCEDURE — 99284 EMERGENCY DEPT VISIT MOD MDM: CPT

## 2024-08-02 RX ORDER — METOCLOPRAMIDE HYDROCHLORIDE 5 MG/ML
10 INJECTION INTRAMUSCULAR; INTRAVENOUS ONCE
Status: COMPLETED | OUTPATIENT
Start: 2024-08-02 | End: 2024-08-02

## 2024-08-02 RX ORDER — AMOXICILLIN AND CLAVULANATE POTASSIUM 875; 125 MG/1; MG/1
1 TABLET, FILM COATED ORAL EVERY 12 HOURS
Qty: 14 TABLET | Refills: 0 | Status: SHIPPED | OUTPATIENT
Start: 2024-08-02 | End: 2024-08-02

## 2024-08-02 RX ADMIN — SODIUM CHLORIDE, SODIUM LACTATE, POTASSIUM CHLORIDE, AND CALCIUM CHLORIDE 1000 ML: .6; .31; .03; .02 INJECTION, SOLUTION INTRAVENOUS at 07:06

## 2024-08-02 RX ADMIN — METOCLOPRAMIDE 10 MG: 5 INJECTION, SOLUTION INTRAMUSCULAR; INTRAVENOUS at 07:24

## 2024-08-02 NOTE — ED PROVIDER NOTES
History  Chief Complaint   Patient presents with    Dizziness     Pt has been feeling dizzy for the past month. Had a syncopal episode in a chair. Denies falling. Also c/o nausea, blurred vision and anxiety.       History provided by:  Medical records and patient  Dizziness  Quality:  Lightheadedness, room spinning and vertigo  Severity:  Mild  Onset quality:  Gradual  Duration:  2 weeks  Timing:  Intermittent  Progression:  Waxing and waning  Chronicity:  New  Context comment:  Patient has been experiencing some dizziness with coming to a standing position in the morning, sinus pressure, headaches, ringing in the ears, right ear pain  Relieved by:  Nothing  Worsened by:  Nothing  Ineffective treatments: Was placed on amlodipine for her elevated blood pressure and also recently started on meclizine.  Associated symptoms: no chest pain, no diarrhea, no headaches, no nausea, no palpitations, no shortness of breath, no vomiting and no weakness        Prior to Admission Medications   Prescriptions Last Dose Informant Patient Reported? Taking?   acetaminophen (TYLENOL) 325 mg tablet   Yes No   Sig: Take 650 mg by mouth every 6 (six) hours as needed for mild pain   albuterol (ProAir HFA) 90 mcg/act inhaler   No No   Sig: Inhale 2 puffs every 6 (six) hours as needed for wheezing or shortness of breath   amLODIPine (NORVASC) 5 mg tablet   No No   Sig: Take 1 tablet (5 mg total) by mouth daily for 20 days   citalopram (CeleXA) 20 mg tablet   No No   Sig: Take 1 tablet daily   ferrous sulfate 325 (65 Fe) mg tablet   Yes No   Sig: Take 325 mg by mouth daily with breakfast   guaiFENesin (MUCINEX PO)   Yes No   Sig: Take by mouth   Patient not taking: Reported on 2/28/2024   meclizine (ANTIVERT) 25 mg tablet   No No   Sig: Take 1 tablet (25 mg total) by mouth 3 (three) times a day as needed for dizziness   mometasone (ELOCON) 0.1 % cream   No No   Sig: Apply topically daily for 5 days   Patient not taking: Reported on 2/28/2024    omeprazole (PriLOSEC) 20 mg delayed release capsule   No No   Sig: Take 1 capsule (20 mg total) by mouth daily   Patient not taking: Reported on 2/28/2024      Facility-Administered Medications: None       Past Medical History:   Diagnosis Date    Depression     Esophageal stricture     History of gastroesophageal reflux (GERD)     Iron deficiency anemia        Past Surgical History:   Procedure Laterality Date    COLONOSCOPY  2012    GALLBLADDER SURGERY      HYSTERECTOMY      OTHER SURGICAL HISTORY      Endoscopy of stricture 2010, 2012, 5/2014 - Esophageal ulcer; 7/2014 - Esophageal ulcer; 10/2015 (Dr. Jones) - Esophageal stricture; 10/2016; 11/2017 - Stricture       Family History   Problem Relation Age of Onset    Stroke Mother     Hypertension Mother     Emphysema Father      I have reviewed and agree with the history as documented.    E-Cigarette/Vaping    E-Cigarette Use Never User      E-Cigarette/Vaping Substances     Social History     Tobacco Use    Smoking status: Former     Types: Cigarettes    Smokeless tobacco: Never   Vaping Use    Vaping status: Never Used   Substance Use Topics    Alcohol use: Not Currently     Comment: Denies alcohol use - As per [facility]     Drug use: Not Currently       Review of Systems   Constitutional:  Negative for chills, fatigue and fever.   HENT:  Negative for ear discharge, ear pain, rhinorrhea and sore throat.    Eyes:  Negative for pain and visual disturbance.   Respiratory:  Negative for cough and shortness of breath.    Cardiovascular:  Negative for chest pain and palpitations.   Gastrointestinal:  Negative for abdominal pain, diarrhea, nausea and vomiting.   Endocrine: Negative for polydipsia, polyphagia and polyuria.   Genitourinary:  Negative for difficulty urinating, dysuria, flank pain and hematuria.   Musculoskeletal:  Negative for arthralgias and back pain.   Skin:  Negative for color change and rash.   Allergic/Immunologic: Negative for immunocompromised  state.   Neurological:  Positive for dizziness. Negative for seizures, syncope, weakness and headaches.   Psychiatric/Behavioral:  Positive for agitation. Negative for confusion and self-injury. The patient is not nervous/anxious.          states she has had a change in her mood, gets agitated/frustrated more easily   All other systems reviewed and are negative.      Physical Exam  Physical Exam  Vitals and nursing note reviewed.   Constitutional:       General: She is not in acute distress.     Appearance: Normal appearance. She is not ill-appearing, toxic-appearing or diaphoretic.   HENT:      Head: Normocephalic and atraumatic.      Nose: Nose normal. No congestion or rhinorrhea.      Mouth/Throat:      Mouth: Mucous membranes are dry.      Pharynx: Oropharynx is clear. No oropharyngeal exudate or posterior oropharyngeal erythema.      Comments: Mildly dry mucous membranes  Eyes:      General:         Right eye: No discharge.         Left eye: No discharge.   Cardiovascular:      Rate and Rhythm: Normal rate and regular rhythm.      Pulses: Normal pulses.      Heart sounds: Normal heart sounds. No murmur heard.     No gallop.   Pulmonary:      Effort: Pulmonary effort is normal. No respiratory distress.      Breath sounds: Normal breath sounds. No stridor. No wheezing, rhonchi or rales.   Chest:      Chest wall: No tenderness.   Abdominal:      General: Bowel sounds are normal. There is no distension.      Palpations: Abdomen is soft. There is no mass.      Tenderness: There is no abdominal tenderness. There is no right CVA tenderness, left CVA tenderness, guarding or rebound.      Hernia: No hernia is present.   Musculoskeletal:         General: Normal range of motion.      Cervical back: Normal range of motion and neck supple.   Skin:     General: Skin is warm and dry.      Capillary Refill: Capillary refill takes less than 2 seconds.   Neurological:      General: No focal deficit present.      Mental  Status: She is alert and oriented to person, place, and time.      Cranial Nerves: No cranial nerve deficit.      Sensory: No sensory deficit.      Motor: No weakness.      Coordination: Coordination normal.      Gait: Gait normal.      Deep Tendon Reflexes: Reflexes normal.   Psychiatric:         Mood and Affect: Mood normal.         Behavior: Behavior normal.         Thought Content: Thought content normal.         Judgment: Judgment normal.         Vital Signs  ED Triage Vitals [08/02/24 0642]   Temperature Pulse Respirations Blood Pressure SpO2   98.2 °F (36.8 °C) 90 20 144/70 99 %      Temp Source Heart Rate Source Patient Position - Orthostatic VS BP Location FiO2 (%)   Temporal Monitor Lying Left arm --      Pain Score       --           Vitals:    08/02/24 0642 08/02/24 0745   BP: 144/70 153/65   Pulse: 90 86   Patient Position - Orthostatic VS: Lying Sitting         Visual Acuity  Visual Acuity      Flowsheet Row Most Recent Value   L Pupil Size (mm) 3   R Pupil Size (mm) 3            ED Medications  Medications   lactated ringers bolus 1,000 mL (0 mL Intravenous Stopped 8/2/24 0755)   metoclopramide (REGLAN) injection 10 mg (10 mg Intravenous Given 8/2/24 0724)       Diagnostic Studies  Results Reviewed       Procedure Component Value Units Date/Time    FLU/RSV/COVID - if FLU/RSV clinically relevant [[id number]]  (Abnormal) Collected: 08/02/24 0704    Lab Status: Final result Specimen: Nares from Nose Updated: 08/02/24 0749     SARS-CoV-2 Positive     INFLUENZA A PCR Negative     INFLUENZA B PCR Negative     RSV PCR Negative    Narrative:      FOR PEDIATRIC PATIENTS - copy/paste COVID Guidelines URL to browser: [email or web address]    SARS-CoV-2 assay is a Nucleic Acid Amplification assay intended for the  qualitative detection of nucleic acid from SARS-CoV-2 in nasopharyngeal  swabs. Results are for the presumptive identification of SARS-CoV-2  RNA.    Positive results are indicative of infection with SARS-CoV-2, the virus  causing COVID-19, but do not rule out bacterial infection or co-infection  with other viruses. Laboratories within the [place] and its  territories are required to report all positive results to the appropriate  public health authorities. Negative results do not preclude SARS-CoV-2  infection and should not be used as the sole basis for treatment or other  patient management decisions. Negative results must be combined with  clinical observations, patient history, and epidemiological information.  This test has not been FDA cleared or approved.    This test has been authorized by FDA under an Emergency Use Authorization  (EUA). This test is only authorized for the duration of time the  declaration that circumstances exist justifying the authorization of the  emergency use of an in vitro diagnostic tests for detection of SARS-CoV-2  virus and/or diagnosis of COVID-19 infection under section 564(b)(1) of  the Act, 21 U.S.C. 360bbb-3(b)(1), unless the authorization is terminated  or revoked sooner. The test has been validated but independent review by FDA  and CLIA is pending.    Test performed using [employer] GeneXpert: This RT-PCR assay targets N2,  a region unique to SARS-CoV-2. A conserved region in the E-gene was chosen  for pan-Sarbecovirus detection which includes SARS-CoV-2.    According to CMS-2020-01-R, this platform meets the definition of high-throughput technology.    HS Troponin 0hr (reflex protocol) [[id number]]  (Normal) Collected: 08/02/24 0704    Lab Status: Final result Specimen: Blood from Arm, Right Updated: 08/02/24 0740     hs TnI 0hr 3 ng/L     Comprehensive metabolic panel [[id number]] Collected: 08/02/24 0704    Lab Status: Final result Specimen: Blood from Arm, Right Updated: 08/02/24 0739     Sodium 139 mmol/L      Potassium 3.8 mmol/L      Chloride 104 mmol/L      CO2 26 mmol/L      ANION GAP 9 mmol/L       BUN 19 mg/dL      Creatinine 0.77 mg/dL      Glucose 131 mg/dL      Calcium 9.9 mg/dL      AST 17 U/L      ALT 8 U/L      Alkaline Phosphatase 82 U/L      Total Protein 7.4 g/dL      Albumin 4.2 g/dL      Total Bilirubin 0.62 mg/dL      eGFR 76 ml/min/1.73sq m     Narrative:      National Kidney Disease Foundation guidelines for Chronic Kidney Disease (CKD):     Stage 1 with normal or high GFR (GFR > 90 mL/min/1.73 square meters)    Stage 2 Mild CKD (GFR = 60-89 mL/min/1.73 square meters)    Stage 3A Moderate CKD (GFR = 45-59 mL/min/1.73 square meters)    Stage 3B Moderate CKD (GFR = 30-44 mL/min/1.73 square meters)    Stage 4 Severe CKD (GFR = 15-29 mL/min/1.73 square meters)    Stage 5 End Stage CKD (GFR <15 mL/min/1.73 square meters)  Note: GFR calculation is accurate only with a steady state creatinine    CBC and differential [[id number]]  (Abnormal) Collected: 08/02/24 0704    Lab Status: Final result Specimen: Blood from Arm, Right Updated: 08/02/24 0734     WBC 9.85 Thousand/uL      RBC 4.66 Million/uL      Hemoglobin 13.5 g/dL      Hematocrit 43.1 %      MCV 93 fL      MCH 29.0 pg      MCHC 31.3 g/dL      RDW 14.7 %      MPV 9.8 fL      Platelets 426 Thousands/uL      nRBC 0 /100 WBCs      Segmented % 81 %      Immature Grans % 0 %      Lymphocytes % 10 %      Monocytes % 7 %      Eosinophils Relative 1 %      Basophils Relative 1 %      Absolute Neutrophils 7.97 Thousands/µL      Absolute Immature Grans 0.04 Thousand/uL      Absolute Lymphocytes 1.00 Thousands/µL      Absolute Monocytes 0.72 Thousand/µL      Eosinophils Absolute 0.06 Thousand/µL      Basophils Absolute 0.06 Thousands/µL                    No orders to display              Procedures  Procedures         ED Course                                 SBIRT 20yo+      Flowsheet Row Most Recent Value   Initial Alcohol Screen: US AUDIT-C     1. How often do you have a drink containing alcohol? 0 Filed at: 08/02/2024 0644   2. How many drinks  containing alcohol do you have on a typical day you are drinking?  0 Filed at: 08/02/2024 0644   3b. FEMALE Any Age, or MALE 65+: How often do you have 4 or more drinks on one occassion? 0 Filed at: 08/02/2024 0644   Audit-C Score 0 Filed at: 08/02/2024 0644   SARAH: How many times in the past year have you...    Used an illegal drug or used a prescription medication for non-medical reasons? Never Filed at: 08/02/2024 0644                      Medical Decision Making  0654: Patient appears well, vital signs reviewed.  Patient has numerous complaints of sinus pressure, tinnitus, poor appetite, dizzy feeling.  Patient has been seen in our emergency room on several occasions for similar, recent CTA head and neck reviewed.  Patient has follow-up with Dr. Davis in 5 days for further evaluation.  Normal neurological exam.  NIHSS 0.  Plan to repeat basic labs, send COVID/flu/RSV PCR.  Patient appears mildly dehydrated on exam.  Plan to rehydrate with IV fluids.    0800: Labs reviewed.  COVID-positive.  Symptomatic care instructed.    Amount and/or Complexity of Data Reviewed  External Data Reviewed: labs, radiology, ECG and notes.     Details: CTA head and neck 7/13/2024--CT Brain: Mild chronic microangiopathic changes are noted. Chronic lacunar infarct right frontal deep white matter. Left mastoid air cell opacification.     CT Angiography:  Unremarkable CTA neck and brain.    Labs: ordered.  ECG/medicine tests: ordered and independent interpretation performed.     Details: Normal sinus rhythm 79 bpm left axis deviation, minimal voltage criteria for LVH, no acute ischemia.    Risk  Prescription drug management.                 Disposition  Final diagnoses:   Sinus headache   Dizziness   Right ear pain   COVID-19     Time reflects when diagnosis was documented in both MDM as applicable and the Disposition within this note       Time User Action Codes Description Comment    8/2/2024  7:41 AM Iván Rucker Add [R51.9] Sinus  headache     8/2/2024  7:42 AM Iván Rucker Add [R42] Dizziness     8/2/2024  7:42 AM Iván Rucker Add [H92.01] Right ear pain     8/2/2024  7:50 AM Iván Rucker Add [U07.1] COVID-19     8/2/2024  7:50 AM Iván Rucker Modify [R51.9] Sinus headache     8/2/2024  7:50 AM Iván Rucker Modify [U07.1] COVID-19           ED Disposition       ED Disposition   Discharge    Condition   Stable    Date/Time   Fri Aug 2, 2024 0741    Comment   Bea Perez discharge to home/self care.                   Follow-up Information       Follow up With Specialties Details Why Contact Info    Alexis Davis MD Otolaryngology Schedule an appointment as soon as possible for a visit in 5 days as scheduled [address] [place] [address]  [facility] [id number]  [phone]              Discharge Medication List as of 8/2/2024  7:50 AM        CONTINUE these medications which have NOT CHANGED    Details   acetaminophen (TYLENOL) 325 mg tablet Take 650 mg by mouth every 6 (six) hours as needed for mild pain, Historical Med      albuterol (ProAir HFA) 90 mcg/act inhaler Inhale 2 puffs every 6 (six) hours as needed for wheezing or shortness of breath, Starting Wed 2/28/2024, Normal      amLODIPine (NORVASC) 5 mg tablet Take 1 tablet (5 mg total) by mouth daily for 20 days, Starting Sat 7/13/2024, Until Fri 8/2/2024, Normal      citalopram (CeleXA) 20 mg tablet Take 1 tablet daily, Normal      ferrous sulfate 325 (65 Fe) mg tablet Take 325 mg by mouth daily with breakfast, Historical Med      guaiFENesin (MUCINEX PO) Take by mouth, Historical Med      meclizine (ANTIVERT) 25 mg tablet Take 1 tablet (25 mg total) by mouth 3 (three) times a day as needed for dizziness, Starting Sat 7/13/2024, Normal      mometasone (ELOCON) 0.1 % cream Apply topically daily for 5 days, Starting Sun 7/22/2018, Until Wed 1/31/2024, Normal      omeprazole (PriLOSEC) 20 mg delayed release capsule Take 1 capsule (20 mg total) by mouth daily,  Starting Mon 5/24/2021, Normal             No discharge procedures on file.    PDMP Review       None            ED Provider  Electronically Signed by             Iván Rucker MD  08/02/24 5178

## 2024-08-28 ENCOUNTER — HOSPITAL ENCOUNTER (EMERGENCY)
Facility: HOSPITAL | Age: 74
Discharge: HOME | End: 2024-08-28
Attending: EMERGENCY MEDICINE
Payer: COMMERCIAL

## 2024-08-28 ENCOUNTER — APPOINTMENT (EMERGENCY)
Dept: CT IMAGING | Facility: HOSPITAL | Age: 74
End: 2024-08-28
Payer: COMMERCIAL

## 2024-08-28 VITALS
WEIGHT: 124 LBS | HEIGHT: 63 IN | OXYGEN SATURATION: 96 % | RESPIRATION RATE: 18 BRPM | DIASTOLIC BLOOD PRESSURE: 78 MMHG | SYSTOLIC BLOOD PRESSURE: 135 MMHG | BODY MASS INDEX: 21.97 KG/M2 | TEMPERATURE: 96.8 F | HEART RATE: 86 BPM

## 2024-08-28 DIAGNOSIS — F41.9 ANXIETY: ICD-10-CM

## 2024-08-28 DIAGNOSIS — R42 DIZZINESS: Primary | ICD-10-CM

## 2024-08-28 DIAGNOSIS — G47.00 INSOMNIA: ICD-10-CM

## 2024-08-28 LAB
ALBUMIN SERPL BCG-MCNC: 4 G/DL (ref 3.5–5)
ALP SERPL-CCNC: 81 U/L (ref 34–104)
ALT SERPL W P-5'-P-CCNC: 10 U/L (ref 7–52)
ANION GAP SERPL CALCULATED.3IONS-SCNC: 7 MMOL/L (ref 4–13)
APTT PPP: 22 SECONDS (ref 23–34)
AST SERPL W P-5'-P-CCNC: 13 U/L (ref 13–39)
ATRIAL RATE: 108 BPM
BASOPHILS # BLD AUTO: 0.07 THOUSANDS/ÂΜL (ref 0–0.1)
BASOPHILS NFR BLD AUTO: 1 % (ref 0–1)
BILIRUB SERPL-MCNC: 0.39 MG/DL (ref 0.2–1)
BNP SERPL-MCNC: 38 PG/ML (ref 0–100)
BUN SERPL-MCNC: 13 MG/DL (ref 5–25)
CALCIUM SERPL-MCNC: 10.4 MG/DL (ref 8.4–10.2)
CARDIAC TROPONIN I PNL SERPL HS: 5 NG/L
CHLORIDE SERPL-SCNC: 105 MMOL/L (ref 96–108)
CO2 SERPL-SCNC: 26 MMOL/L (ref 21–32)
CREAT SERPL-MCNC: 0.7 MG/DL (ref 0.6–1.3)
EOSINOPHIL # BLD AUTO: 0.08 THOUSAND/ÂΜL (ref 0–0.61)
EOSINOPHIL NFR BLD AUTO: 1 % (ref 0–6)
ERYTHROCYTE [DISTWIDTH] IN BLOOD BY AUTOMATED COUNT: 14.4 % (ref 11.6–15.1)
GFR SERPL CREATININE-BSD FRML MDRD: 86 ML/MIN/1.73SQ M
GLUCOSE SERPL-MCNC: 108 MG/DL (ref 65–140)
GLUCOSE SERPL-MCNC: 122 MG/DL (ref 65–140)
HCT VFR BLD AUTO: 43 % (ref 34.8–46.1)
HGB BLD-MCNC: 13.6 G/DL (ref 11.5–15.4)
IMM GRANULOCYTES # BLD AUTO: 0.03 THOUSAND/UL (ref 0–0.2)
IMM GRANULOCYTES NFR BLD AUTO: 0 % (ref 0–2)
INR PPP: 0.91 (ref 0.85–1.19)
LYMPHOCYTES # BLD AUTO: 1.54 THOUSANDS/ÂΜL (ref 0.6–4.47)
LYMPHOCYTES NFR BLD AUTO: 17 % (ref 14–44)
MAGNESIUM SERPL-MCNC: 2.1 MG/DL (ref 1.9–2.7)
MCH RBC QN AUTO: 29.1 PG (ref 26.8–34.3)
MCHC RBC AUTO-ENTMCNC: 31.6 G/DL (ref 31.4–37.4)
MCV RBC AUTO: 92 FL (ref 82–98)
MONOCYTES # BLD AUTO: 0.76 THOUSAND/ÂΜL (ref 0.17–1.22)
MONOCYTES NFR BLD AUTO: 8 % (ref 4–12)
NEUTROPHILS # BLD AUTO: 6.68 THOUSANDS/ÂΜL (ref 1.85–7.62)
NEUTS SEG NFR BLD AUTO: 73 % (ref 43–75)
NRBC BLD AUTO-RTO: 0 /100 WBCS
P AXIS: 76 DEGREES
PLATELET # BLD AUTO: 453 THOUSANDS/UL (ref 149–390)
PMV BLD AUTO: 8.9 FL (ref 8.9–12.7)
POTASSIUM SERPL-SCNC: 4 MMOL/L (ref 3.5–5.3)
PR INTERVAL: 162 MS
PROT SERPL-MCNC: 7.4 G/DL (ref 6.4–8.4)
PROTHROMBIN TIME: 12.6 SECONDS (ref 12.3–15)
QRS AXIS: -33 DEGREES
QRSD INTERVAL: 92 MS
QT INTERVAL: 338 MS
QTC INTERVAL: 452 MS
RBC # BLD AUTO: 4.67 MILLION/UL (ref 3.81–5.12)
SODIUM SERPL-SCNC: 138 MMOL/L (ref 135–147)
T WAVE AXIS: 66 DEGREES
VENTRICULAR RATE: 108 BPM
WBC # BLD AUTO: 9.16 THOUSAND/UL (ref 4.31–10.16)

## 2024-08-28 PROCEDURE — 96374 THER/PROPH/DIAG INJ IV PUSH: CPT

## 2024-08-28 PROCEDURE — 84484 ASSAY OF TROPONIN QUANT: CPT | Performed by: EMERGENCY MEDICINE

## 2024-08-28 PROCEDURE — 96361 HYDRATE IV INFUSION ADD-ON: CPT

## 2024-08-28 PROCEDURE — 99285 EMERGENCY DEPT VISIT HI MDM: CPT | Performed by: EMERGENCY MEDICINE

## 2024-08-28 PROCEDURE — 36415 COLL VENOUS BLD VENIPUNCTURE: CPT | Performed by: EMERGENCY MEDICINE

## 2024-08-28 PROCEDURE — 85730 THROMBOPLASTIN TIME PARTIAL: CPT | Performed by: EMERGENCY MEDICINE

## 2024-08-28 PROCEDURE — 82948 REAGENT STRIP/BLOOD GLUCOSE: CPT

## 2024-08-28 PROCEDURE — 93005 ELECTROCARDIOGRAM TRACING: CPT

## 2024-08-28 PROCEDURE — 80053 COMPREHEN METABOLIC PANEL: CPT | Performed by: EMERGENCY MEDICINE

## 2024-08-28 PROCEDURE — 99284 EMERGENCY DEPT VISIT MOD MDM: CPT

## 2024-08-28 PROCEDURE — 93010 ELECTROCARDIOGRAM REPORT: CPT | Performed by: INTERNAL MEDICINE

## 2024-08-28 PROCEDURE — 70450 CT HEAD/BRAIN W/O DYE: CPT

## 2024-08-28 PROCEDURE — 83735 ASSAY OF MAGNESIUM: CPT | Performed by: EMERGENCY MEDICINE

## 2024-08-28 PROCEDURE — 85025 COMPLETE CBC W/AUTO DIFF WBC: CPT | Performed by: EMERGENCY MEDICINE

## 2024-08-28 PROCEDURE — 83880 ASSAY OF NATRIURETIC PEPTIDE: CPT | Performed by: EMERGENCY MEDICINE

## 2024-08-28 PROCEDURE — 85610 PROTHROMBIN TIME: CPT | Performed by: EMERGENCY MEDICINE

## 2024-08-28 RX ORDER — LORAZEPAM 0.5 MG/1
0.5 TABLET ORAL 3 TIMES DAILY PRN
Qty: 15 TABLET | Refills: 0 | Status: SHIPPED | OUTPATIENT
Start: 2024-08-28 | End: 2024-09-07

## 2024-08-28 RX ORDER — DIAZEPAM 10 MG/2ML
5 INJECTION, SOLUTION INTRAMUSCULAR; INTRAVENOUS ONCE
Status: COMPLETED | OUTPATIENT
Start: 2024-08-28 | End: 2024-08-28

## 2024-08-28 RX ADMIN — SODIUM CHLORIDE 1000 ML: 0.9 INJECTION, SOLUTION INTRAVENOUS at 02:54

## 2024-08-28 RX ADMIN — DIAZEPAM 5 MG: 5 INJECTION INTRAMUSCULAR; INTRAVENOUS at 02:54

## 2024-08-28 NOTE — ED PROVIDER NOTES
History  Chief Complaint   Patient presents with    Dizziness     Per pt she has had ongoing dizziness and diagnosed with meniere disease. + blurry vision. Speech is clear. Pt verbalized she is unable to sleep.      Patient is a 73-year-old female presents emergency department complaining of difficulty sleeping and increased anxiety and dizziness symptoms have been ongoing for several weeks was diagnosed with Ménière's disease.  No headache no focal numbness or weakness or strokelike symptoms.  Patient reports difficulty sleeping for the past few days and this morning being very anxious and shaky all over.        History provided by:  Patient  Dizziness  Associated symptoms: no chest pain, no headaches, no nausea, no shortness of breath, no vomiting and no weakness        Prior to Admission Medications   Prescriptions Last Dose Informant Patient Reported? Taking?   acetaminophen (TYLENOL) 325 mg tablet   Yes No   Sig: Take 650 mg by mouth every 6 (six) hours as needed for mild pain   albuterol (ProAir HFA) 90 mcg/act inhaler   No No   Sig: Inhale 2 puffs every 6 (six) hours as needed for wheezing or shortness of breath   amLODIPine (NORVASC) 5 mg tablet   No No   Sig: Take 1 tablet (5 mg total) by mouth daily for 20 days   citalopram (CeleXA) 20 mg tablet   No No   Sig: Take 1 tablet daily   ferrous sulfate 325 (65 Fe) mg tablet   Yes No   Sig: Take 325 mg by mouth daily with breakfast   guaiFENesin (MUCINEX PO)   Yes No   Sig: Take by mouth   Patient not taking: Reported on 2/28/2024   meclizine (ANTIVERT) 25 mg tablet   No No   Sig: Take 1 tablet (25 mg total) by mouth 3 (three) times a day as needed for dizziness   mometasone (ELOCON) 0.1 % cream   No No   Sig: Apply topically daily for 5 days   Patient not taking: Reported on 2/28/2024   omeprazole (PriLOSEC) 20 mg delayed release capsule   No No   Sig: Take 1 capsule (20 mg total) by mouth daily   Patient not taking: Reported on 2/28/2024       Facility-Administered Medications: None       Past Medical History:   Diagnosis Date    Depression     Esophageal stricture     History of gastroesophageal reflux (GERD)     Iron deficiency anemia     Meniere disease        Past Surgical History:   Procedure Laterality Date    COLONOSCOPY  2012    GALLBLADDER SURGERY      HYSTERECTOMY      OTHER SURGICAL HISTORY      Endoscopy of stricture 2010, 2012, 5/2014 - Esophageal ulcer; 7/2014 - Esophageal ulcer; 10/2015 (Dr. Jones) - Esophageal stricture; 10/2016; 11/2017 - Stricture       Family History   Problem Relation Age of Onset    Stroke Mother     Hypertension Mother     Emphysema Father      I have reviewed and agree with the history as documented.    E-Cigarette/Vaping    E-Cigarette Use Never User      E-Cigarette/Vaping Substances     Social History     Tobacco Use    Smoking status: Some Days     Types: Cigarettes    Smokeless tobacco: Never   Vaping Use    Vaping status: Never Used   Substance Use Topics    Alcohol use: Not Currently     Comment: Denies alcohol use - As per [facility]     Drug use: Not Currently       Review of Systems   Constitutional:  Negative for fever.   Eyes:  Positive for visual disturbance.   Respiratory:  Negative for shortness of breath.    Cardiovascular:  Negative for chest pain.   Gastrointestinal:  Negative for abdominal pain, nausea and vomiting.   Neurological:  Positive for dizziness and light-headedness. Negative for facial asymmetry, speech difficulty, weakness, numbness and headaches.   Psychiatric/Behavioral:  The patient is nervous/anxious.    All other systems reviewed and are negative.      Physical Exam  Physical Exam  Vitals and nursing note reviewed.   Constitutional:       General: She is not in acute distress.     Appearance: Normal appearance.   HENT:      Head: Normocephalic and atraumatic.      Nose: Nose normal.   Eyes:      General: No visual field deficit.     Conjunctiva/sclera: Conjunctivae normal.    Pulmonary:      Effort: Pulmonary effort is normal. No respiratory distress.   Skin:     General: Skin is dry.   Neurological:      General: No focal deficit present.      Mental Status: She is alert and oriented to person, place, and time.      Cranial Nerves: No dysarthria or facial asymmetry.      Sensory: Sensation is intact.      Motor: Motor function is intact. No weakness or pronator drift.   Psychiatric:         Mood and Affect: Mood is anxious.         Vital Signs  ED Triage Vitals [08/28/24 0248]   Temperature Pulse Respirations Blood Pressure SpO2   (!) 96.8 °F (36 °C) 104 16 140/75 97 %      Temp Source Heart Rate Source Patient Position - Orthostatic VS BP Location FiO2 (%)   Temporal Monitor -- Right arm --      Pain Score       No Pain           Vitals:    08/28/24 0248   BP: 140/75   Pulse: 104         Visual Acuity  Visual Acuity      Flowsheet Row Most Recent Value   L Pupil Size (mm) 3   R Pupil Size (mm) 3            ED Medications  Medications   sodium chloride 0.9 % bolus 1,000 mL (1,000 mL Intravenous New Bag 8/28/24 0254)   diazepam (VALIUM) injection 5 mg (5 mg Intravenous Given 8/28/24 0254)       Diagnostic Studies  Results Reviewed       Procedure Component Value Units Date/Time    Comprehensive metabolic panel [[id number]]  (Abnormal) Collected: 08/28/24 0253    Lab Status: Final result Specimen: Blood from Arm, Left Updated: 08/28/24 0328     Sodium 138 mmol/L      Potassium 4.0 mmol/L      Chloride 105 mmol/L      CO2 26 mmol/L      ANION GAP 7 mmol/L      BUN 13 mg/dL      Creatinine 0.70 mg/dL      Glucose 108 mg/dL      Calcium 10.4 mg/dL      AST 13 U/L      ALT 10 U/L      Alkaline Phosphatase 81 U/L      Total Protein 7.4 g/dL      Albumin 4.0 g/dL      Total Bilirubin 0.39 mg/dL      eGFR 86 ml/min/1.73sq m     Narrative:      [employer] guidelines for Chronic Kidney Disease (CKD):     Stage 1 with normal or high GFR (GFR > 90 mL/min/1.73 square  meters)    Stage 2 Mild CKD (GFR = 60-89 mL/min/1.73 square meters)    Stage 3A Moderate CKD (GFR = 45-59 mL/min/1.73 square meters)    Stage 3B Moderate CKD (GFR = 30-44 mL/min/1.73 square meters)    Stage 4 Severe CKD (GFR = 15-29 mL/min/1.73 square meters)    Stage 5 End Stage CKD (GFR <15 mL/min/1.73 square meters)  Note: GFR calculation is accurate only with a steady state creatinine    Magnesium [[id number]]  (Normal) Collected: 08/28/24 0253    Lab Status: Final result Specimen: Blood from Arm, Left Updated: 08/28/24 0328     Magnesium 2.1 mg/dL     HS Troponin 0hr (reflex protocol) [[id number]]  (Normal) Collected: 08/28/24 0253    Lab Status: Final result Specimen: Blood from Arm, Left Updated: 08/28/24 0324     hs TnI 0hr 5 ng/L     B-Type Natriuretic Peptide(BNP) [[id number]]  (Normal) Collected: 08/28/24 0253    Lab Status: Final result Specimen: Blood from Arm, Left Updated: 08/28/24 0324     BNP 38 pg/mL     Protime-INR [[id number]]  (Normal) Collected: 08/28/24 0253    Lab Status: Final result Specimen: Blood from Arm, Left Updated: 08/28/24 0317     Protime 12.6 seconds      INR 0.91    Narrative:      INR Therapeutic Range    Indication                                             INR Range      Atrial Fibrillation                                               2.0-3.0  Hypercoagulable State                                    2.0.2.3  Left Ventricular Asist Device                            2.0-3.0  Mechanical Heart Valve                                  -    Aortic(with afib, MI, embolism, HF, LA enlargement,    and/or coagulopathy)                                     2.0-3.0 (2.5-3.5)     Mitral                                                             2.5-3.5  Prosthetic/Bioprosthetic Heart Valve               2.0-3.0  Venous thromboembolism (VTE: VT, PE        2.0-3.0    APTT [[id number]]  (Abnormal) Collected: 08/28/24 0253    Lab Status: Final result Specimen: Blood from Arm, Left Updated: 08/28/24  0317     PTT 22 seconds     CBC and differential [[id number]]  (Abnormal) Collected: 08/28/24 0253    Lab Status: Final result Specimen: Blood from Arm, Left Updated: 08/28/24 0300     WBC 9.16 Thousand/uL      RBC 4.67 Million/uL      Hemoglobin 13.6 g/dL      Hematocrit 43.0 %      MCV 92 fL      MCH 29.1 pg      MCHC 31.6 g/dL      RDW 14.4 %      MPV 8.9 fL      Platelets 453 Thousands/uL      nRBC 0 /100 WBCs      Segmented % 73 %      Immature Grans % 0 %      Lymphocytes % 17 %      Monocytes % 8 %      Eosinophils Relative 1 %      Basophils Relative 1 %      Absolute Neutrophils 6.68 Thousands/µL      Absolute Immature Grans 0.03 Thousand/uL      Absolute Lymphocytes 1.54 Thousands/µL      Absolute Monocytes 0.76 Thousand/µL      Eosinophils Absolute 0.08 Thousand/µL      Basophils Absolute 0.07 Thousands/µL     Fingerstick Glucose (POCT) [[id number]]  (Normal) Collected: 08/28/24 0245    Lab Status: Final result Specimen: Blood Updated: 08/28/24 0246     POC Glucose 122 mg/dl                    CT head without contrast   Final Result by Homero Tavares DO (08/28 0324)      No acute intracranial abnormality.                  Workstation performed: [id number]                    Procedures  ECG 12 Lead Documentation Only    Date/Time: 8/28/2024 2:51 AM    Performed by: Kevin Sorto DO  Authorized by: Kevin Sorto DO    ECG reviewed by me, the ED Provider: yes    Patient location:  ED  Previous ECG:     Comparison to cardiac monitor: Yes    Rate:     ECG rate assessment: tachycardic    Rhythm:     Rhythm: sinus tachycardia    QRS:     QRS axis:  Left    QRS intervals:  Normal  Conduction:     Conduction: normal    T waves:     T waves: normal             ED Course                                 SBIRT 22yo+      Flowsheet Row Most Recent Value   Initial Alcohol Screen: US AUDIT-C     1. How often do you have a drink containing alcohol? 0 Filed at: 08/28/2024 0302   2. How many drinks containing alcohol do  you have on a typical day you are drinking?  0 Filed at: 08/28/2024 0302   3a. Male UNDER 65: How often do you have five or more drinks on one occasion? 0 Filed at: 08/28/2024 0302   3b. FEMALE Any Age, or MALE 65+: How often do you have 4 or more drinks on one occassion? 0 Filed at: 08/28/2024 0302   Audit-C Score 0 Filed at: 08/28/2024 0302   SARAH: How many times in the past year have you...    Used an illegal drug or used a prescription medication for non-medical reasons? Never Filed at: 08/28/2024 0302                      Medical Decision Making  Differential diagnosis included but not limited to intracranial hemorrhage stroke arrhythmia electrolyte derangement anxiety hyperventilation.  Patient remained clinically and hemodynamically stable in the emergency department normal nonfocal neurologic exam in the ED workup in the ED reveals no evidence of acute intracranial pathology patient felt improved with rest and anxiolysis in the ED. for now recommended rest and supportive care and prompt follow-up with primary physician for further evaluation and treatment obtain test results. return precautions and anticipatory as discussed.      Problems Addressed:  Anxiety: acute illness or injury  Dizziness: acute illness or injury  Insomnia: acute illness or injury    Amount and/or Complexity of Data Reviewed  Labs: ordered. Decision-making details documented in ED Course.  Radiology: ordered. Decision-making details documented in ED Course.  ECG/medicine tests: ordered and independent interpretation performed. Decision-making details documented in ED Course.    Risk  Prescription drug management.                 Disposition  Final diagnoses:   Dizziness   Anxiety   Insomnia     Time reflects when diagnosis was documented in both MDM as applicable and the Disposition within this note       Time User Action Codes Description Comment    8/28/2024  3:25 AM Kevin Sorto Add [R42] Dizziness     8/28/2024  3:25 AM Noreen  Kevin Becerril [F41.9] Anxiety     8/28/2024  3:25 AM Kevin Sorto [G47.00] Insomnia           ED Disposition       ED Disposition   Discharge    Condition   Stable    Date/Time   Wed Aug 28, 2024 0325    Comment   Bea Perez discharge to home/self care.                   Follow-up Information       Follow up With Specialties Details Why Contact Info    Joyce Mcpherson MD  Schedule an appointment as soon as possible for a visit in 2 days  [address] [facility] [address]  [phone]              Patient's Medications   Discharge Prescriptions    No medications on file       No discharge procedures on file.    PDMP Review       None            ED Provider  Electronically Signed by             Kevin Sorto DO  08/28/24 0329

## 2024-08-29 ENCOUNTER — HOSPITAL ENCOUNTER (EMERGENCY)
Facility: HOSPITAL | Age: 74
Discharge: HOME | End: 2024-08-29
Attending: EMERGENCY MEDICINE
Payer: COMMERCIAL

## 2024-08-29 VITALS
BODY MASS INDEX: 23.09 KG/M2 | WEIGHT: 130.29 LBS | DIASTOLIC BLOOD PRESSURE: 68 MMHG | RESPIRATION RATE: 16 BRPM | TEMPERATURE: 97.8 F | OXYGEN SATURATION: 96 % | HEIGHT: 63 IN | SYSTOLIC BLOOD PRESSURE: 145 MMHG | HEART RATE: 88 BPM

## 2024-08-29 DIAGNOSIS — R41.0 DELIRIUM: ICD-10-CM

## 2024-08-29 DIAGNOSIS — R42 DIZZINESS: Primary | ICD-10-CM

## 2024-08-29 DIAGNOSIS — G47.00 INSOMNIA: ICD-10-CM

## 2024-08-29 LAB
25(OH)D3 SERPL-MCNC: 44.8 NG/ML (ref 30–100)
ALBUMIN SERPL BCG-MCNC: 3.9 G/DL (ref 3.5–5)
ALP SERPL-CCNC: 76 U/L (ref 34–104)
ALT SERPL W P-5'-P-CCNC: 13 U/L (ref 7–52)
AMORPH URATE CRY URNS QL MICRO: NORMAL /HPF
ANION GAP SERPL CALCULATED.3IONS-SCNC: 7 MMOL/L (ref 4–13)
AST SERPL W P-5'-P-CCNC: 22 U/L (ref 13–39)
ATRIAL RATE: 99 BPM
BACTERIA UR QL AUTO: NORMAL /HPF
BASOPHILS # BLD AUTO: 0.07 THOUSANDS/ÂΜL (ref 0–0.1)
BASOPHILS NFR BLD AUTO: 1 % (ref 0–1)
BILIRUB SERPL-MCNC: 0.45 MG/DL (ref 0.2–1)
BILIRUB UR QL STRIP: NEGATIVE
BUN SERPL-MCNC: 13 MG/DL (ref 5–25)
CALCIUM SERPL-MCNC: 10.1 MG/DL (ref 8.4–10.2)
CHLORIDE SERPL-SCNC: 106 MMOL/L (ref 96–108)
CLARITY UR: CLEAR
CO2 SERPL-SCNC: 27 MMOL/L (ref 21–32)
COLOR UR: YELLOW
CREAT SERPL-MCNC: 0.62 MG/DL (ref 0.6–1.3)
EOSINOPHIL # BLD AUTO: 0.02 THOUSAND/ÂΜL (ref 0–0.61)
EOSINOPHIL NFR BLD AUTO: 0 % (ref 0–6)
ERYTHROCYTE [DISTWIDTH] IN BLOOD BY AUTOMATED COUNT: 14.5 % (ref 11.6–15.1)
GFR SERPL CREATININE-BSD FRML MDRD: 89 ML/MIN/1.73SQ M
GLUCOSE SERPL-MCNC: 108 MG/DL (ref 65–140)
GLUCOSE UR STRIP-MCNC: NEGATIVE MG/DL
HCT VFR BLD AUTO: 43.7 % (ref 34.8–46.1)
HGB BLD-MCNC: 13.9 G/DL (ref 11.5–15.4)
HGB UR QL STRIP.AUTO: ABNORMAL
IMM GRANULOCYTES # BLD AUTO: 0.02 THOUSAND/UL (ref 0–0.2)
IMM GRANULOCYTES NFR BLD AUTO: 0 % (ref 0–2)
KETONES UR STRIP-MCNC: NEGATIVE MG/DL
LEUKOCYTE ESTERASE UR QL STRIP: NEGATIVE
LYMPHOCYTES # BLD AUTO: 1.17 THOUSANDS/ÂΜL (ref 0.6–4.47)
LYMPHOCYTES NFR BLD AUTO: 15 % (ref 14–44)
MCH RBC QN AUTO: 29.3 PG (ref 26.8–34.3)
MCHC RBC AUTO-ENTMCNC: 31.8 G/DL (ref 31.4–37.4)
MCV RBC AUTO: 92 FL (ref 82–98)
MONOCYTES # BLD AUTO: 0.55 THOUSAND/ÂΜL (ref 0.17–1.22)
MONOCYTES NFR BLD AUTO: 7 % (ref 4–12)
NEUTROPHILS # BLD AUTO: 5.86 THOUSANDS/ÂΜL (ref 1.85–7.62)
NEUTS SEG NFR BLD AUTO: 77 % (ref 43–75)
NITRITE UR QL STRIP: NEGATIVE
NON-SQ EPI CELLS URNS QL MICRO: NORMAL /HPF
NRBC BLD AUTO-RTO: 0 /100 WBCS
P AXIS: 63 DEGREES
PH UR STRIP.AUTO: 7 [PH]
PLATELET # BLD AUTO: 453 THOUSANDS/UL (ref 149–390)
PMV BLD AUTO: 9.1 FL (ref 8.9–12.7)
POTASSIUM SERPL-SCNC: 4.9 MMOL/L (ref 3.5–5.3)
PR INTERVAL: 164 MS
PROT SERPL-MCNC: 7 G/DL (ref 6.4–8.4)
PROT UR STRIP-MCNC: NEGATIVE MG/DL
QRS AXIS: -34 DEGREES
QRSD INTERVAL: 96 MS
QT INTERVAL: 348 MS
QTC INTERVAL: 446 MS
RBC # BLD AUTO: 4.74 MILLION/UL (ref 3.81–5.12)
RBC #/AREA URNS AUTO: NORMAL /HPF
SODIUM SERPL-SCNC: 140 MMOL/L (ref 135–147)
SP GR UR STRIP.AUTO: 1.01 (ref 1–1.03)
T WAVE AXIS: 79 DEGREES
UROBILINOGEN UR QL STRIP.AUTO: 0.2 E.U./DL
VENTRICULAR RATE: 99 BPM
WBC # BLD AUTO: 7.69 THOUSAND/UL (ref 4.31–10.16)
WBC #/AREA URNS AUTO: NORMAL /HPF

## 2024-08-29 PROCEDURE — 93010 ELECTROCARDIOGRAM REPORT: CPT | Performed by: INTERNAL MEDICINE

## 2024-08-29 PROCEDURE — 97116 GAIT TRAINING THERAPY: CPT

## 2024-08-29 PROCEDURE — 97163 PT EVAL HIGH COMPLEX 45 MIN: CPT

## 2024-08-29 PROCEDURE — 36415 COLL VENOUS BLD VENIPUNCTURE: CPT | Performed by: EMERGENCY MEDICINE

## 2024-08-29 PROCEDURE — 81001 URINALYSIS AUTO W/SCOPE: CPT | Performed by: EMERGENCY MEDICINE

## 2024-08-29 PROCEDURE — 96361 HYDRATE IV INFUSION ADD-ON: CPT

## 2024-08-29 PROCEDURE — 85025 COMPLETE CBC W/AUTO DIFF WBC: CPT | Performed by: EMERGENCY MEDICINE

## 2024-08-29 PROCEDURE — 99285 EMERGENCY DEPT VISIT HI MDM: CPT

## 2024-08-29 PROCEDURE — 82306 VITAMIN D 25 HYDROXY: CPT | Performed by: EMERGENCY MEDICINE

## 2024-08-29 PROCEDURE — 97167 OT EVAL HIGH COMPLEX 60 MIN: CPT

## 2024-08-29 PROCEDURE — 96372 THER/PROPH/DIAG INJ SC/IM: CPT

## 2024-08-29 PROCEDURE — 93005 ELECTROCARDIOGRAM TRACING: CPT

## 2024-08-29 PROCEDURE — 80053 COMPREHEN METABOLIC PANEL: CPT | Performed by: EMERGENCY MEDICINE

## 2024-08-29 PROCEDURE — 96374 THER/PROPH/DIAG INJ IV PUSH: CPT

## 2024-08-29 PROCEDURE — 99285 EMERGENCY DEPT VISIT HI MDM: CPT | Performed by: EMERGENCY MEDICINE

## 2024-08-29 RX ORDER — DIPHENHYDRAMINE HYDROCHLORIDE 50 MG/ML
25 INJECTION INTRAMUSCULAR; INTRAVENOUS ONCE
Status: COMPLETED | OUTPATIENT
Start: 2024-08-29 | End: 2024-08-29

## 2024-08-29 RX ORDER — DIAZEPAM 10 MG/2ML
5 INJECTION, SOLUTION INTRAMUSCULAR; INTRAVENOUS ONCE
Status: DISCONTINUED | OUTPATIENT
Start: 2024-08-29 | End: 2024-08-29

## 2024-08-29 RX ORDER — DIPHENHYDRAMINE HYDROCHLORIDE 50 MG/ML
25 INJECTION INTRAMUSCULAR; INTRAVENOUS ONCE
Status: DISCONTINUED | OUTPATIENT
Start: 2024-08-29 | End: 2024-08-29

## 2024-08-29 RX ORDER — MIDAZOLAM HYDROCHLORIDE 2 MG/2ML
2 INJECTION, SOLUTION INTRAMUSCULAR; INTRAVENOUS ONCE
Status: DISCONTINUED | OUTPATIENT
Start: 2024-08-29 | End: 2024-08-29 | Stop reason: HOSPADM

## 2024-08-29 RX ORDER — DROPERIDOL 2.5 MG/ML
2.5 INJECTION, SOLUTION INTRAMUSCULAR; INTRAVENOUS ONCE
Status: COMPLETED | OUTPATIENT
Start: 2024-08-29 | End: 2024-08-29

## 2024-08-29 RX ADMIN — DROPERIDOL 2.5 MG: 2.5 INJECTION, SOLUTION INTRAMUSCULAR; INTRAVENOUS at 12:58

## 2024-08-29 RX ADMIN — SODIUM CHLORIDE 1000 ML: 0.9 INJECTION, SOLUTION INTRAVENOUS at 09:08

## 2024-08-29 RX ADMIN — DIPHENHYDRAMINE HYDROCHLORIDE 25 MG: 50 INJECTION, SOLUTION INTRAMUSCULAR; INTRAVENOUS at 13:06

## 2024-08-29 NOTE — ED PROVIDER NOTES
History  Chief Complaint   Patient presents with    Dizziness     Pt presented to this ED with family c/o dizzinesss w/increased weakness, headache, insomnia, and anxiety over past couple months. Pt recently seen in this ED. Has appt with ENT tomorrow per further testing r/o menieres. Family stated [facility] w/instructions to take pt to ED per further evaluation.      This is a 73-year-old female presenting to the ED for evaluation of dizziness, weakness, headache and insomnia with anxiety worsening over the past several months.  Patient is mainly concerned with the persistent tinnitus for which has been persistent for months with no resolution.  Patient was recently seen in the ED and had a full workup for labs as well as CT and MRI with no acute findings.  Patient is being worked up by ENT and scheduled for an appointment tomorrow to rule out Ménière's disease.  Her family states that she was prescribed Klonopin to help with anxiety however she was told by ENT not to take the Klonopin until her testing for Ménière's was complete.  Daughter is concerned because mother has not slept in 3 days and she has had a change in attitude and is more agitated.  Patient is not suicidal but as a result of not sleeping her daughter and  are concerned.        Prior to Admission Medications   Prescriptions Last Dose Informant Patient Reported? Taking?   LORazepam (Ativan) 0.5 mg tablet   No No   Sig: Take 1 tablet (0.5 mg total) by mouth 3 (three) times a day as needed for anxiety or sleep for up to 10 days   acetaminophen (TYLENOL) 325 mg tablet   Yes No   Sig: Take 650 mg by mouth every 6 (six) hours as needed for mild pain   albuterol (ProAir HFA) 90 mcg/act inhaler   No No   Sig: Inhale 2 puffs every 6 (six) hours as needed for wheezing or shortness of breath   amLODIPine (NORVASC) 5 mg tablet   No No   Sig: Take 1 tablet (5 mg total) by mouth daily for 20 days   citalopram (CeleXA) 20 mg tablet   No  No   Sig: Take 1 tablet daily   ferrous sulfate 325 (65 Fe) mg tablet   Yes No   Sig: Take 325 mg by mouth daily with breakfast   guaiFENesin (MUCINEX PO)   Yes No   Sig: Take by mouth   Patient not taking: Reported on 2/28/2024   meclizine (ANTIVERT) 25 mg tablet   No No   Sig: Take 1 tablet (25 mg total) by mouth 3 (three) times a day as needed for dizziness   mometasone (ELOCON) 0.1 % cream   No No   Sig: Apply topically daily for 5 days   Patient not taking: Reported on 2/28/2024   omeprazole (PriLOSEC) 20 mg delayed release capsule   No No   Sig: Take 1 capsule (20 mg total) by mouth daily   Patient not taking: Reported on 2/28/2024      Facility-Administered Medications: None       Past Medical History:   Diagnosis Date    Depression     Esophageal stricture     History of gastroesophageal reflux (GERD)     Iron deficiency anemia     Meniere disease        Past Surgical History:   Procedure Laterality Date    COLONOSCOPY  2012    GALLBLADDER SURGERY      HYSTERECTOMY      OTHER SURGICAL HISTORY      Endoscopy of stricture 2010, 2012, 5/2014 - Esophageal ulcer; 7/2014 - Esophageal ulcer; 10/2015 (Dr. Jones) - Esophageal stricture; 10/2016; 11/2017 - Stricture       Family History   Problem Relation Age of Onset    Stroke Mother     Hypertension Mother     Emphysema Father      I have reviewed and agree with the history as documented.    E-Cigarette/Vaping    E-Cigarette Use Never User      E-Cigarette/Vaping Substances     Social History     Tobacco Use    Smoking status: Some Days     Types: Cigarettes    Smokeless tobacco: Never   Vaping Use    Vaping status: Never Used   Substance Use Topics    Alcohol use: Not Currently     Comment: Denies alcohol use - As per [facility]     Drug use: Not Currently       Review of Systems   Neurological:  Positive for dizziness.   Psychiatric/Behavioral:  Positive for behavioral problems, confusion and sleep disturbance.    All other systems reviewed and are  negative.      Physical Exam  Physical Exam  Vitals and nursing note reviewed.   Constitutional:       General: She is not in acute distress.     Appearance: Normal appearance. She is well-developed and normal weight. She is not ill-appearing.   HENT:      Head: Normocephalic and atraumatic.      Right Ear: External ear normal.      Left Ear: External ear normal.      Nose: Nose normal. No congestion or rhinorrhea.      Mouth/Throat:      Mouth: Mucous membranes are moist.   Eyes:      Extraocular Movements: Extraocular movements intact.      Conjunctiva/sclera: Conjunctivae normal.   Cardiovascular:      Rate and Rhythm: Normal rate and regular rhythm.      Heart sounds: No murmur heard.  Pulmonary:      Effort: Pulmonary effort is normal. No respiratory distress.      Breath sounds: Normal breath sounds.   Abdominal:      General: Abdomen is flat. Bowel sounds are normal. There is no distension.      Palpations: Abdomen is soft. There is no mass.      Tenderness: There is no abdominal tenderness. There is no right CVA tenderness, left CVA tenderness, guarding or rebound.      Hernia: No hernia is present.   Musculoskeletal:         General: No swelling. Normal range of motion.      Cervical back: Normal range of motion and neck supple. No rigidity.   Lymphadenopathy:      Cervical: No cervical adenopathy.   Skin:     General: Skin is warm and dry.      Capillary Refill: Capillary refill takes less than 2 seconds.      Coloration: Skin is not jaundiced or pale.      Findings: No bruising, erythema, lesion or rash.   Neurological:      General: No focal deficit present.      Mental Status: She is alert. She is disoriented.      Cranial Nerves: No cranial nerve deficit.      Sensory: No sensory deficit.      Gait: Gait abnormal.      Comments: Patient noted to have unsteady gait and is shaky   Psychiatric:         Behavior: Behavior normal.      Comments: Delirum           Vital Signs  ED Triage Vitals [08/29/24  0726]   Temperature Pulse Respirations Blood Pressure SpO2   97.8 °F (36.6 °C) 104 18 154/71 97 %      Temp src Heart Rate Source Patient Position - Orthostatic VS BP Location FiO2 (%)   -- Monitor Lying Right arm --      Pain Score       6           Vitals:    08/29/24 1211 08/29/24 1220 08/29/24 1223 08/29/24 1425   BP: 140/67 133/66 131/61 145/68   Pulse:    88   Patient Position - Orthostatic VS: Sitting - Orthostatic VS Standing - Orthostatic VS Standing for 3 minutes - Orthostatic VS Lying         Visual Acuity      ED Medications  Medications   midazolam (VERSED) injection 2 mg (0 mg Intravenous Hold 8/29/24 1415)   sodium chloride 0.9 % bolus 1,000 mL (0 mL Intravenous Stopped 8/29/24 1008)   droperidol (INAPSINE) injection 2.5 mg (2.5 mg Intramuscular Given 8/29/24 1258)   diphenhydrAMINE (BENADRYL) injection 25 mg (25 mg Intravenous Given 8/29/24 1306)       Diagnostic Studies  Results Reviewed       Procedure Component Value Units Date/Time    Vitamin D 25 hydroxy [[id number]]  (Normal) Collected: 08/29/24 0903    Lab Status: Final result Specimen: Blood from Arm, Right Updated: 08/29/24 1421     Vit D, 25-Hydroxy 44.8 ng/mL     Comprehensive metabolic panel [[id number]] Collected: 08/29/24 0903    Lab Status: Final result Specimen: Blood from Arm, Right Updated: 08/29/24 0932     Sodium 140 mmol/L      Potassium 4.9 mmol/L      Chloride 106 mmol/L      CO2 27 mmol/L      ANION GAP 7 mmol/L      BUN 13 mg/dL      Creatinine 0.62 mg/dL      Glucose 108 mg/dL      Calcium 10.1 mg/dL      AST 22 U/L      ALT 13 U/L      Alkaline Phosphatase 76 U/L      Total Protein 7.0 g/dL      Albumin 3.9 g/dL      Total Bilirubin 0.45 mg/dL      eGFR 89 ml/min/1.73sq m     Narrative:      National Kid[employer] Foundation guidelines for Chronic Kidney Disease (CKD):     Stage 1 with normal or high GFR (GFR > 90 mL/min/1.73 square meters)    Stage 2 Mild CKD (GFR = 60-89 mL/min/1.73 square meters)    Stage 3A Moderate CKD  (GFR = 45-59 mL/min/1.73 square meters)    Stage 3B Moderate CKD (GFR = 30-44 mL/min/1.73 square meters)    Stage 4 Severe CKD (GFR = 15-29 mL/min/1.73 square meters)    Stage 5 End Stage CKD (GFR <15 mL/min/1.73 square meters)  Note: GFR calculation is accurate only with a steady state creatinine    Urine Microscopic [[id number]] Collected: 08/29/24 0838    Lab Status: Final result Specimen: Urine, Other Updated: 08/29/24 0926     RBC, UA 1-2 /hpf      WBC, UA 1-2 /hpf      Epithelial Cells Occasional /hpf      Bacteria, UA Occasional /hpf      AMORPH URATES Occasional /hpf     UA w Reflex to Microscopic w Reflex to Culture [[id number]]  (Abnormal) Collected: 08/29/24 0838    Lab Status: Final result Specimen: Urine, Other Updated: 08/29/24 0920     Color, UA Yellow     Clarity, UA Clear     Specific Gravity, UA 1.010     pH, UA 7.0     Leukocytes, UA Negative     Nitrite, UA Negative     Protein, UA Negative mg/dl      Glucose, UA Negative mg/dl      Ketones, UA Negative mg/dl      Urobilinogen, UA 0.2 E.U./dl      Bilirubin, UA Negative     Occult Blood, UA Small    CBC and differential [[id number]]  (Abnormal) Collected: 08/29/24 0903    Lab Status: Final result Specimen: Blood from Arm, Right Updated: 08/29/24 0913     WBC 7.69 Thousand/uL      RBC 4.74 Million/uL      Hemoglobin 13.9 g/dL      Hematocrit 43.7 %      MCV 92 fL      MCH 29.3 pg      MCHC 31.8 g/dL      RDW 14.5 %      MPV 9.1 fL      Platelets 453 Thousands/uL      nRBC 0 /100 WBCs      Segmented % 77 %      Immature Grans % 0 %      Lymphocytes % 15 %      Monocytes % 7 %      Eosinophils Relative 0 %      Basophils Relative 1 %      Absolute Neutrophils 5.86 Thousands/µL      Absolute Immature Grans 0.02 Thousand/uL      Absolute Lymphocytes 1.17 Thousands/µL      Absolute Monocytes 0.55 Thousand/µL      Eosinophils Absolute 0.02 Thousand/µL      Basophils Absolute 0.07 Thousands/µL                    No orders to display               Procedures  Procedures         ED Course  ED Course as of 08/29/24 1505   Thu Aug 29, 2024   1042 Case discussed with Dr. Coe the hospitalist recommends PT OT eval.   1243 Patient was seen and evaluated by physical therapy recommends discharge with a walker.   1255 Case was discussed with Dr Susan OAKES. He is comfortable with the patient being medicated and states the outpatient study will be adjusted accordingly.  Discussed with family that patient can fill her Klonopin and take it as it was prescribed to help with her insomnia.  They are also encouraged to follow-up with PCP.  And ambulatory referral was placed for psychiatry as well.                                 SBIRT 22yo+      Flowsheet Row Most Recent Value   Initial Alcohol Screen: US AUDIT-C     1. How often do you have a drink containing alcohol? 0 Filed at: 08/29/2024 0750   2. How many drinks containing alcohol do you have on a typical day you are drinking?  0 Filed at: 08/29/2024 0750   3b. FEMALE Any Age, or MALE 65+: How often do you have 4 or more drinks on one occassion? 0 Filed at: 08/29/2024 0750   Audit-C Score 0 Filed at: 08/29/2024 0750   SARHA: How many times in the past year have you...    Used an illegal drug or used a prescription medication for non-medical reasons? Never Filed at: 08/29/2024 0750                      Medical Decision Making  Differential diagnosis includes but not limited to: Dementia, delirium, tinnutis, Ménière's disease    Amount and/or Complexity of Data Reviewed  Labs: ordered.    Risk  Prescription drug management.                 Disposition  Final diagnoses:   Dizziness   Delirium   Insomnia     Time reflects when diagnosis was documented in both MDM as applicable and the Disposition within this note       Time User Action Codes Description Comment    8/29/2024  1:43 PM Thorpe, Dasandraia Add [R42] Dizziness     8/29/2024  1:47 PM Thorpe, Daayl Add [R41.0] Delirium     8/29/2024  1:48 PM Thorpe, Dahlia Add  [G47.00] Insomnia           ED Disposition       ED Disposition   Discharge    Condition   Stable    Date/Time   Thu Aug 29, 2024 1343    Comment   Bea Perez discharge to home/self care.                   Follow-up Information       Follow up With Specialties Details Why Contact Info    Joyce Mcpherson MD  Schedule an appointment as soon as possible for a visit   [address]  [facility] [address]  [phone]      Alexis Davis MD Otolaryngology Schedule an appointment as soon as possible for a visit   [address] [facility] [address]  [facility] [address] [phone]              Discharge Medication List as of 8/29/2024  1:50 PM        CONTINUE these medications which have NOT CHANGED    Details   acetaminophen (TYLENOL) 325 mg tablet Take 650 mg by mouth every 6 (six) hours as needed for mild pain, Historical Med      albuterol (ProAir HFA) 90 mcg/act inhaler Inhale 2 puffs every 6 (six) hours as needed for wheezing or shortness of breath, Starting Wed 2/28/2024, Normal      amLODIPine (NORVASC) 5 mg tablet Take 1 tablet (5 mg total) by mouth daily for 20 days, Starting Sat 7/13/2024, Until Fri 8/2/2024, Normal      citalopram (CeleXA) 20 mg tablet Take 1 tablet daily, Normal      ferrous sulfate 325 (65 Fe) mg tablet Take 325 mg by mouth daily with breakfast, Historical Med      guaiFENesin (MUCINEX PO) Take by mouth, Historical Med      LORazepam (Ativan) 0.5 mg tablet Take 1 tablet (0.5 mg total) by mouth 3 (three) times a day as needed for anxiety or sleep for up to 10 days, Starting Wed 8/28/2024, Until Sat 9/7/2024 at 2359, Normal      meclizine (ANTIVERT) 25 mg tablet Take 1 tablet (25 mg total) by mouth 3 (three) times a day as needed for dizziness, Starting Sat 7/13/2024, Normal      mometasone (ELOCON) 0.1 % cream Apply topically daily for 5 days, Starting Sun 7/22/2018, Until Wed 1/31/2024, Normal      omeprazole (PriLOSEC) 20 mg delayed release capsule Take 1 capsule (20 mg  total) by mouth daily, Starting Mon 5/24/2021, Normal                 PDMP Review         Value Time User    PDMP Reviewed  Yes 8/28/2024  3:33 AM Kevin Sorto DO            ED Provider  Electronically Signed by             Zenaida Ortiz DO  08/29/24 1320

## 2024-08-29 NOTE — ED NOTES
Pt states feeling tired, but unable to sleep. Family states she may be dehydrated.      Chantal Newman RN  08/29/24 5027

## 2024-08-29 NOTE — OCCUPATIONAL THERAPY NOTE
Occupational Therapy Evaluation     Patient Name: Bea Perez  Today's Date: 8/29/2024  Problem List  Active Problems:  There are no active Hospital Problems.    Past Medical History  Past Medical History:   Diagnosis Date    Depression     Esophageal stricture     History of gastroesophageal reflux (GERD)     Iron deficiency anemia     Meniere disease      Past Surgical History  Past Surgical History:   Procedure Laterality Date    COLONOSCOPY  2012    GALLBLADDER SURGERY      HYSTERECTOMY      OTHER SURGICAL HISTORY      Endoscopy of stricture 2010, 2012, 5/2014 - Esophageal ulcer; 7/2014 - Esophageal ulcer; 10/2015 (Dr. Jones) - Esophageal stricture; 10/2016; 11/2017 - Stricture        08/29/24 8209   Note Type   Note type Evaluation   Pain Assessment   Pain Assessment Tool 0-10   Pain Score No Pain   Pain Location/Orientation Orientation: Right;Location: Head  (Headache)   Pain Rating: FLACC (Rest) - Face 0   Pain Rating: FLACC (Rest) - Legs 0   Pain Rating: FLACC (Rest) - Activity 0   Pain Rating: FLACC (Rest) - Cry 0   Pain Rating: FLACC (Rest) - Consolability 0   Score: FLACC (Rest) 0   Pain Rating: FLACC (Activity) - Face 1   Pain Rating: FLACC (Activity) - Legs 0   Pain Rating: FLACC (Activity) - Activity 0   Pain Rating: FLACC (Activity) - Cry 1   Pain Rating: FLACC (Activity) - Consolability 0   Score: FLACC (Activity) 2   Restrictions/Precautions   Other Precautions Fall Risk   Home Living   Type of Home House  (1 small EJ)   Home Layout One level;Performs ADLs on one level;Able to live on main level with bedroom/bathroom   Bathroom Shower/Tub Walk-in shower   Bathroom Toilet Standard  (BSC over top)   Bathroom Equipment Grab bars in shower;Shower chair   Bathroom Accessibility Accessible   Home Equipment Walker;Cane;Wheelchair-manual   Additional Comments Pt reports living in a 1SH with 1 small EJ and wasn't using AD for functional mobility PTA.   Prior Function   Level of [place] Independent  with ADLs;Independent with functional mobility;Independent with IADLS   Lives With Spouse   Receives Help From Family   IADLs Independent with driving;Independent with meal prep;Independent with medication management  (Daughter has been helping with medications and meals for the past week, pt typically independent with IADLs. Pt does admit to driving yesterday)   Falls in the last 6 months 0   Comments PTA, pt reports independence with ADLs, IADLs, and functional mobility although admits she has been struggling more over the last week.   General   Additional Pertinent History Balance issues started about ~6 weeks ago and have gotten progressively worse since then. Severe insomnia, hasn't slept in over 5 days.   Family/Caregiver Present Yes  (Daughter)   Additional General Comments History of significant L sided hearing loss. Per pt, high pitched ringing on the R and low humming buzz sound on the L constantly.   ADL   UB Dressing Assistance 5  Supervision/Setup   UB Dressing Deficit Verbal cueing;Supervision/safety;Increased time to complete   LB Dressing Assistance 5  Supervision/Setup   LB Dressing Deficit Don/doff R sock;Don/doff L sock   Additional Comments Pt able to doff/don B socks while seated EOB. Anticipate UB/LB ADLs can be completed in the seated position with supervision. Would require more assistance for ADLs in standing due to balance deficits.   Bed Mobility   Supine to Sit 5  Supervision  (HOB flat, no bedrails)   Additional items Verbal cues   Additional Comments Pt received supine in bed upon start of session. Pt supine > sit EOB with supervision and VCs.   Transfers   Sit to Stand   (SBA-->supervision)   Additional items Increased time required;Verbal cues   Stand to Sit   (SBA-->supervision)   Additional items Increased time required;Verbal cues   Stand pivot   (SBA/steadying assistance-->supervision)   Additional items Assist x 1;Increased time required;Verbal cues   Additional Comments  Functional transfers initially without AD, pt completing spt varying between SBA and steadying assistance for balance. With use of RW, pt progressing to supervision level for functional transfers.   Functional Mobility   Functional Mobility   (SBA/steadying assistance-->supervision)   Additional Comments Pt participated in funcitonal household distance in room and in hallway intially with SBA/steadying assistance and no AD, however progresses to supervision level with use of RW.   Balance   Static Sitting Good   Dynamic Sitting Fair +   Static Standing Fair   Dynamic Standing Fair -   Activity Tolerance   Activity Tolerance Patient limited by fatigue  (Pt limited by anxiety, feelings of dizziness, ringing in ears)   Medical Staff Made Aware PT, Jaime and Dr. Ortiz   Nurse Made Aware RNs, Najma and Sergio   RUE Assessment   RUE Assessment WFL  (Strength grossly 4+/5)   LUE Assessment   LUE Assessment WFL  (Strength grossly 4-/5)   Hand Function   Gross Motor Coordination Functional   Fine Motor Coordination Functional   Hand Function Comments Pt is R hand dominant.   Sensation   Light Touch No apparent deficits   Vision-Basic Assessment   Current Vision Wears glasses all the time   Visual History Cataracts;Macular degeneration   Patient Visual Report Unable to keep objects in focus;Balance difficulty   Vision - Complex Assessment   Tracking   (Saccades noted when tracking to the L during beginning of session however not evident at end of session)   Cognition   Overall Cognitive Status WFL   Arousal/Participation Alert;Cooperative   Attention Within functional limits   Orientation Level Oriented X4   Memory Within functional limits   Following Commands Follows one step commands without difficulty   Comments Pt feels as though she has brain fog. Anxiety noted. Tearful at times and very fatigued.   Assessment   Limitation Decreased ADL status;Decreased UE strength;Decreased Safe judgement during ADL;Decreased  endurance;Visual deficit;Decreased self-care trans;Decreased high-level ADLs   Prognosis Fair   Assessment Pt is a 73 y.o. female, admitted to [facility] 8/29/2024 d/t experiencing dizziness. Dx: Dizziness. Pt with PMHx impacting their performance during ADL tasks, including: depression, esophageal stricture, GERD, iron deficiency anemia, Meniere disease. Prior to admission to the hospital Pt was performing ADLs without physical assistance. IADLs without physical assistance. Functional transfers/ambulation without physical assistance. Cognitive status PTA was WFL. OT order placed to assess Pt's ADLs, cognitive status, and performance during functional tasks in order to maximize safety and independence while making most appropriate d/c recommendations. PT/OT co-evaluation completed at this time d/t significant mobility deficits and safety concerns. Pt's clinical presentation is currently unstable/unpredictable given new onset deficits that affect Pt's occupational performance and ability to safely return to PLOF including decrease activity tolerance, decrease standing balance, decrease performance during ADL tasks, decrease activity engagement, decrease performance during functional transfers, and limited insight to deficits combined with medical complications of hypertension , fear/retreat, and need for input for mobility technique/safety. Personal factors affecting Pt at time of initial evaluation include: anxiety, new need for AD, limited insight into impairments, and decreased initiation and engagement. Pt will benefit from continued skilled acute OT services to address deficits as defined above and to maximize level independence/participation during ADLs and functional tasks to facilitate return toward PLOF and improved quality of life. Pt anticipated to progress well towards goals of care. From an occupational therapy standpoint, No Post-Acute Rehabilitation Needs are recommended upon d/c, however recommending  continuing to follow with ENT, use of RW for functional mobility/ADLs and OP PT/vestibular therapy.   Goals   Patient Goals to know what is going on with her   Plan   Treatment Interventions ADL retraining;Visual perceptual retraining;Functional transfer training;UE strengthening/ROM;Endurance training;Patient/family training;Equipment evaluation/education;Compensatory technique education;Continued evaluation;Energy conservation;Activityengagement   Goal Expiration Date 09/12/24   OT Frequency 2-3x/wk   Discharge Recommendation   Rehab Resource Intensity Level, OT No post-acute rehabilitation needs  (Continue to follow with ENT, continued support of family. Use of RW during ADLs and functional mobility, OP PT/vestibular therapy)   AM-PAC Daily Activity Inpatient   Lower Body Dressing 3   Bathing 3   Toileting 3   Upper Body Dressing 3   Grooming 3   Eating 4   Daily Activity Raw Score 19   Daily Activity Standardized Score (Calc for Raw Score >=11) 40.22   -PAC Applied Cognition Inpatient   Following a Speech/Presentation 3   Understanding Ordinary Conversation 4   Taking Medications 3   Remembering Where Things Are Placed or Put Away 3   Remembering List of 4-5 Errands 3   Taking Care of Complicated Tasks 2   Applied Cognition Raw Score 18   Applied Cognition Standardized Score 38.07   End of Consult   Patient Position at End of Consult Supine;All needs within reach     The patient's raw score on the AM-PAC Daily Activity Inpatient Short Form is 19. A raw score of greater than or equal to 19 suggests the patient may benefit from discharge to home. Please refer to the recommendation of the Occupational Therapist for safe discharge planning.    Pt goals to be met by 9/12/2024:    Pt will demonstrate ability to complete grooming/hygiene tasks @ mod I after set-up.  Pt will demonstrate ability to complete supine<>sit @ mod I in order to increase safety and independence during ADL tasks.  Pt will demonstrate ability  to complete UB ADLs including washing/dressing @ mod I in order to increase performance and participation during meaningful tasks  Pt will demonstrate ability to complete LB dressing @ mod I in order to increase safety and independence during meaningful tasks.   Pt will demonstrate ability to brigido/doff socks/shoes while sitting EOB/chair @ mod I in order to increase safety and independence during meaningful tasks.   Pt will demonstrate ability to complete toileting tasks including CM and pericare @ mod I in order to increase safety and independence during meaningful tasks.  Pt will demonstrate ability to complete EOB, chair, toilet/commode transfers @ mod I in order to increase performance and participation during functional tasks.  Pt will demonstrate ability to stand for 20 minutes while maintaining F+ balance with use of RW for UB support PRN.  Pt will demonstrate ability to tolerate 30 minute OT session with no vc'ing for deep breathing or use of energy conservation techniques in order to increase activity tolerance during functional tasks.   Pt will demonstrate Good carryover of use of energy conservation/compensatory strategies during ADLs and functional tasks in order to increase safety and reduce risk for falls.   Pt will demonstrate Good attention and participation in continued evaluation of functional ambulation house hold distances in order to assist with safe d/c planning.  Pt will attend to continued cognitive assessments 100% of the time in order to provide most appropriate d/c recommendations.   Pt will follow 100% simple 2-step commands and be A&O x4 consistently with environmental cues to increase participation in functional activities.   Pt will identify 3 areas of interest/hobbies and 1 intervention on how to incorporate into daily life in order to increase interaction with environment and peers as well as increase participation in meaningful tasks.   Pt will demonstrate 100% carryover of BUE HEP  in order to increase BUE MS and increase performance during functional tasks upon d/c home.    Asaf Frazier MS, OTR/L

## 2024-08-29 NOTE — DISCHARGE INSTRUCTIONS
Return to the ER immediately for any worsening symptoms.  Please follow-up with your PCP and ENT for further evaluation and management.  Please take the Klonopin you are prescribed for anxiety and agitation

## 2024-08-29 NOTE — ED NOTES
This RN was at nursing station and patient came to room door. Patient was noted to be dripping blood all over the floor. IV noticeably out. This RN assisted patient to sit in chair and held pressure w/ guaze to stop bleeding. Patient reports she does not know why she didn't ring call bell, but wanted to see what was going on. Floor cleaned and patient's vitals checked. Daughter and  came back to patient's room and discharge instructions gone over again. Patient assisted w/ getting dressed and left w/  and daughter      Breonna Wilder RN  08/29/24 8002

## 2024-08-29 NOTE — ED NOTES
Pt states feeling like having brain fog. Visible tremors. Very anxious.      Chantal Newman RN  08/29/24 5311

## 2024-08-29 NOTE — PHYSICAL THERAPY NOTE
" PHYSICAL THERAPY EVALUATION      NAME:  Bea Perez  DATE: 08/29/24    AGE:   73 y.o.  Mrn:   [id number]  ADMIT DX:  Dizziness [R42]    Past Medical History:   Diagnosis Date    Depression     Esophageal stricture     History of gastroesophageal reflux (GERD)     Iron deficiency anemia     Meniere disease      Length Of Stay: 0  Performed at least 2 patient identifiers during session: Name and Birthday          PHYSICAL THERAPY EVALUATION:       08/29/24 1158   PT Last Visit   PT Visit Date 08/29/24   Note Type   Note type Evaluation   Pain Assessment   Pain Assessment Tool FLACC   Pain Location/Orientation   (R temporal area (head))   Pain Rating: FLACC (Rest) - Face 0   Pain Rating: FLACC (Rest) - Legs 0   Pain Rating: FLACC (Rest) - Activity 0   Pain Rating: FLACC (Rest) - Cry 0   Pain Rating: FLACC (Rest) - Consolability 0   Score: FLACC (Rest) 0   Pain Rating: FLACC (Activity) - Face 1   Pain Rating: FLACC (Activity) - Legs 0   Pain Rating: FLACC (Activity) - Activity 0   Pain Rating: FLACC (Activity) - Cry 1   Pain Rating: FLACC (Activity) - Consolability 0   Score: FLACC (Activity) 2   Restrictions/Precautions   Weight Bearing Precautions Per Order No   Other Precautions Fall Risk   Home Living   Type of Home House   Home Layout One level;Performs ADLs on one level;Able to live on main level with bedroom/bathroom  (1 small EJ)   Bathroom Shower/Tub Walk-in shower   Bathroom Toilet Standard  (BSC frame over top)   Bathroom Equipment Grab bars in shower;Shower chair   Bathroom Accessibility Accessible   Home Equipment Walker;Cane;Wheelchair-manual   Prior Function   Level of [place] Independent with ADLs;Independent with functional mobility;Independent with IADLS  (states an increase in \"struggle\" with ADLs over the last week)   Lives With Spouse  (son lives right behind pt as well)   Receives Help From Family  (dtr, son)   IADLs Independent with driving;Independent with meal prep;Independent with " "medication management  (last time pt drove:  yesterday)   Falls in the last 6 months 0   Comments IND no AD at baseline   General   Additional Pertinent History Noted dizziness & tinnitus beginning about 6 wks ago.  Has been in consultation with OPPT for vestibular therapy as well as ENT (Dr. Davis).  She is supposed to have testing with Dr. Davis tomorrow scheduled as outpatient.  Dtr states that pt only attended 2 sessions of OP vestibular therapy.  Dtr reports that pt has not slept in 5 days and really has not had any intake over those 5 days as well.  Sounds like the dizziness and ringing in her ear(s) has resulted in increased anxiety and is impacting her mental health.   Family/Caregiver Present Yes  (dtr)   Cognition   Overall Cognitive Status WFL   Arousal/Participation Cooperative   Orientation Level Oriented X4   Comments fatigued; continually yawning; dtr answering quite a few of therapist's questions.  pt tearful at times   Subjective   Subjective \"I'm really tired, I need to sleep.\"   RLE Assessment   RLE Assessment WFL   LLE Assessment   LLE Assessment WFL   Vestibular   Vestibular Comments BPPV testing:  Negative   Coordination   Movements are Fluid and Coordinated   (No)   Heel to [place] Impaired  (bilaterally)   Light Touch   RLE Light Touch Grossly intact   LLE Light Touch Grossly intact   Proprioception   RLE Proprioception Grossly intact  (great toe)   LLE Proprioception Grossly Intact  (great toe)   Bed Mobility   Supine to Sit 5  Supervision   Transfers   Sit to Stand   (SBA)   Stand to Sit   (SBA)   Stand pivot   (SBA - steadying)   Additional items Verbal cues   Additional Comments No AD   Ambulation/Elevation   Gait pattern Improper Weight shift;Inconsistent sri;Redundant gait at times  (intermittent LOB however able to self-correct)   Gait Assistance   (SBA - steadying)   Additional items Assist x 1;Verbal cues   Assistive Device None   Distance 128 ft   Balance   Static Sitting Good " "  Dynamic Sitting Fair +   Static Standing Fair   Dynamic Standing Fair -   Ambulatory Fair   Activity Tolerance   Activity Tolerance Patient limited by fatigue  (anxiety)   Medical Staff Made Aware OT Contreras Ortiz   Nurse Made Aware Yes   Assessment   Prognosis Good   Problem List Decreased strength;Decreased endurance;Impaired balance;Decreased mobility;Pain;Impaired judgement   Barriers to Discharge Other (Comment)   Barriers to Discharge Comments anxiety   Goals   STG Expiration Date 09/12/24   PT Treatment Day 1   Plan   Treatment/Interventions ADL retraining;Functional transfer training;LE strengthening/ROM;Elevations;Therapeutic exercise;Endurance training;Patient/family training;Equipment eval/education;Bed mobility;Gait training;Compensatory technique education;Spoke to nursing;Spoke to MD;OT   PT Frequency 2-3x/wk   Discharge Recommendation   Rehab Resource Intensity Level, PT III (Minimum Resource Intensity)  (OP vestibular PT)   Additional Comments Using RW (pt owns).  Follow up with ENT.   Additional Comments 2 continued family support with hopeful improvement in hydration and nutrition   AM-PAC Basic Mobility Inpatient   Turning in Flat Bed Without Bedrails 3   Lying on Back to Sitting on Edge of Flat Bed Without Bedrails 3   Moving Bed to Chair 3   Standing Up From Chair Using Arms 3   Walk in Room 3   Climb 3-5 Stairs With Railing 3   Basic Mobility Inpatient Raw Score 18   Basic Mobility Standardized Score 41.05   [facility] Highest Level Of Mobility   -HLM Goal 6: Walk 10 steps or more   -HLM Achieved 7: Walk 25 feet or more   Additional Treatment Session   Start Time 1231   End Time 1242   Treatment Assessment Pt tolerates tx session fairly.  PT introduces RW for functional mobility.  Pt noted to have improved balance and overall sense of stability using RW as compared to no AD.  She continues to appear anxious.  Her \"dizziness\" level varies from 4/10 - 6/10.  She demonstrates the " "ability to scan her environment while ambulating with RW.  Dtr states that when pt went to OPPT a couple wks ago that the therapist there \"felt something was off.\"   Equipment Use Pt ambulates 110 ft using RW and SPV.  Pt completes sit to supine mod (I).   End of Consult   Patient Position at End of Consult Supine;All needs within reach  (dtr present)         08/29/24 1208 08/29/24 1211 08/29/24 1220   Vitals   Pulse 97  --   --    Blood Pressure 135/63 140/67 133/66   Patient Position - Orthostatic VS Lying - Orthostatic VS  (Dizziness 4/10) Sitting - Orthostatic VS Standing - Orthostatic VS      08/29/24 1223   Vitals   Pulse  --    Blood Pressure 131/61   Patient Position - Orthostatic VS Standing for 3 minutes - Orthostatic VS  (After ambulation)     (Please find full objective findings from PT assessment regarding body systems outlined above).     Assessment: Pt is a 73 y.o. female seen for PT evaluation s/p admission to [facility] on 8/29/2024 with dizziness.  Order placed for PT services.  Upon evaluation: Pt is presenting with impaired functional mobility due to pain, decreased strength, decreased endurance, impaired balance, gait deviations, impaired judgment, and fall risk requiring  SPV assistance for bed mobility and stand-by to steadying assistance for transfers and ambulation with no AD . Pt's clinical presentation is currently unstable/unpredictable given the functional mobility deficits above, especially weakness, impaired balance, decreased activity tolerance, and impaired vestibular-occular function, coupled with fall risks as indicated by AM-PAC 6-Clicks: 18/24 as well as impaired balance.  Pt's PMHx and comorbidities that may affect physical performance and progress include: anxiety, depression, and questionable Meniere's Disease . Personal factors affecting pt at time of IE include: anxiety, depression, inability to perform IADLs, and inability to navigate community distances. " Pt will benefit from continued skilled PT services to address deficits as defined above and to maximize level of functional mobility to facilitate return toward PLOF and improved QOL. From PT/mobility standpoint, recommendation at time of d/c would be Level III (Minimum Resource Intensity) and with walker pending progress in order to reduce fall risk and maximize pt's functional independence and consistency with mobility in order to facilitate return to PLOF.  Recommend trial with walker next 1-2 sessions and ther ex next 1-2 sessions.     The patient's AM-PAC Basic Mobility Inpatient Short Form Raw Score is 18. A Raw score of greater than 16 suggests the patient may benefit from discharge to home. Please also refer to the recommendation of the Physical Therapist for safe discharge planning.       Goals: Pt will perform transfers with modified I to increase Indep in home environment and prepare for ambulation. Pt will ambulate with RW for >/= 250 ft with  modified I  to decrease risk for falls, improve activity tolerance, and improve gait quality and to access home environment. Pt will complete >/= 1 step with with unilateral handrail with modified I to return to home with EJ. Pt will participate in objective balance assessment to determine baseline fall risk.        Jaime Washington, PT,DPT

## 2024-09-10 DIAGNOSIS — R42 DIZZINESS AND GIDDINESS: ICD-10-CM

## 2024-09-17 ENCOUNTER — HOSPITAL ENCOUNTER (OUTPATIENT)
Dept: CT IMAGING | Facility: HOSPITAL | Age: 74
Discharge: HOME | End: 2024-09-17
Attending: OTOLARYNGOLOGY
Payer: MEDICARE

## 2024-09-17 DIAGNOSIS — R42 DIZZINESS AND GIDDINESS: ICD-10-CM

## 2024-09-17 PROCEDURE — 70480 CT ORBIT/EAR/FOSSA W/O DYE: CPT

## 2024-10-18 ENCOUNTER — TELEPHONE (OUTPATIENT)
Age: 74
End: 2024-10-18

## 2024-10-18 NOTE — TELEPHONE ENCOUNTER
Contacted Pt. in regards to ROUTINE Referral, not able [facility] to contact [phone] to discuss services needed at this time in order to be added to proper wait list ,mailbox is full

## 2024-10-28 ENCOUNTER — HOSPITAL ENCOUNTER (EMERGENCY)
Facility: HOSPITAL | Age: 74
Discharge: HOME | End: 2024-10-28
Attending: EMERGENCY MEDICINE
Payer: COMMERCIAL

## 2024-10-28 VITALS
SYSTOLIC BLOOD PRESSURE: 111 MMHG | TEMPERATURE: 98.6 F | RESPIRATION RATE: 16 BRPM | HEART RATE: 66 BPM | OXYGEN SATURATION: 94 % | WEIGHT: 125 LBS | BODY MASS INDEX: 22.14 KG/M2 | DIASTOLIC BLOOD PRESSURE: 57 MMHG

## 2024-10-28 DIAGNOSIS — I10 HYPERTENSION: Primary | ICD-10-CM

## 2024-10-28 DIAGNOSIS — F41.9 ANXIETY: ICD-10-CM

## 2024-10-28 LAB
ALBUMIN SERPL BCG-MCNC: 3.8 G/DL (ref 3.5–5)
ALP SERPL-CCNC: 82 U/L (ref 34–104)
ALT SERPL W P-5'-P-CCNC: 9 U/L (ref 7–52)
ANION GAP SERPL CALCULATED.3IONS-SCNC: 7 MMOL/L (ref 4–13)
AST SERPL W P-5'-P-CCNC: 15 U/L (ref 13–39)
ATRIAL RATE: 99 BPM
BASOPHILS # BLD AUTO: 0.07 THOUSANDS/ΜL (ref 0–0.1)
BASOPHILS NFR BLD AUTO: 1 % (ref 0–1)
BILIRUB SERPL-MCNC: 0.62 MG/DL (ref 0.2–1)
BUN SERPL-MCNC: 15 MG/DL (ref 5–25)
CALCIUM SERPL-MCNC: 9.3 MG/DL (ref 8.4–10.2)
CHLORIDE SERPL-SCNC: 106 MMOL/L (ref 96–108)
CO2 SERPL-SCNC: 25 MMOL/L (ref 21–32)
CREAT SERPL-MCNC: 0.68 MG/DL (ref 0.6–1.3)
EOSINOPHIL # BLD AUTO: 0.03 THOUSAND/ΜL (ref 0–0.61)
EOSINOPHIL NFR BLD AUTO: 0 % (ref 0–6)
ERYTHROCYTE [DISTWIDTH] IN BLOOD BY AUTOMATED COUNT: 14.5 % (ref 11.6–15.1)
GFR SERPL CREATININE-BSD FRML MDRD: 86 ML/MIN/1.73SQ M
GLUCOSE SERPL-MCNC: 116 MG/DL (ref 65–140)
GLUCOSE SERPL-MCNC: 96 MG/DL (ref 65–140)
HCT VFR BLD AUTO: 43.7 % (ref 34.8–46.1)
HGB BLD-MCNC: 14 G/DL (ref 11.5–15.4)
IMM GRANULOCYTES # BLD AUTO: 0.02 THOUSAND/UL (ref 0–0.2)
IMM GRANULOCYTES NFR BLD AUTO: 0 % (ref 0–2)
LYMPHOCYTES # BLD AUTO: 0.91 THOUSANDS/ΜL (ref 0.6–4.47)
LYMPHOCYTES NFR BLD AUTO: 9 % (ref 14–44)
MCH RBC QN AUTO: 29.7 PG (ref 26.8–34.3)
MCHC RBC AUTO-ENTMCNC: 32 G/DL (ref 31.4–37.4)
MCV RBC AUTO: 93 FL (ref 82–98)
MONOCYTES # BLD AUTO: 0.7 THOUSAND/ΜL (ref 0.17–1.22)
MONOCYTES NFR BLD AUTO: 7 % (ref 4–12)
NEUTROPHILS # BLD AUTO: 8.34 THOUSANDS/ΜL (ref 1.85–7.62)
NEUTS SEG NFR BLD AUTO: 83 % (ref 43–75)
NRBC BLD AUTO-RTO: 0 /100 WBCS
P AXIS: 63 DEGREES
PLATELET # BLD AUTO: 409 THOUSANDS/UL (ref 149–390)
PMV BLD AUTO: 9.3 FL (ref 8.9–12.7)
POTASSIUM SERPL-SCNC: 3.8 MMOL/L (ref 3.5–5.3)
PR INTERVAL: 158 MS
PROT SERPL-MCNC: 6.9 G/DL (ref 6.4–8.4)
QRS AXIS: -32 DEGREES
QRSD INTERVAL: 90 MS
QT INTERVAL: 354 MS
QTC INTERVAL: 454 MS
RBC # BLD AUTO: 4.72 MILLION/UL (ref 3.81–5.12)
SODIUM SERPL-SCNC: 138 MMOL/L (ref 135–147)
T WAVE AXIS: 57 DEGREES
VENTRICULAR RATE: 99 BPM
WBC # BLD AUTO: 10.07 THOUSAND/UL (ref 4.31–10.16)

## 2024-10-28 PROCEDURE — 93005 ELECTROCARDIOGRAM TRACING: CPT

## 2024-10-28 PROCEDURE — 85025 COMPLETE CBC W/AUTO DIFF WBC: CPT | Performed by: EMERGENCY MEDICINE

## 2024-10-28 PROCEDURE — 36415 COLL VENOUS BLD VENIPUNCTURE: CPT | Performed by: EMERGENCY MEDICINE

## 2024-10-28 PROCEDURE — 99284 EMERGENCY DEPT VISIT MOD MDM: CPT | Performed by: EMERGENCY MEDICINE

## 2024-10-28 PROCEDURE — 82948 REAGENT STRIP/BLOOD GLUCOSE: CPT

## 2024-10-28 PROCEDURE — 99284 EMERGENCY DEPT VISIT MOD MDM: CPT

## 2024-10-28 PROCEDURE — 80053 COMPREHEN METABOLIC PANEL: CPT | Performed by: EMERGENCY MEDICINE

## 2024-10-28 RX ORDER — NICOTINE 21 MG/24HR
21 PATCH, TRANSDERMAL 24 HOURS TRANSDERMAL ONCE
Status: DISCONTINUED | OUTPATIENT
Start: 2024-10-28 | End: 2024-10-28 | Stop reason: HOSPADM

## 2024-10-28 RX ORDER — LORAZEPAM 1 MG/1
1 TABLET ORAL ONCE
Status: COMPLETED | OUTPATIENT
Start: 2024-10-28 | End: 2024-10-28

## 2024-10-28 RX ADMIN — LORAZEPAM 1 MG: 1 TABLET ORAL at 07:09

## 2024-10-28 NOTE — ED PROVIDER NOTES
Time reflects when diagnosis was documented in both MDM as applicable and the Disposition within this note       Time User Action Codes Description Comment    10/28/2024  9:10 AM Iván Rucker Add [I10] Hypertension     10/28/2024  9:10 AM Iván Rucker Add [F41.9] Anxiety           ED Disposition       ED Disposition   Discharge    Condition   Stable    Date/Time   Mon Oct 28, 2024  9:10 AM    Comment   Bea Perez discharge to home/self care.                   Assessment & Plan       Medical Decision Making  0648: Patient appears anxious, vital signs reviewed.  The patient is brought in due to her hypertension.  The patient has recently stopped her antihypertensives due to side effects.  Patient is also quitting nicotine cold turkey, stopped Remeron due to its side effects as well.  The patient has an appointment with her PCP today at 3:45 PM to discussed these issues.  Nonfocal neurological exam, mild tremulous movements.  No symptomatology or signs to suggest endorgan damage.  EKG nonischemic.  Plan to complete basic labs.    0800: Labs reviewed.  The patient feels much improved with above care.  Her tremor has resolved.  Her anxious mood has diminished.  Family now tells me that she was on Klonopin routinely, she had to be weaned off of this medication.  The patient will follow-up closely with her PCP today as scheduled.    Amount and/or Complexity of Data Reviewed  Labs: ordered.  ECG/medicine tests: ordered and independent interpretation performed.     Details: Normal sinus rhythm 99 bpm, left axis deviation, minimal voltage criteria for LVH, no acute ischemia.    Risk  OTC drugs.  Prescription drug management.             Medications   LORazepam (ATIVAN) tablet 1 mg (1 mg Oral Given 10/28/24 0709)       ED Risk Strat Scores                           SBIRT 20yo+      Flowsheet Row Most Recent Value   Initial Alcohol Screen: US AUDIT-C     1. How often do you have a drink containing alcohol? 0 Filed at:  10/28/2024 0654   2. How many drinks containing alcohol do you have on a typical day you are drinking?  0 Filed at: 10/28/2024 0654   3a. Male UNDER 65: How often do you have five or more drinks on one occasion? 0 Filed at: 10/28/2024 0654   3b. FEMALE Any Age, or MALE 65+: How often do you have 4 or more drinks on one occassion? 0 Filed at: 10/28/2024 0654   Audit-C Score 0 Filed at: 10/28/2024 0654   SARAH: How many times in the past year have you...    Used an illegal drug or used a prescription medication for non-medical reasons? Never Filed at: 10/28/2024 0654                            History of Present Illness       Chief Complaint   Patient presents with    Hypertension     Patient arrived for increased BP systolic 160s at home. Recently weaned off of amlodipine.Patient has been shaky for the past few days.       Past Medical History:   Diagnosis Date    Depression     Esophageal stricture     History of gastroesophageal reflux (GERD)     Iron deficiency anemia     Meniere disease       Past Surgical History:   Procedure Laterality Date    COLONOSCOPY  2012    GALLBLADDER SURGERY      HYSTERECTOMY      OTHER SURGICAL HISTORY      Endoscopy of stricture 2010, 2012, 5/2014 - Esophageal ulcer; 7/2014 - Esophageal ulcer; 10/2015 (Dr. Jones) - Esophageal stricture; 10/2016; 11/2017 - Stricture      Family History   Problem Relation Age of Onset    Stroke Mother     Hypertension Mother     Emphysema Father       Social History     Tobacco Use    Smoking status: Some Days     Types: Cigarettes    Smokeless tobacco: Never   Vaping Use    Vaping status: Never Used   Substance Use Topics    Alcohol use: Not Currently     Comment: Denies alcohol use - As per [facility]     Drug use: Not Currently      E-Cigarette/Vaping    E-Cigarette Use Never User       E-Cigarette/Vaping Substances      I have reviewed and agree with the history as documented.       History provided by:  Medical records, patient, spouse and relative  (daughter)  Hypertension  Severity:  Moderate  Onset quality:  Gradual  Duration:  3 hours  Timing:  Constant  Progression:  Unchanged  Chronicity:  Chronic  Time since last dose of antihypertensive:  1 week  Notable PTA blood pressures:  168/80  Context comment:  The patient has a history of hypertension, anxiety, forgetfulness and possible early dementia that she has been evaluated for.  Recently stopped her Norvasc due to side effects.  Patient stopped smoking recently.  Patient weaned off Remeron due to SE.  Relieved by:  Nothing  Worsened by:  Nothing  Ineffective treatments:  None tried  Associated symptoms: anxiety    Associated symptoms: no abdominal pain, no chest pain, no confusion, no dizziness, no ear pain, no fatigue, no fever, no headaches, no hematuria, no nausea, no palpitations, no shortness of breath, not vomiting and no weakness    Risk factors: family hx of HTN        Review of Systems   Constitutional:  Negative for chills, fatigue and fever.   HENT:  Positive for dental problem. Negative for ear discharge, ear pain, rhinorrhea and sore throat.    Eyes:  Negative for pain and visual disturbance.   Respiratory:  Negative for cough and shortness of breath.    Cardiovascular:  Negative for chest pain and palpitations.   Gastrointestinal:  Negative for abdominal pain, diarrhea, nausea and vomiting.   Endocrine: Negative for polydipsia, polyphagia and polyuria.   Genitourinary:  Negative for difficulty urinating, dysuria, flank pain and hematuria.   Musculoskeletal:  Negative for arthralgias and back pain.   Skin:  Negative for color change and rash.   Allergic/Immunologic: Negative for immunocompromised state.   Neurological:  Negative for dizziness, seizures, syncope, weakness and headaches.   Psychiatric/Behavioral:  Positive for sleep disturbance. Negative for agitation, behavioral problems, confusion, decreased concentration, hallucinations, self-injury and suicidal ideas. The patient is  nervous/anxious.    All other systems reviewed and are negative.          Objective       ED Triage Vitals   Temperature Pulse Blood Pressure Respirations SpO2 Patient Position - Orthostatic VS   10/28/24 0713 10/28/24 0653 10/28/24 0653 10/28/24 0653 10/28/24 0653 10/28/24 0653   98.6 °F (37 °C) 89 153/74 17 98 % Sitting      Temp Source Heart Rate Source BP Location FiO2 (%) Pain Score    10/28/24 0713 10/28/24 0653 10/28/24 0715 -- 10/28/24 0653    Temporal Monitor Right arm  No Pain      Vitals      Date and Time Temp Pulse SpO2 Resp BP Pain Score FACES Pain Rating User   10/28/24 0915 -- 66 94 % 16 111/57 -- -- AS   10/28/24 0830 -- 67 95 % 18 115/58 -- -- AS   10/28/24 0800 -- 66 94 % 18 113/53 -- -- AS   10/28/24 0715 -- 80 96 % 20 130/67 -- -- AS   10/28/24 0713 98.6 °F (37 °C) -- -- -- -- -- -- AS   10/28/24 0653 -- 89 98 % 17 153/74 No Pain -- AS            Physical Exam  Vitals and nursing note reviewed.   Constitutional:       General: She is not in acute distress.     Appearance: Normal appearance. She is not ill-appearing, toxic-appearing or diaphoretic.   HENT:      Head: Normocephalic and atraumatic.      Nose: Nose normal. No congestion or rhinorrhea.      Mouth/Throat:      Mouth: Mucous membranes are moist.      Pharynx: Oropharynx is clear. No oropharyngeal exudate or posterior oropharyngeal erythema.      Comments: There is some mild gingival swelling left upper teeth, no tenderness to percussion of any teeth.  There is a bridge on the upper teeth.  Eyes:      General:         Right eye: No discharge.         Left eye: No discharge.      Extraocular Movements: Extraocular movements intact.      Conjunctiva/sclera: Conjunctivae normal.      Pupils: Pupils are equal, round, and reactive to light.   Cardiovascular:      Rate and Rhythm: Normal rate and regular rhythm.      Pulses: Normal pulses.      Heart sounds: Normal heart sounds. No murmur heard.     No gallop.   Pulmonary:      Effort:  Pulmonary effort is normal. No respiratory distress.      Breath sounds: Normal breath sounds. No stridor. No wheezing, rhonchi or rales.   Chest:      Chest wall: No tenderness.   Abdominal:      General: Bowel sounds are normal. There is no distension.      Palpations: Abdomen is soft. There is no mass.      Tenderness: There is no abdominal tenderness. There is no right CVA tenderness, left CVA tenderness, guarding or rebound.      Hernia: No hernia is present.   Musculoskeletal:         General: Normal range of motion.      Cervical back: Normal range of motion and neck supple.   Skin:     General: Skin is warm and dry.      Capillary Refill: Capillary refill takes less than 2 seconds.   Neurological:      General: No focal deficit present.      Mental Status: She is alert and oriented to person, place, and time.      Cranial Nerves: No cranial nerve deficit.      Sensory: No sensory deficit.      Motor: No weakness.      Gait: Gait normal.      Comments: Tremulous   Psychiatric:         Behavior: Behavior normal.         Thought Content: Thought content normal.         Judgment: Judgment normal.      Comments: Anxious mood         Results Reviewed       Procedure Component Value Units Date/Time    Comprehensive metabolic panel [[id number]] Collected: 10/28/24 0706    Lab Status: Final result Specimen: Blood from Arm, Right Updated: 10/28/24 0733     Sodium 138 mmol/L      Potassium 3.8 mmol/L      Chloride 106 mmol/L      CO2 25 mmol/L      ANION GAP 7 mmol/L      BUN 15 mg/dL      Creatinine 0.68 mg/dL      Glucose 116 mg/dL      Calcium 9.3 mg/dL      AST 15 U/L      ALT 9 U/L      Alkaline Phosphatase 82 U/L      Total Protein 6.9 g/dL      Albumin 3.8 g/dL      Total Bilirubin 0.62 mg/dL      eGFR 86 ml/min/1.73sq m     Narrative:      National Kid[employer] Foundation guidelines for Chronic Kidney Disease (CKD):     Stage 1 with normal or high GFR (GFR > 90 mL/min/1.73 square meters)    Stage 2 Mild CKD  (GFR = 60-89 mL/min/1.73 square meters)    Stage 3A Moderate CKD (GFR = 45-59 mL/min/1.73 square meters)    Stage 3B Moderate CKD (GFR = 30-44 mL/min/1.73 square meters)    Stage 4 Severe CKD (GFR = 15-29 mL/min/1.73 square meters)    Stage 5 End Stage CKD (GFR <15 mL/min/1.73 square meters)  Note: GFR calculation is accurate only with a steady state creatinine    CBC and differential [[id number]]  (Abnormal) Collected: 10/28/24 0706    Lab Status: Final result Specimen: Blood from Arm, Right Updated: 10/28/24 0711     WBC 10.07 Thousand/uL      RBC 4.72 Million/uL      Hemoglobin 14.0 g/dL      Hematocrit 43.7 %      MCV 93 fL      MCH 29.7 pg      MCHC 32.0 g/dL      RDW 14.5 %      MPV 9.3 fL      Platelets 409 Thousands/uL      nRBC 0 /100 WBCs      Segmented % 83 %      Immature Grans % 0 %      Lymphocytes % 9 %      Monocytes % 7 %      Eosinophils Relative 0 %      Basophils Relative 1 %      Absolute Neutrophils 8.34 Thousands/µL      Absolute Immature Grans 0.02 Thousand/uL      Absolute Lymphocytes 0.91 Thousands/µL      Absolute Monocytes 0.70 Thousand/µL      Eosinophils Absolute 0.03 Thousand/µL      Basophils Absolute 0.07 Thousands/µL     Fingerstick Glucose (POCT) [[id number]]  (Normal) Collected: 10/28/24 0653    Lab Status: Final result Specimen: Blood Updated: 10/28/24 0657     POC Glucose 96 mg/dl             No orders to display       Procedures    ED Medication and Procedure Management   Prior to Admission Medications   Prescriptions Last Dose Informant Patient Reported? Taking?   LORazepam (Ativan) 0.5 mg tablet   No No   Sig: Take 1 tablet (0.5 mg total) by mouth 3 (three) times a day as needed for anxiety or sleep for up to 10 days   acetaminophen (TYLENOL) 325 mg tablet   Yes No   Sig: Take 650 mg by mouth every 6 (six) hours as needed for mild pain   albuterol (ProAir HFA) 90 mcg/act inhaler   No No   Sig: Inhale 2 puffs every 6 (six) hours as needed for wheezing or shortness of breath    amLODIPine (NORVASC) 5 mg tablet   No No   Sig: Take 1 tablet (5 mg total) by mouth daily for 20 days   citalopram (CeleXA) 20 mg tablet   No No   Sig: Take 1 tablet daily   ferrous sulfate 325 (65 Fe) mg tablet   Yes No   Sig: Take 325 mg by mouth daily with breakfast   guaiFENesin (MUCINEX PO)   Yes No   Sig: Take by mouth   Patient not taking: Reported on 2/28/2024   meclizine (ANTIVERT) 25 mg tablet   No No   Sig: Take 1 tablet (25 mg total) by mouth 3 (three) times a day as needed for dizziness   mometasone (ELOCON) 0.1 % cream   No No   Sig: Apply topically daily for 5 days   Patient not taking: Reported on 2/28/2024   omeprazole (PriLOSEC) 20 mg delayed release capsule   No No   Sig: Take 1 capsule (20 mg total) by mouth daily   Patient not taking: Reported on 2/28/2024      Facility-Administered Medications: None     Discharge Medication List as of 10/28/2024  9:10 AM        CONTINUE these medications which have NOT CHANGED    Details   acetaminophen (TYLENOL) 325 mg tablet Take 650 mg by mouth every 6 (six) hours as needed for mild pain, Historical Med      albuterol (ProAir HFA) 90 mcg/act inhaler Inhale 2 puffs every 6 (six) hours as needed for wheezing or shortness of breath, Starting Wed 2/28/2024, Normal      amLODIPine (NORVASC) 5 mg tablet Take 1 tablet (5 mg total) by mouth daily for 20 days, Starting Sat 7/13/2024, Until Fri 8/2/2024, Normal      citalopram (CeleXA) 20 mg tablet Take 1 tablet daily, Normal      ferrous sulfate 325 (65 Fe) mg tablet Take 325 mg by mouth daily with breakfast, Historical Med      guaiFENesin (MUCINEX PO) Take by mouth, Historical Med      LORazepam (Ativan) 0.5 mg tablet Take 1 tablet (0.5 mg total) by mouth 3 (three) times a day as needed for anxiety or sleep for up to 10 days, Starting Wed 8/28/2024, Until Sat 9/7/2024 at 2359, Normal      meclizine (ANTIVERT) 25 mg tablet Take 1 tablet (25 mg total) by mouth 3 (three) times a day as needed for dizziness, Starting  Sat 7/13/2024, Normal      mometasone (ELOCON) 0.1 % cream Apply topically daily for 5 days, Starting Sun 7/22/2018, Until Wed 1/31/2024, Normal      omeprazole (PriLOSEC) 20 mg delayed release capsule Take 1 capsule (20 mg total) by mouth daily, Starting Mon 5/24/2021, Normal           No discharge procedures on file.  ED SEPSIS DOCUMENTATION   Time reflects when diagnosis was documented in both MDM as applicable and the Disposition within this note       Time User Action Codes Description Comment    10/28/2024  9:10 AM Iván Rucker [I10] Hypertension     10/28/2024  9:10 AM Iván Rucker [F41.9] Anxiety                  Iván Rucker MD  10/28/24 8423

## 2024-10-29 ENCOUNTER — TELEPHONE (OUTPATIENT)
Age: 74
End: 2024-10-29

## 2024-10-31 ENCOUNTER — TELEPHONE (OUTPATIENT)
Age: 74
End: 2024-10-31

## 2024-10-31 NOTE — TELEPHONE ENCOUNTER
Contacted Pt. in regards to ROUTINE Referral, writer confirmed name and  and disclosed reason for call and pt. Hung up.

## 2025-02-11 ENCOUNTER — OFFICE VISIT (OUTPATIENT)
Dept: URGENT CARE | Facility: CLINIC | Age: 75
End: 2025-02-11
Payer: MEDICARE

## 2025-02-11 ENCOUNTER — APPOINTMENT (EMERGENCY)
Dept: CT IMAGING | Facility: HOSPITAL | Age: 75
DRG: 871 | End: 2025-02-11
Payer: MEDICARE

## 2025-02-11 ENCOUNTER — HOSPITAL ENCOUNTER (INPATIENT)
Facility: HOSPITAL | Age: 75
LOS: 9 days | Discharge: HOME HEALTH | DRG: 871 | End: 2025-02-20
Attending: EMERGENCY MEDICINE | Admitting: STUDENT IN AN ORGANIZED HEALTH CARE EDUCATION/TRAINING PROGRAM
Payer: MEDICARE

## 2025-02-11 ENCOUNTER — APPOINTMENT (EMERGENCY)
Dept: RADIOLOGY | Facility: HOSPITAL | Age: 75
DRG: 871 | End: 2025-02-11
Payer: MEDICARE

## 2025-02-11 VITALS
SYSTOLIC BLOOD PRESSURE: 115 MMHG | BODY MASS INDEX: 22.89 KG/M2 | HEART RATE: 120 BPM | RESPIRATION RATE: 22 BRPM | HEIGHT: 63 IN | DIASTOLIC BLOOD PRESSURE: 55 MMHG | TEMPERATURE: 99.3 F | OXYGEN SATURATION: 89 % | WEIGHT: 129.2 LBS

## 2025-02-11 DIAGNOSIS — R06.2 WHEEZING: ICD-10-CM

## 2025-02-11 DIAGNOSIS — J18.9 PNEUMONIA: Primary | ICD-10-CM

## 2025-02-11 DIAGNOSIS — I51.81 TAKOTSUBO CARDIOMYOPATHY: ICD-10-CM

## 2025-02-11 DIAGNOSIS — A41.9 SEPSIS (HCC): ICD-10-CM

## 2025-02-11 DIAGNOSIS — J81.0 FLASH PULMONARY EDEMA (HCC): ICD-10-CM

## 2025-02-11 DIAGNOSIS — R09.02 HYPOXIA: ICD-10-CM

## 2025-02-11 DIAGNOSIS — R09.02 HYPOXIA: Primary | ICD-10-CM

## 2025-02-11 DIAGNOSIS — R06.02 SHORTNESS OF BREATH: ICD-10-CM

## 2025-02-11 PROBLEM — R65.20 SEPSIS WITH ACUTE HYPOXIC RESPIRATORY FAILURE (HCC): Status: ACTIVE | Noted: 2025-02-11

## 2025-02-11 PROBLEM — J96.01 ACUTE HYPOXIC RESPIRATORY FAILURE (HCC): Status: ACTIVE | Noted: 2025-02-11

## 2025-02-11 PROBLEM — J44.1 COPD WITH ACUTE EXACERBATION (HCC): Status: ACTIVE | Noted: 2025-02-11

## 2025-02-11 PROBLEM — J44.1 COPD WITH ACUTE EXACERBATION (HCC): Status: RESOLVED | Noted: 2025-02-11 | Resolved: 2025-02-11

## 2025-02-11 PROBLEM — I10 HYPERTENSION, ESSENTIAL: Status: ACTIVE | Noted: 2024-07-15

## 2025-02-11 PROBLEM — J96.01 SEPSIS WITH ACUTE HYPOXIC RESPIRATORY FAILURE (HCC): Status: ACTIVE | Noted: 2025-02-11

## 2025-02-11 LAB
ANION GAP SERPL CALCULATED.3IONS-SCNC: 9 MMOL/L (ref 4–13)
BASOPHILS # BLD AUTO: 0.06 THOUSANDS/ΜL (ref 0–0.1)
BASOPHILS NFR BLD AUTO: 0 % (ref 0–1)
BUN SERPL-MCNC: 19 MG/DL (ref 5–25)
CALCIUM SERPL-MCNC: 8.7 MG/DL (ref 8.4–10.2)
CHLORIDE SERPL-SCNC: 103 MMOL/L (ref 96–108)
CO2 SERPL-SCNC: 25 MMOL/L (ref 21–32)
CREAT SERPL-MCNC: 1.09 MG/DL (ref 0.6–1.3)
EOSINOPHIL # BLD AUTO: 0.03 THOUSAND/ΜL (ref 0–0.61)
EOSINOPHIL NFR BLD AUTO: 0 % (ref 0–6)
ERYTHROCYTE [DISTWIDTH] IN BLOOD BY AUTOMATED COUNT: 15.5 % (ref 11.6–15.1)
FLUAV AG UPPER RESP QL IA.RAPID: NEGATIVE
FLUBV AG UPPER RESP QL IA.RAPID: NEGATIVE
GFR SERPL CREATININE-BSD FRML MDRD: 50 ML/MIN/1.73SQ M
GLUCOSE SERPL-MCNC: 127 MG/DL (ref 65–140)
HCT VFR BLD AUTO: 35.7 % (ref 34.8–46.1)
HGB BLD-MCNC: 11.9 G/DL (ref 11.5–15.4)
IMM GRANULOCYTES # BLD AUTO: 0.12 THOUSAND/UL (ref 0–0.2)
IMM GRANULOCYTES NFR BLD AUTO: 1 % (ref 0–2)
LACTATE SERPL-SCNC: 1.8 MMOL/L (ref 0.5–2)
LYMPHOCYTES # BLD AUTO: 0.59 THOUSANDS/ΜL (ref 0.6–4.47)
LYMPHOCYTES NFR BLD AUTO: 4 % (ref 14–44)
MCH RBC QN AUTO: 29.8 PG (ref 26.8–34.3)
MCHC RBC AUTO-ENTMCNC: 33.3 G/DL (ref 31.4–37.4)
MCV RBC AUTO: 90 FL (ref 82–98)
MONOCYTES # BLD AUTO: 1.43 THOUSAND/ΜL (ref 0.17–1.22)
MONOCYTES NFR BLD AUTO: 8 % (ref 4–12)
NEUTROPHILS # BLD AUTO: 14.86 THOUSANDS/ΜL (ref 1.85–7.62)
NEUTS SEG NFR BLD AUTO: 87 % (ref 43–75)
NRBC BLD AUTO-RTO: 0 /100 WBCS
PLATELET # BLD AUTO: 330 THOUSANDS/UL (ref 149–390)
PMV BLD AUTO: 9.9 FL (ref 8.9–12.7)
POTASSIUM SERPL-SCNC: 3.5 MMOL/L (ref 3.5–5.3)
PROCALCITONIN SERPL-MCNC: 1.01 NG/ML
RBC # BLD AUTO: 3.99 MILLION/UL (ref 3.81–5.12)
SARS-COV+SARS-COV-2 AG RESP QL IA.RAPID: NEGATIVE
SODIUM SERPL-SCNC: 137 MMOL/L (ref 135–147)
WBC # BLD AUTO: 17.09 THOUSAND/UL (ref 4.31–10.16)

## 2025-02-11 PROCEDURE — 36415 COLL VENOUS BLD VENIPUNCTURE: CPT | Performed by: EMERGENCY MEDICINE

## 2025-02-11 PROCEDURE — 93005 ELECTROCARDIOGRAM TRACING: CPT

## 2025-02-11 PROCEDURE — 94664 DEMO&/EVAL PT USE INHALER: CPT

## 2025-02-11 PROCEDURE — 99285 EMERGENCY DEPT VISIT HI MDM: CPT | Performed by: EMERGENCY MEDICINE

## 2025-02-11 PROCEDURE — 87040 BLOOD CULTURE FOR BACTERIA: CPT | Performed by: EMERGENCY MEDICINE

## 2025-02-11 PROCEDURE — 94640 AIRWAY INHALATION TREATMENT: CPT

## 2025-02-11 PROCEDURE — 94644 CONT INHLJ TX 1ST HOUR: CPT

## 2025-02-11 PROCEDURE — 96375 TX/PRO/DX INJ NEW DRUG ADDON: CPT

## 2025-02-11 PROCEDURE — 87804 INFLUENZA ASSAY W/OPTIC: CPT | Performed by: EMERGENCY MEDICINE

## 2025-02-11 PROCEDURE — 96365 THER/PROPH/DIAG IV INF INIT: CPT

## 2025-02-11 PROCEDURE — 94760 N-INVAS EAR/PLS OXIMETRY 1: CPT

## 2025-02-11 PROCEDURE — 71275 CT ANGIOGRAPHY CHEST: CPT

## 2025-02-11 PROCEDURE — 84145 PROCALCITONIN (PCT): CPT | Performed by: EMERGENCY MEDICINE

## 2025-02-11 PROCEDURE — 87811 SARS-COV-2 COVID19 W/OPTIC: CPT | Performed by: EMERGENCY MEDICINE

## 2025-02-11 PROCEDURE — 99223 1ST HOSP IP/OBS HIGH 75: CPT | Performed by: FAMILY MEDICINE

## 2025-02-11 PROCEDURE — 99213 OFFICE O/P EST LOW 20 MIN: CPT

## 2025-02-11 PROCEDURE — 83605 ASSAY OF LACTIC ACID: CPT | Performed by: EMERGENCY MEDICINE

## 2025-02-11 PROCEDURE — 80048 BASIC METABOLIC PNL TOTAL CA: CPT | Performed by: EMERGENCY MEDICINE

## 2025-02-11 PROCEDURE — 71045 X-RAY EXAM CHEST 1 VIEW: CPT

## 2025-02-11 PROCEDURE — 85025 COMPLETE CBC W/AUTO DIFF WBC: CPT | Performed by: EMERGENCY MEDICINE

## 2025-02-11 PROCEDURE — 99285 EMERGENCY DEPT VISIT HI MDM: CPT

## 2025-02-11 PROCEDURE — G0463 HOSPITAL OUTPT CLINIC VISIT: HCPCS

## 2025-02-11 RX ORDER — DIVALPROEX SODIUM 125 MG/1
250 CAPSULE, COATED PELLETS ORAL 2 TIMES DAILY
COMMUNITY
Start: 2025-01-23 | End: 2025-02-22

## 2025-02-11 RX ORDER — CEFTRIAXONE 1 G/50ML
1000 INJECTION, SOLUTION INTRAVENOUS EVERY 24 HOURS
Status: DISCONTINUED | OUTPATIENT
Start: 2025-02-11 | End: 2025-02-11

## 2025-02-11 RX ORDER — DULOXETIN HYDROCHLORIDE 30 MG/1
30 CAPSULE, DELAYED RELEASE ORAL DAILY
Status: DISCONTINUED | OUTPATIENT
Start: 2025-02-12 | End: 2025-02-20 | Stop reason: HOSPADM

## 2025-02-11 RX ORDER — GUAIFENESIN 600 MG/1
600 TABLET, EXTENDED RELEASE ORAL 2 TIMES DAILY
Status: DISCONTINUED | OUTPATIENT
Start: 2025-02-11 | End: 2025-02-20 | Stop reason: HOSPADM

## 2025-02-11 RX ORDER — OLANZAPINE 2.5 MG/1
7.5 TABLET, FILM COATED ORAL
Status: DISCONTINUED | OUTPATIENT
Start: 2025-02-12 | End: 2025-02-20 | Stop reason: HOSPADM

## 2025-02-11 RX ORDER — OLANZAPINE 7.5 MG/1
7.5 TABLET, FILM COATED ORAL
COMMUNITY
Start: 2025-01-23 | End: 2025-02-22

## 2025-02-11 RX ORDER — METHYLPREDNISOLONE SODIUM SUCCINATE 40 MG/ML
40 INJECTION, POWDER, LYOPHILIZED, FOR SOLUTION INTRAMUSCULAR; INTRAVENOUS EVERY 8 HOURS SCHEDULED
Status: DISCONTINUED | OUTPATIENT
Start: 2025-02-11 | End: 2025-02-15

## 2025-02-11 RX ORDER — METHYLPREDNISOLONE SODIUM SUCCINATE 125 MG/2ML
125 INJECTION, POWDER, LYOPHILIZED, FOR SOLUTION INTRAMUSCULAR; INTRAVENOUS ONCE
Status: COMPLETED | OUTPATIENT
Start: 2025-02-11 | End: 2025-02-11

## 2025-02-11 RX ORDER — DULOXETIN HYDROCHLORIDE 30 MG/1
1 CAPSULE, DELAYED RELEASE ORAL DAILY
COMMUNITY
Start: 2025-01-23

## 2025-02-11 RX ORDER — ALBUTEROL SULFATE 5 MG/ML
10 SOLUTION RESPIRATORY (INHALATION) ONCE
Status: COMPLETED | OUTPATIENT
Start: 2025-02-11 | End: 2025-02-11

## 2025-02-11 RX ORDER — LEVALBUTEROL INHALATION SOLUTION 1.25 MG/3ML
1.25 SOLUTION RESPIRATORY (INHALATION)
Status: DISCONTINUED | OUTPATIENT
Start: 2025-02-11 | End: 2025-02-20 | Stop reason: HOSPADM

## 2025-02-11 RX ORDER — CEFTRIAXONE 2 G/50ML
2000 INJECTION, SOLUTION INTRAVENOUS ONCE
Status: COMPLETED | OUTPATIENT
Start: 2025-02-11 | End: 2025-02-11

## 2025-02-11 RX ORDER — DIVALPROEX SODIUM 125 MG/1
250 CAPSULE, COATED PELLETS ORAL 2 TIMES DAILY
Status: DISCONTINUED | OUTPATIENT
Start: 2025-02-12 | End: 2025-02-20 | Stop reason: HOSPADM

## 2025-02-11 RX ORDER — CEFTRIAXONE 1 G/50ML
1000 INJECTION, SOLUTION INTRAVENOUS EVERY 24 HOURS
Status: COMPLETED | OUTPATIENT
Start: 2025-02-12 | End: 2025-02-18

## 2025-02-11 RX ORDER — DULOXETIN HYDROCHLORIDE 30 MG/1
30 CAPSULE, DELAYED RELEASE ORAL DAILY
Status: DISCONTINUED | OUTPATIENT
Start: 2025-02-12 | End: 2025-02-11

## 2025-02-11 RX ORDER — ACETAMINOPHEN 325 MG/1
650 TABLET ORAL EVERY 6 HOURS PRN
Status: DISCONTINUED | OUTPATIENT
Start: 2025-02-11 | End: 2025-02-20 | Stop reason: HOSPADM

## 2025-02-11 RX ORDER — BUDESONIDE 0.5 MG/2ML
0.5 INHALANT ORAL
Status: DISCONTINUED | OUTPATIENT
Start: 2025-02-11 | End: 2025-02-20 | Stop reason: HOSPADM

## 2025-02-11 RX ORDER — ENOXAPARIN SODIUM 100 MG/ML
40 INJECTION SUBCUTANEOUS DAILY
Status: DISCONTINUED | OUTPATIENT
Start: 2025-02-12 | End: 2025-02-20 | Stop reason: HOSPADM

## 2025-02-11 RX ORDER — DIVALPROEX SODIUM 125 MG/1
250 CAPSULE, COATED PELLETS ORAL ONCE
Status: COMPLETED | OUTPATIENT
Start: 2025-02-11 | End: 2025-02-11

## 2025-02-11 RX ORDER — SODIUM CHLORIDE, SODIUM GLUCONATE, SODIUM ACETATE, POTASSIUM CHLORIDE, MAGNESIUM CHLORIDE, SODIUM PHOSPHATE, DIBASIC, AND POTASSIUM PHOSPHATE .53; .5; .37; .037; .03; .012; .00082 G/100ML; G/100ML; G/100ML; G/100ML; G/100ML; G/100ML; G/100ML
75 INJECTION, SOLUTION INTRAVENOUS CONTINUOUS
Status: DISCONTINUED | OUTPATIENT
Start: 2025-02-11 | End: 2025-02-12

## 2025-02-11 RX ORDER — SODIUM CHLORIDE FOR INHALATION 0.9 %
3 VIAL, NEBULIZER (ML) INHALATION
Status: DISCONTINUED | OUTPATIENT
Start: 2025-02-11 | End: 2025-02-12

## 2025-02-11 RX ORDER — OLANZAPINE 2.5 MG/1
7.5 TABLET, FILM COATED ORAL ONCE
Status: COMPLETED | OUTPATIENT
Start: 2025-02-11 | End: 2025-02-11

## 2025-02-11 RX ORDER — OLANZAPINE 2.5 MG/1
7.5 TABLET, FILM COATED ORAL
Status: DISCONTINUED | OUTPATIENT
Start: 2025-02-11 | End: 2025-02-11

## 2025-02-11 RX ORDER — SODIUM CHLORIDE FOR INHALATION 0.9 %
9 VIAL, NEBULIZER (ML) INHALATION ONCE
Status: COMPLETED | OUTPATIENT
Start: 2025-02-11 | End: 2025-02-11

## 2025-02-11 RX ADMIN — IOHEXOL 85 ML: 350 INJECTION, SOLUTION INTRAVENOUS at 20:04

## 2025-02-11 RX ADMIN — DIVALPROEX SODIUM 250 MG: 125 CAPSULE ORAL at 19:35

## 2025-02-11 RX ADMIN — CEFTRIAXONE 2000 MG: 2 INJECTION, SOLUTION INTRAVENOUS at 19:28

## 2025-02-11 RX ADMIN — SODIUM CHLORIDE 1000 ML: 0.9 INJECTION, SOLUTION INTRAVENOUS at 20:40

## 2025-02-11 RX ADMIN — SODIUM CHLORIDE 1000 ML: 0.9 INJECTION, SOLUTION INTRAVENOUS at 20:48

## 2025-02-11 RX ADMIN — ISODIUM CHLORIDE 9 ML: 0.03 SOLUTION RESPIRATORY (INHALATION) at 18:57

## 2025-02-11 RX ADMIN — METHYLPREDNISOLONE SODIUM SUCCINATE 40 MG: 40 INJECTION, POWDER, FOR SOLUTION INTRAMUSCULAR; INTRAVENOUS at 23:04

## 2025-02-11 RX ADMIN — ALBUTEROL SULFATE 10 MG: 2.5 SOLUTION RESPIRATORY (INHALATION) at 18:57

## 2025-02-11 RX ADMIN — OLANZAPINE 7.5 MG: 2.5 TABLET, FILM COATED ORAL at 19:32

## 2025-02-11 RX ADMIN — ACETAMINOPHEN 325MG 650 MG: 325 TABLET ORAL at 23:05

## 2025-02-11 RX ADMIN — GUAIFENESIN 600 MG: 600 TABLET ORAL at 23:05

## 2025-02-11 RX ADMIN — SODIUM CHLORIDE 1000 ML: 0.9 INJECTION, SOLUTION INTRAVENOUS at 19:27

## 2025-02-11 RX ADMIN — AZITHROMYCIN MONOHYDRATE 500 MG: 500 INJECTION, POWDER, LYOPHILIZED, FOR SOLUTION INTRAVENOUS at 23:13

## 2025-02-11 RX ADMIN — IPRATROPIUM BROMIDE 1 MG: 0.5 SOLUTION RESPIRATORY (INHALATION) at 18:57

## 2025-02-11 RX ADMIN — METHYLPREDNISOLONE SODIUM SUCCINATE 125 MG: 125 INJECTION, POWDER, FOR SOLUTION INTRAMUSCULAR; INTRAVENOUS at 18:27

## 2025-02-11 NOTE — PATIENT INSTRUCTIONS
Recommended patient proceed to ED for further evaluation of low oxygen. Discussed risks of delayed evaluation and intervention with serious etiology. Additionally discussed risks of personal transport vs transport via ambulance. Patient verbalized understanding.     Hypoxia on room air 87%.  Pt placed on 2 liters with improvement to 93%.  Pt has wheezing and rhonchi present with SOB ongoing for past 6 days.     Follow up with PCP in 3-5 days.  Proceed to  ER if symptoms worsen.    If tests are performed, our office will contact you with results only if changes need to made to the care plan discussed with you at the visit. You can review your full results on [facility].

## 2025-02-11 NOTE — PROGRESS NOTES
[facility]        NAME: Bea Perez is a 74 y.o. female  : 1950    MRN: [id number]  DATE: 2025  TIME: 5:27 PM    Assessment and Plan   Hypoxia [R09.02]  1. Hypoxia  Transfer to other facility      2. Shortness of breath  Transfer to other facility      3. Wheezing  Transfer to other facility        Patient and daughter were both offered ambulance transport due to hypoxia and need for oxygen but they declined.  Patient hypoxic at 87% on room air, will send to ED for higher level of care.  Full assessment deferred to ED.   Offered flu and COVID testing and chest x-ray but patient and daughter declined and will defer all further testing to ED.    Patient Instructions   Recommended patient proceed to ED for further evaluation of low oxygen. Discussed risks of delayed evaluation and intervention with serious etiology. Additionally discussed risks of personal transport vs transport via ambulance. Patient verbalized understanding.     Hypoxia on room air 87%.  Pt placed on 2 liters with improvement to 93%.  Pt has wheezing and rhonchi present with SOB ongoing for past 6 days.     Follow up with PCP in 3-5 days.  Proceed to  ER if symptoms worsen.    If tests are performed, our office will contact you with results only if changes need to made to the care plan discussed with you at the visit. You can review your full results on [facility].    Chief Complaint     Chief Complaint   Patient presents with    Cold Like Symptoms     Cough, chest congestion, ear ringing, wheezing, SOB - not improving-getting worse   Started: Wednesday   Has been taking mucinex DM          History of Present Illness       74-year-old female arrives with daughter reporting cough and congestion with wheezing and shortness of breath ongoing for the past 6 days.  Patient reports her  is at home sick with similar symptoms.  Patient denies any prior history of having lung problems.  Patient denies any  current chest pain.  Patient is concerned because she now feels herself wheezing especially when she is laying flat and has been trying to prop herself up at night without any relief.  Patient reports she has been taking over-the-counter Mucinex without any relief.  Patient is currently hypoxic at 87% on room air, and 2 L of oxygen was placed on the patient via nasal cannula.  Patient's oxygen saturation riki to 93% with 2 L of oxygen via nasal cannula.  Patient declined any further testing at urgent care and daughter will drive her to closest emergency department.  Patient and daughter were both offered ambulance transport due to hypoxia and need for oxygen but they declined.        Review of Systems   Review of Systems   Constitutional:  Positive for fatigue and fever.   HENT:  Positive for congestion and ear pain.    Respiratory:  Positive for cough, shortness of breath and wheezing.    Cardiovascular: Negative.    Gastrointestinal: Negative.    Musculoskeletal:  Positive for myalgias.         Current Medications       Current Outpatient Medications:     divalproex sodium (DEPAKOTE SPRINKLE) 125 MG capsule, Take 250 mg by mouth 2 (two) times a day, Disp: , Rfl:     DULoxetine (CYMBALTA) 30 mg delayed release capsule, Take 1 capsule by mouth in the morning, Disp: , Rfl:     OLANZapine (ZyPREXA) 7.5 mg tablet, Take 7.5 mg by mouth, Disp: , Rfl:     acetaminophen (TYLENOL) 325 mg tablet, Take 650 mg by mouth every 6 (six) hours as needed for mild pain (Patient not taking: Reported on 2/11/2025), Disp: , Rfl:     albuterol (ProAir HFA) 90 mcg/act inhaler, Inhale 2 puffs every 6 (six) hours as needed for wheezing or shortness of breath (Patient not taking: Reported on 2/11/2025), Disp: 8.5 g, Rfl: 0    amLODIPine (NORVASC) 5 mg tablet, Take 1 tablet (5 mg total) by mouth daily for 20 days (Patient not taking: Reported on 2/11/2025), Disp: 20 tablet, Rfl: 0    citalopram (CeleXA) 20 mg tablet, Take 1 tablet daily  (Patient not taking: Reported on 2/11/2025), Disp: 30 tablet, Rfl: 5    ferrous sulfate 325 (65 Fe) mg tablet, Take 325 mg by mouth daily with breakfast (Patient not taking: Reported on 2/11/2025), Disp: , Rfl:     guaiFENesin (MUCINEX PO), Take by mouth (Patient not taking: Reported on 2/11/2025), Disp: , Rfl:     LORazepam (Ativan) 0.5 mg tablet, Take 1 tablet (0.5 mg total) by mouth 3 (three) times a day as needed for anxiety or sleep for up to 10 days (Patient not taking: Reported on 2/11/2025), Disp: 15 tablet, Rfl: 0    meclizine (ANTIVERT) 25 mg tablet, Take 1 tablet (25 mg total) by mouth 3 (three) times a day as needed for dizziness (Patient not taking: Reported on 2/11/2025), Disp: 30 tablet, Rfl: 0    mometasone (ELOCON) 0.1 % cream, Apply topically daily for 5 days (Patient not taking: Reported on 2/11/2025), Disp: 30 g, Rfl: 0    omeprazole (PriLOSEC) 20 mg delayed release capsule, Take 1 capsule (20 mg total) by mouth daily (Patient not taking: Reported on 2/11/2025), Disp: 30 capsule, Rfl: 5    Current Allergies     Allergies as of 02/11/2025    (No Known Allergies)            The following portions of the patient's history were reviewed and updated as appropriate: allergies, current medications, past family history, past medical history, past social history, past surgical history and problem list.     Past Medical History:   Diagnosis Date    Depression     Esophageal stricture     History of gastroesophageal reflux (GERD)     Iron deficiency anemia     Meniere disease        Past Surgical History:   Procedure Laterality Date    COLONOSCOPY  2012    FL LUMBAR PUNCTURE DIAGNOSTIC  12/19/2024    GALLBLADDER SURGERY      HYSTERECTOMY      OTHER SURGICAL HISTORY      Endoscopy of stricture 2010, 2012, 5/2014 - Esophageal ulcer; 7/2014 - Esophageal ulcer; 10/2015 (Dr. Jones) - Esophageal stricture; 10/2016; 11/2017 - Stricture       Family History   Problem Relation Age of Onset    Stroke Mother      "Hypertension Mother     Emphysema Father          Medications have been verified.        Objective   /55   Pulse (!) 120   Temp 99.3 °F (37.4 °C)   Resp 22   Ht 5' 3\" (1.6 m)   Wt 58.6 kg (129 lb 3.2 oz)   SpO2 (!) 89%   BMI 22.89 kg/m²        Physical Exam     Physical Exam  Vitals and nursing note reviewed.   Constitutional:       General: She is not in acute distress.     Appearance: Normal appearance. She is ill-appearing.   HENT:      Head: Normocephalic.      Right Ear: External ear normal.      Left Ear: External ear normal.      Nose: Congestion present.   Eyes:      Pupils: Pupils are equal, round, and reactive to light.   Cardiovascular:      Rate and Rhythm: Regular rhythm. Tachycardia present.      Pulses: Normal pulses.      Heart sounds: Normal heart sounds.   Pulmonary:      Effort: Pulmonary effort is normal. No respiratory distress.      Breath sounds: No stridor. Wheezing and rhonchi present. No rales.   Chest:      Chest wall: No tenderness.   Musculoskeletal:         General: Normal range of motion.      Cervical back: Normal range of motion and neck supple.   Lymphadenopathy:      Cervical: No cervical adenopathy.   Skin:     General: Skin is warm and dry.      Capillary Refill: Capillary refill takes less than 2 seconds.   Neurological:      General: No focal deficit present.      Mental Status: She is alert and oriented to person, place, and time.   Psychiatric:         Mood and Affect: Mood normal.         Behavior: Behavior normal.                   "

## 2025-02-12 ENCOUNTER — APPOINTMENT (INPATIENT)
Dept: RADIOLOGY | Facility: HOSPITAL | Age: 75
DRG: 871 | End: 2025-02-12
Payer: MEDICARE

## 2025-02-12 ENCOUNTER — APPOINTMENT (INPATIENT)
Dept: NON INVASIVE DIAGNOSTICS | Facility: HOSPITAL | Age: 75
DRG: 871 | End: 2025-02-12
Payer: MEDICARE

## 2025-02-12 PROBLEM — J81.0 FLASH PULMONARY EDEMA (HCC): Status: ACTIVE | Noted: 2025-02-12

## 2025-02-12 LAB
2HR DELTA HS TROPONIN: 528 NG/L
ALBUMIN SERPL BCG-MCNC: 3.1 G/DL (ref 3.5–5)
ALP SERPL-CCNC: 86 U/L (ref 34–104)
ALT SERPL W P-5'-P-CCNC: 25 U/L (ref 7–52)
ANION GAP SERPL CALCULATED.3IONS-SCNC: 7 MMOL/L (ref 4–13)
ANION GAP SERPL CALCULATED.3IONS-SCNC: 7 MMOL/L (ref 4–13)
ANISOCYTOSIS BLD QL SMEAR: PRESENT
AST SERPL W P-5'-P-CCNC: 44 U/L (ref 13–39)
ATRIAL RATE: 109 BPM
ATRIAL RATE: 124 BPM
ATRIAL RATE: 126 BPM
BACTERIA UR QL AUTO: ABNORMAL /HPF
BASE EX.OXY STD BLDV CALC-SCNC: 30.7 % (ref 60–80)
BASE EXCESS BLDV CALC-SCNC: -4.1 MMOL/L
BASOPHILS # BLD AUTO: 0.06 THOUSANDS/ΜL (ref 0–0.1)
BASOPHILS NFR BLD AUTO: 0 % (ref 0–1)
BILIRUB SERPL-MCNC: 0.31 MG/DL (ref 0.2–1)
BILIRUB UR QL STRIP: NEGATIVE
BNP SERPL-MCNC: 421 PG/ML (ref 0–100)
BUN SERPL-MCNC: 18 MG/DL (ref 5–25)
BUN SERPL-MCNC: 18 MG/DL (ref 5–25)
CA-I BLD-SCNC: 1.14 MMOL/L (ref 1.12–1.32)
CALCIUM ALBUM COR SERPL-MCNC: 9.3 MG/DL (ref 8.3–10.1)
CALCIUM SERPL-MCNC: 8.6 MG/DL (ref 8.4–10.2)
CALCIUM SERPL-MCNC: 8.6 MG/DL (ref 8.4–10.2)
CARDIAC TROPONIN I PNL SERPL HS: 451 NG/L (ref ?–50)
CARDIAC TROPONIN I PNL SERPL HS: 979 NG/L (ref ?–50)
CHLORIDE SERPL-SCNC: 106 MMOL/L (ref 96–108)
CHLORIDE SERPL-SCNC: 110 MMOL/L (ref 96–108)
CLARITY UR: CLEAR
CO2 SERPL-SCNC: 26 MMOL/L (ref 21–32)
CO2 SERPL-SCNC: 27 MMOL/L (ref 21–32)
COLOR UR: YELLOW
CREAT SERPL-MCNC: 0.78 MG/DL (ref 0.6–1.3)
CREAT SERPL-MCNC: 0.8 MG/DL (ref 0.6–1.3)
EOSINOPHIL # BLD AUTO: 0 THOUSAND/ΜL (ref 0–0.61)
EOSINOPHIL NFR BLD AUTO: 0 % (ref 0–6)
ERYTHROCYTE [DISTWIDTH] IN BLOOD BY AUTOMATED COUNT: 16 % (ref 11.6–15.1)
ERYTHROCYTE [DISTWIDTH] IN BLOOD BY AUTOMATED COUNT: 16.4 % (ref 11.6–15.1)
GFR SERPL CREATININE-BSD FRML MDRD: 72 ML/MIN/1.73SQ M
GFR SERPL CREATININE-BSD FRML MDRD: 75 ML/MIN/1.73SQ M
GLUCOSE SERPL-MCNC: 128 MG/DL (ref 65–140)
GLUCOSE SERPL-MCNC: 198 MG/DL (ref 65–140)
GLUCOSE SERPL-MCNC: 203 MG/DL (ref 65–140)
GLUCOSE UR STRIP-MCNC: NEGATIVE MG/DL
HCO3 BLDV-SCNC: 23.2 MMOL/L (ref 24–30)
HCT VFR BLD AUTO: 32.9 % (ref 34.8–46.1)
HCT VFR BLD AUTO: 35.3 % (ref 34.8–46.1)
HGB BLD-MCNC: 10.8 G/DL (ref 11.5–15.4)
HGB BLD-MCNC: 11.6 G/DL (ref 11.5–15.4)
HGB UR QL STRIP.AUTO: ABNORMAL
IMM GRANULOCYTES # BLD AUTO: 0.38 THOUSAND/UL (ref 0–0.2)
IMM GRANULOCYTES NFR BLD AUTO: 1 % (ref 0–2)
IPAP: 14
KETONES UR STRIP-MCNC: NEGATIVE MG/DL
L PNEUMO1 AG UR QL IA.RAPID: NEGATIVE
LACTATE SERPL-SCNC: 1.2 MMOL/L (ref 0.5–2)
LACTATE SERPL-SCNC: 2.2 MMOL/L (ref 0.5–2)
LEUKOCYTE ESTERASE UR QL STRIP: NEGATIVE
LG PLATELETS BLD QL SMEAR: PRESENT
LYMPHOCYTES # BLD AUTO: 0.35 THOUSANDS/ΜL (ref 0.6–4.47)
LYMPHOCYTES NFR BLD AUTO: 1 % (ref 14–44)
MAGNESIUM SERPL-MCNC: 2 MG/DL (ref 1.9–2.7)
MCH RBC QN AUTO: 29.9 PG (ref 26.8–34.3)
MCH RBC QN AUTO: 30.2 PG (ref 26.8–34.3)
MCHC RBC AUTO-ENTMCNC: 32.8 G/DL (ref 31.4–37.4)
MCHC RBC AUTO-ENTMCNC: 32.9 G/DL (ref 31.4–37.4)
MCV RBC AUTO: 91 FL (ref 82–98)
MCV RBC AUTO: 92 FL (ref 82–98)
MONOCYTES # BLD AUTO: 1.36 THOUSAND/ΜL (ref 0.17–1.22)
MONOCYTES NFR BLD AUTO: 5 % (ref 4–12)
NEUTROPHILS # BLD AUTO: 27.01 THOUSANDS/ΜL (ref 1.85–7.62)
NEUTS SEG NFR BLD AUTO: 93 % (ref 43–75)
NITRITE UR QL STRIP: NEGATIVE
NON VENT- BIPAP: ABNORMAL
NON-SQ EPI CELLS URNS QL MICRO: ABNORMAL /HPF
NRBC BLD AUTO-RTO: 0 /100 WBCS
O2 CT BLDV-SCNC: 4.9 ML/DL
OTHER STN SPEC: ABNORMAL
P AXIS: 56 DEGREES
P AXIS: 71 DEGREES
P AXIS: 77 DEGREES
PCO2 BLDV: 52.8 MM HG (ref 42–50)
PEEP MAX SETTING VENT: 6 CM[H2O]
PH BLDV: 7.26 [PH] (ref 7.3–7.4)
PH UR STRIP.AUTO: 6.5 [PH]
PHOSPHATE SERPL-MCNC: 4.6 MG/DL (ref 2.3–4.1)
PLATELET # BLD AUTO: 320 THOUSANDS/UL (ref 149–390)
PLATELET # BLD AUTO: 340 THOUSANDS/UL (ref 149–390)
PLATELET BLD QL SMEAR: ADEQUATE
PMV BLD AUTO: 9.3 FL (ref 8.9–12.7)
PMV BLD AUTO: 9.7 FL (ref 8.9–12.7)
PO2 BLDV: 21.6 MM HG (ref 35–45)
POTASSIUM SERPL-SCNC: 3.7 MMOL/L (ref 3.5–5.3)
POTASSIUM SERPL-SCNC: 4.3 MMOL/L (ref 3.5–5.3)
PR INTERVAL: 160 MS
PR INTERVAL: 164 MS
PR INTERVAL: 168 MS
PROCALCITONIN SERPL-MCNC: 0.52 NG/ML
PROT SERPL-MCNC: 6.5 G/DL (ref 6.4–8.4)
PROT UR STRIP-MCNC: ABNORMAL MG/DL
QRS AXIS: -22 DEGREES
QRS AXIS: -27 DEGREES
QRS AXIS: -44 DEGREES
QRSD INTERVAL: 106 MS
QRSD INTERVAL: 96 MS
QRSD INTERVAL: 96 MS
QT INTERVAL: 306 MS
QT INTERVAL: 348 MS
QT INTERVAL: 350 MS
QTC INTERVAL: 439 MS
QTC INTERVAL: 468 MS
QTC INTERVAL: 506 MS
RBC # BLD AUTO: 3.58 MILLION/UL (ref 3.81–5.12)
RBC # BLD AUTO: 3.88 MILLION/UL (ref 3.81–5.12)
RBC #/AREA URNS AUTO: ABNORMAL /HPF
RBC MORPH BLD: PRESENT
S PNEUM AG UR QL: NEGATIVE
SODIUM SERPL-SCNC: 140 MMOL/L (ref 135–147)
SODIUM SERPL-SCNC: 143 MMOL/L (ref 135–147)
SP GR UR STRIP.AUTO: 1.01 (ref 1–1.03)
T WAVE AXIS: 71 DEGREES
T WAVE AXIS: 72 DEGREES
T WAVE AXIS: 83 DEGREES
UROBILINOGEN UR QL STRIP.AUTO: 0.2 E.U./DL
VENT BIPAP FIO2: 50 %
VENTRICULAR RATE: 109 BPM
VENTRICULAR RATE: 124 BPM
VENTRICULAR RATE: 126 BPM
WBC # BLD AUTO: 21.25 THOUSAND/UL (ref 4.31–10.16)
WBC # BLD AUTO: 29.16 THOUSAND/UL (ref 4.31–10.16)
WBC #/AREA URNS AUTO: ABNORMAL /HPF

## 2025-02-12 PROCEDURE — 80053 COMPREHEN METABOLIC PANEL: CPT | Performed by: PHYSICIAN ASSISTANT

## 2025-02-12 PROCEDURE — 83605 ASSAY OF LACTIC ACID: CPT | Performed by: PHYSICIAN ASSISTANT

## 2025-02-12 PROCEDURE — 84100 ASSAY OF PHOSPHORUS: CPT | Performed by: PHYSICIAN ASSISTANT

## 2025-02-12 PROCEDURE — 82330 ASSAY OF CALCIUM: CPT | Performed by: PHYSICIAN ASSISTANT

## 2025-02-12 PROCEDURE — 83880 ASSAY OF NATRIURETIC PEPTIDE: CPT | Performed by: PHYSICIAN ASSISTANT

## 2025-02-12 PROCEDURE — 94760 N-INVAS EAR/PLS OXIMETRY 1: CPT

## 2025-02-12 PROCEDURE — 82948 REAGENT STRIP/BLOOD GLUCOSE: CPT

## 2025-02-12 PROCEDURE — 93005 ELECTROCARDIOGRAM TRACING: CPT

## 2025-02-12 PROCEDURE — NC001 PR NO CHARGE: Performed by: STUDENT IN AN ORGANIZED HEALTH CARE EDUCATION/TRAINING PROGRAM

## 2025-02-12 PROCEDURE — 80048 BASIC METABOLIC PNL TOTAL CA: CPT | Performed by: FAMILY MEDICINE

## 2025-02-12 PROCEDURE — 71045 X-RAY EXAM CHEST 1 VIEW: CPT

## 2025-02-12 PROCEDURE — 94002 VENT MGMT INPAT INIT DAY: CPT

## 2025-02-12 PROCEDURE — 81001 URINALYSIS AUTO W/SCOPE: CPT | Performed by: EMERGENCY MEDICINE

## 2025-02-12 PROCEDURE — 84145 PROCALCITONIN (PCT): CPT | Performed by: FAMILY MEDICINE

## 2025-02-12 PROCEDURE — 99233 SBSQ HOSP IP/OBS HIGH 50: CPT | Performed by: STUDENT IN AN ORGANIZED HEALTH CARE EDUCATION/TRAINING PROGRAM

## 2025-02-12 PROCEDURE — 83735 ASSAY OF MAGNESIUM: CPT | Performed by: PHYSICIAN ASSISTANT

## 2025-02-12 PROCEDURE — 87449 NOS EACH ORGANISM AG IA: CPT | Performed by: FAMILY MEDICINE

## 2025-02-12 PROCEDURE — 94640 AIRWAY INHALATION TREATMENT: CPT

## 2025-02-12 PROCEDURE — 93010 ELECTROCARDIOGRAM REPORT: CPT | Performed by: INTERNAL MEDICINE

## 2025-02-12 PROCEDURE — 99232 SBSQ HOSP IP/OBS MODERATE 35: CPT | Performed by: FAMILY MEDICINE

## 2025-02-12 PROCEDURE — 82805 BLOOD GASES W/O2 SATURATION: CPT | Performed by: PHYSICIAN ASSISTANT

## 2025-02-12 PROCEDURE — 85027 COMPLETE CBC AUTOMATED: CPT | Performed by: FAMILY MEDICINE

## 2025-02-12 PROCEDURE — 87081 CULTURE SCREEN ONLY: CPT | Performed by: PHYSICIAN ASSISTANT

## 2025-02-12 PROCEDURE — 85025 COMPLETE CBC W/AUTO DIFF WBC: CPT | Performed by: PHYSICIAN ASSISTANT

## 2025-02-12 PROCEDURE — 84484 ASSAY OF TROPONIN QUANT: CPT | Performed by: PHYSICIAN ASSISTANT

## 2025-02-12 PROCEDURE — 94003 VENT MGMT INPAT SUBQ DAY: CPT

## 2025-02-12 RX ORDER — METOPROLOL TARTRATE 1 MG/ML
2.5 INJECTION, SOLUTION INTRAVENOUS ONCE
Status: COMPLETED | OUTPATIENT
Start: 2025-02-12 | End: 2025-02-12

## 2025-02-12 RX ORDER — DEXMEDETOMIDINE HYDROCHLORIDE 4 UG/ML
.1-.7 INJECTION, SOLUTION INTRAVENOUS
Status: DISCONTINUED | OUTPATIENT
Start: 2025-02-12 | End: 2025-02-14

## 2025-02-12 RX ORDER — FUROSEMIDE 10 MG/ML
40 INJECTION INTRAMUSCULAR; INTRAVENOUS ONCE
Status: DISCONTINUED | OUTPATIENT
Start: 2025-02-12 | End: 2025-02-13

## 2025-02-12 RX ORDER — METOPROLOL TARTRATE 1 MG/ML
5 INJECTION, SOLUTION INTRAVENOUS ONCE
Status: COMPLETED | OUTPATIENT
Start: 2025-02-12 | End: 2025-02-12

## 2025-02-12 RX ORDER — FUROSEMIDE 10 MG/ML
INJECTION INTRAMUSCULAR; INTRAVENOUS CODE/TRAUMA/SEDATION MEDICATION
Status: COMPLETED | OUTPATIENT
Start: 2025-02-12 | End: 2025-02-12

## 2025-02-12 RX ADMIN — METOROPROLOL TARTRATE 5 MG: 5 INJECTION, SOLUTION INTRAVENOUS at 18:28

## 2025-02-12 RX ADMIN — BUDESONIDE INHALATION 0.5 MG: 0.5 SUSPENSION RESPIRATORY (INHALATION) at 07:36

## 2025-02-12 RX ADMIN — METOROPROLOL TARTRATE 2.5 MG: 5 INJECTION, SOLUTION INTRAVENOUS at 15:10

## 2025-02-12 RX ADMIN — GUAIFENESIN 600 MG: 600 TABLET ORAL at 08:13

## 2025-02-12 RX ADMIN — DIVALPROEX SODIUM 250 MG: 125 CAPSULE, COATED PELLETS ORAL at 17:53

## 2025-02-12 RX ADMIN — FUROSEMIDE 40 MG: 10 INJECTION, SOLUTION INTRAMUSCULAR; INTRAVENOUS at 18:37

## 2025-02-12 RX ADMIN — LEVALBUTEROL HYDROCHLORIDE 1.25 MG: 1.25 SOLUTION RESPIRATORY (INHALATION) at 13:25

## 2025-02-12 RX ADMIN — SODIUM CHLORIDE, SODIUM GLUCONATE, SODIUM ACETATE, POTASSIUM CHLORIDE, MAGNESIUM CHLORIDE, SODIUM PHOSPHATE, DIBASIC, AND POTASSIUM PHOSPHATE 75 ML/HR: .53; .5; .37; .037; .03; .012; .00082 INJECTION, SOLUTION INTRAVENOUS at 01:15

## 2025-02-12 RX ADMIN — ACETAMINOPHEN 325MG 650 MG: 325 TABLET ORAL at 13:48

## 2025-02-12 RX ADMIN — DIVALPROEX SODIUM 250 MG: 125 CAPSULE, COATED PELLETS ORAL at 08:13

## 2025-02-12 RX ADMIN — METHYLPREDNISOLONE SODIUM SUCCINATE 40 MG: 40 INJECTION, POWDER, FOR SOLUTION INTRAMUSCULAR; INTRAVENOUS at 22:32

## 2025-02-12 RX ADMIN — METHYLPREDNISOLONE SODIUM SUCCINATE 40 MG: 40 INJECTION, POWDER, FOR SOLUTION INTRAMUSCULAR; INTRAVENOUS at 13:50

## 2025-02-12 RX ADMIN — CEFTRIAXONE 1000 MG: 1 INJECTION, SOLUTION INTRAVENOUS at 18:01

## 2025-02-12 RX ADMIN — DULOXETINE HYDROCHLORIDE 30 MG: 30 CAPSULE, DELAYED RELEASE ORAL at 08:13

## 2025-02-12 RX ADMIN — OLANZAPINE 7.5 MG: 2.5 TABLET, FILM COATED ORAL at 22:32

## 2025-02-12 RX ADMIN — SODIUM CHLORIDE, SODIUM GLUCONATE, SODIUM ACETATE, POTASSIUM CHLORIDE, MAGNESIUM CHLORIDE, SODIUM PHOSPHATE, DIBASIC, AND POTASSIUM PHOSPHATE 75 ML/HR: .53; .5; .37; .037; .03; .012; .00082 INJECTION, SOLUTION INTRAVENOUS at 13:49

## 2025-02-12 RX ADMIN — LEVALBUTEROL HYDROCHLORIDE 1.25 MG: 1.25 SOLUTION RESPIRATORY (INHALATION) at 07:36

## 2025-02-12 RX ADMIN — BUDESONIDE INHALATION 0.5 MG: 0.5 SUSPENSION RESPIRATORY (INHALATION) at 20:16

## 2025-02-12 RX ADMIN — ENOXAPARIN SODIUM 40 MG: 40 INJECTION SUBCUTANEOUS at 08:12

## 2025-02-12 RX ADMIN — GUAIFENESIN 600 MG: 600 TABLET ORAL at 17:53

## 2025-02-12 RX ADMIN — IPRATROPIUM BROMIDE 0.5 MG: 0.5 SOLUTION RESPIRATORY (INHALATION) at 20:16

## 2025-02-12 RX ADMIN — METHYLPREDNISOLONE SODIUM SUCCINATE 40 MG: 40 INJECTION, POWDER, FOR SOLUTION INTRAMUSCULAR; INTRAVENOUS at 05:53

## 2025-02-12 RX ADMIN — LEVALBUTEROL HYDROCHLORIDE 1.25 MG: 1.25 SOLUTION RESPIRATORY (INHALATION) at 20:16

## 2025-02-12 NOTE — ED PROVIDER NOTES
Time reflects when diagnosis was documented in both MDM as applicable and the Disposition within this note       Time User Action Codes Description Comment    2/11/2025  9:11 PM Zenaida Ortiz Add [J18.9] Pneumonia     2/11/2025  9:11 PM Zenaida Ortiz Add [R09.02] Hypoxia     2/11/2025  9:11 PM ThorZenaida landis Add [A41.9] Sepsis (HCC)           ED Disposition       ED Disposition   Admit    Condition   Stable    Date/Time   Tue Feb 11, 2025  9:11 PM    Comment   Case was discussed with Dr Sousa and the patient's admission status was agreed to be Admission Status: inpatient status to the service of Dr. Sousa   .               Assessment & Plan       Medical Decision Making  Differential diagnosis includes but not limited to: Asthma exacerbation, COPD exacerbation, CHF, pneumonia, bronchitis, viral syndrome    Amount and/or Complexity of Data Reviewed  Labs: ordered.  Radiology: ordered.    Risk  Prescription drug management.  Decision regarding hospitalization.        ED Course as of 02/11/25 2112 Tue Feb 11, 2025 2109 Case was discussed with the hospitalist who accepts the patient for admission         Medications   DULoxetine (CYMBALTA) delayed release capsule 30 mg (has no administration in time range)   sodium chloride 0.9 % bolus 1,000 mL (1,000 mL Intravenous New Bag 2/11/25 2040)     Followed by   sodium chloride 0.9 % bolus 1,000 mL (1,000 mL Intravenous New Bag 2/11/25 2048)   methylPREDNISolone sodium succinate (Solu-MEDROL) injection 125 mg (125 mg Intravenous Given 2/11/25 1827)   albuterol inhalation solution 10 mg (10 mg Nebulization Given 2/11/25 1857)   ipratropium (ATROVENT) 0.02 % inhalation solution 1 mg (1 mg Nebulization Given 2/11/25 1857)   sodium chloride 0.9 % inhalation solution 9 mL (9 mL Nebulization Given 2/11/25 1857)   cefTRIAXone (ROCEPHIN) IVPB (premix in dextrose) 2,000 mg 50 mL (0 mg Intravenous Stopped 2/11/25 1958)   sodium chloride 0.9 % bolus 1,000 mL (0 mL Intravenous  Stopped 2/11/25 2027)   divalproex sodium (DEPAKOTE SPRINKLE) capsule 250 mg (250 mg Oral Given 2/11/25 1935)   OLANZapine (ZyPREXA) tablet 7.5 mg (7.5 mg Oral Given 2/11/25 1932)   iohexol (OMNIPAQUE) 350 MG/ML injection (MULTI-DOSE) 85 mL (85 mL Intravenous Given 2/11/25 2004)       ED Risk Strat Scores                          SBIRT 20yo+      Flowsheet Row Most Recent Value   Initial Alcohol Screen: US AUDIT-C     1. How often do you have a drink containing alcohol? 0 Filed at: 02/11/2025 1801   2. How many drinks containing alcohol do you have on a typical day you are drinking?  0 Filed at: 02/11/2025 1801   3a. Male UNDER 65: How often do you have five or more drinks on one occasion? 0 Filed at: 02/11/2025 1801   3b. FEMALE Any Age, or MALE 65+: How often do you have 4 or more drinks on one occassion? 0 Filed at: 02/11/2025 1801   Audit-C Score 0 Filed at: 02/11/2025 1801   SARAH: How many times in the past year have you...    Used an illegal drug or used a prescription medication for non-medical reasons? Never Filed at: 02/11/2025 1801                            History of Present Illness       Chief Complaint   Patient presents with    Shortness of Breath    Cough     Cough for the past week, seen at [facility], pt advised to come to ED. Pt has audible wheeze and 86% on room air       Past Medical History:   Diagnosis Date    Depression     Esophageal stricture     History of gastroesophageal reflux (GERD)     Iron deficiency anemia     Meniere disease       Past Surgical History:   Procedure Laterality Date    COLONOSCOPY  2012    FL LUMBAR PUNCTURE DIAGNOSTIC  12/19/2024    GALLBLADDER SURGERY      HYSTERECTOMY      OTHER SURGICAL HISTORY      Endoscopy of stricture 2010, 2012, 5/2014 - Esophageal ulcer; 7/2014 - Esophageal ulcer; 10/2015 (Dr. Jones) - Esophageal stricture; 10/2016; 11/2017 - Stricture      Family History   Problem Relation Age of Onset    Stroke Mother     Hypertension Mother     Emphysema Father        Social History     Tobacco Use    Smoking status: Some Days     Types: Cigarettes    Smokeless tobacco: Never   Vaping Use    Vaping status: Never Used   Substance Use Topics    Alcohol use: Not Currently     Comment: Denies alcohol use - As per [facility]     Drug use: Not Currently      E-Cigarette/Vaping    E-Cigarette Use Never User       E-Cigarette/Vaping Substances      I have reviewed and agree with the history as documented.     This is a 74-year-old female presenting to the ED for evaluation of wheezing and shortness of breath that has been ongoing for several days.  Patient lives at home and is accompanied to the hospital with her daughter who states that the breathing became more significant today and when patient went to urgent care her oxygen on room air was 86% and she had to be placed on supplemental oxygen.  She denies any fever chills recent travel or sick contacts.        Review of Systems   Constitutional:  Positive for fatigue. Negative for chills and fever.   HENT:  Negative for ear pain and sore throat.    Eyes:  Negative for pain and visual disturbance.   Respiratory:  Positive for shortness of breath. Negative for cough.    Cardiovascular:  Negative for chest pain and palpitations.   Gastrointestinal:  Negative for abdominal pain and vomiting.   Genitourinary:  Negative for dysuria and hematuria.   Musculoskeletal:  Negative for arthralgias and back pain.   Skin:  Negative for color change and rash.   Neurological:  Positive for weakness. Negative for seizures and syncope.   All other systems reviewed and are negative.          Objective       ED Triage Vitals [02/11/25 1758]   Temperature Pulse Blood Pressure Respirations SpO2 Patient Position - Orthostatic VS   (!) 96.8 °F (36 °C) (!) 116 118/59 20 (!) 86 % Lying      Temp Source Heart Rate Source BP Location FiO2 (%) Pain Score    Temporal Monitor Right arm -- No Pain      Vitals      Date and Time Temp Pulse SpO2 Resp BP Pain Score  FACES Pain Rating User   02/11/25 1830 -- 110 94 % 20 110/52 -- -- AS   02/11/25 1815 98.4 °F (36.9 °C) 111 94 % 20 123/58 -- -- AS   02/11/25 1808 -- 111 94 % -- 95/51 -- -- AS   02/11/25 1800 -- 115 91 % 20 95/51 -- -- AS   02/11/25 1758 96.8 °F (36 °C) 116 86 % 20 118/59 No Pain -- MY            Physical Exam  Vitals and nursing note reviewed.   Constitutional:       General: She is in acute distress.      Appearance: She is well-developed. She is ill-appearing. She is not diaphoretic.   HENT:      Head: Normocephalic and atraumatic.      Mouth/Throat:      Mouth: Mucous membranes are moist.   Eyes:      Extraocular Movements: Extraocular movements intact.      Conjunctiva/sclera: Conjunctivae normal.   Neck:      Thyroid: No thyromegaly.      Vascular: No hepatojugular reflux or JVD.   Cardiovascular:      Rate and Rhythm: Regular rhythm. Tachycardia present.      Heart sounds: No murmur heard.  Pulmonary:      Effort: Tachypnea and accessory muscle usage present. No respiratory distress.      Breath sounds: Examination of the right-upper field reveals wheezing. Examination of the left-upper field reveals wheezing. Examination of the right-middle field reveals wheezing. Examination of the left-middle field reveals wheezing. Wheezing present. No rhonchi or rales.   Chest:      Chest wall: No mass, deformity, tenderness, crepitus or edema. There is no dullness to percussion.   Abdominal:      General: Bowel sounds are normal.      Palpations: Abdomen is soft.      Tenderness: There is no abdominal tenderness.   Musculoskeletal:         General: No swelling. Normal range of motion.      Cervical back: Neck supple.      Right lower leg: No tenderness. No edema.      Left lower leg: No tenderness. No edema.   Lymphadenopathy:      Cervical: No cervical adenopathy.   Skin:     General: Skin is warm and dry.      Capillary Refill: Capillary refill takes less than 2 seconds.      Coloration: Skin is not cyanotic or  pale.      Findings: No ecchymosis, erythema or rash.      Nails: There is no clubbing.   Neurological:      General: No focal deficit present.      Mental Status: She is alert and oriented to person, place, and time.   Psychiatric:         Mood and Affect: Mood normal.         Results Reviewed       Procedure Component Value Units Date/Time    Lactic acid, plasma (w/reflex if result > 2.0) [[id number]] Collected: 02/11/25 2045    Lab Status: In process Specimen: Blood from Arm, Right Updated: 02/11/25 2107    UA w Reflex to Microscopic w Reflex to Culture [[id number]]     Lab Status: No result Specimen: Urine     Procalcitonin [[id number]]  (Abnormal) Collected: 02/11/25 1823    Lab Status: Final result Specimen: Blood from Arm, Right Updated: 02/11/25 1909     Procalcitonin 1.01 ng/ml     Basic metabolic panel [[id number]] Collected: 02/11/25 1823    Lab Status: Final result Specimen: Blood from Arm, Right Updated: 02/11/25 1901     Sodium 137 mmol/L      Potassium 3.5 mmol/L      Chloride 103 mmol/L      CO2 25 mmol/L      ANION GAP 9 mmol/L      BUN 19 mg/dL      Creatinine 1.09 mg/dL      Glucose 127 mg/dL      Calcium 8.7 mg/dL      eGFR 50 ml/min/1.73sq m     Narrative:      National Kidney Disease Foundation guidelines for Chronic Kidney Disease (CKD):     Stage 1 with normal or high GFR (GFR > 90 mL/min/1.73 square meters)    Stage 2 Mild CKD (GFR = 60-89 mL/min/1.73 square meters)    Stage 3A Moderate CKD (GFR = 45-59 mL/min/1.73 square meters)    Stage 3B Moderate CKD (GFR = 30-44 mL/min/1.73 square meters)    Stage 4 Severe CKD (GFR = 15-29 mL/min/1.73 square meters)    Stage 5 End Stage CKD (GFR <15 mL/min/1.73 square meters)  Note: GFR calculation is accurate only with a steady state creatinine    FLU/COVID Rapid Antigen (30 min. TAT) - Preferred screening test in ED [[id number]]  (Normal) Collected: 02/11/25 1834    Lab Status: Final result Specimen: Nares from Nose Updated: 02/11/25 1856     SARS COV  Rapid Antigen Negative     Influenza A Rapid Antigen Negative     Influenza B Rapid Antigen Negative    Narrative:      This test has been performed using the [email or web address] Chiara 2 FLU+SARS Antigen test under the Emergency Use Authorization (EUA). This test has been validated by the  and verified by the performing laboratory. The Chiara uses lateral flow immunofluorescent sandwich assay to detect SARS-COV, Influenza A and Influenza B Antigen.     The [employer] Chiara 2 SARS Antigen test does not differentiate between SARS-CoV and SARS-CoV-2.     Negative results are presumptive and may be confirmed with a molecular assay, if necessary, for patient management. Negative results do not rule out SARS-CoV-2 or influenza infection and should not be used as the sole basis for treatment or patient management decisions. A negative test result may occur if the level of antigen in a sample is below the limit of detection of this test.     Positive results are indicative of the presence of viral antigens, but do not rule out bacterial infection or co-infection with other viruses.     All test results should be used as an adjunct to clinical observations and other information available to the provider.    FOR PEDIATRIC PATIENTS - copy/paste COVID Guidelines URL to browser: [email or web address]    CBC and differential [[id number]]  (Abnormal) Collected: 02/11/25 1823    Lab Status: Final result Specimen: Blood from Arm, Right Updated: 02/11/25 1845     WBC 17.09 Thousand/uL      RBC 3.99 Million/uL      Hemoglobin 11.9 g/dL      Hematocrit 35.7 %      MCV 90 fL      MCH 29.8 pg      MCHC 33.3 g/dL      RDW 15.5 %      MPV 9.9 fL      Platelets 330 Thousands/uL      nRBC 0 /100 WBCs      Segmented % 87 %      Immature Grans % 1 %      Lymphocytes % 4 %      Monocytes % 8 %      Eosinophils Relative 0 %      Basophils Relative 0 %      Absolute Neutrophils 14.86 Thousands/µL       Absolute Immature Grans 0.12 Thousand/uL      Absolute Lymphocytes 0.59 Thousands/µL      Absolute Monocytes 1.43 Thousand/µL      Eosinophils Absolute 0.03 Thousand/µL      Basophils Absolute 0.06 Thousands/µL     Blood culture #1 [[id number]] Collected: 02/11/25 1834    Lab Status: In process Specimen: Blood from Arm, Left Updated: 02/11/25 1837    Blood culture #2 [[id number]] Collected: 02/11/25 1823    Lab Status: In process Specimen: Blood from Arm, Right Updated: 02/11/25 1837            CTA chest pe study   Final Interpretation by Phylicia Mckenna MD (02/11 2103)      Negative for acute pulmonary thromboembolism.      Multifocal pneumonia with dense consolidation in both lower lobes.      Small left and trace right pleural effusions.      The study was marked in EPIC for immediate notification.                  Workstation performed: [id number]         XR chest 1 view portable    (Results Pending)       ECG 12 Lead Documentation Only    Date/Time: 2/11/2025 8:41 PM    Performed by: Zenaida Ortiz DO  Authorized by: Zenaida Ortiz DO    Indications / Diagnosis:  Sepsis  ECG reviewed by me, the ED Provider: yes    Patient location:  ED  Previous ECG:     Comparison to cardiac monitor: Yes    Interpretation:     Interpretation: abnormal    Rate:     ECG rate:  109    ECG rate assessment: tachycardic    Rhythm:     Rhythm: sinus tachycardia    Ectopy:     Ectopy: none    QRS:     QRS axis:  Normal    QRS intervals:  Normal  Conduction:     Conduction: normal    ST segments:     ST segments:  Normal  T waves:     T waves: normal        ED Medication and Procedure Management   Prior to Admission Medications   Prescriptions Last Dose Informant Patient Reported? Taking?   DULoxetine (CYMBALTA) 30 mg delayed release capsule   Yes No   Sig: Take 1 capsule by mouth in the morning   LORazepam (Ativan) 0.5 mg tablet   No No   Sig: Take 1 tablet (0.5 mg total) by mouth 3 (three) times a day as needed  for anxiety or sleep for up to 10 days   Patient not taking: Reported on 2/11/2025   OLANZapine (ZyPREXA) 7.5 mg tablet   Yes No   Sig: Take 7.5 mg by mouth   acetaminophen (TYLENOL) 325 mg tablet   Yes No   Sig: Take 650 mg by mouth every 6 (six) hours as needed for mild pain   Patient not taking: Reported on 2/11/2025   albuterol (ProAir HFA) 90 mcg/act inhaler   No No   Sig: Inhale 2 puffs every 6 (six) hours as needed for wheezing or shortness of breath   Patient not taking: Reported on 2/11/2025   amLODIPine (NORVASC) 5 mg tablet   No No   Sig: Take 1 tablet (5 mg total) by mouth daily for 20 days   Patient not taking: Reported on 2/11/2025   citalopram (CeleXA) 20 mg tablet   No No   Sig: Take 1 tablet daily   Patient not taking: Reported on 2/11/2025   divalproex sodium (DEPAKOTE SPRINKLE) 125 MG capsule   Yes No   Sig: Take 250 mg by mouth 2 (two) times a day   ferrous sulfate 325 (65 Fe) mg tablet   Yes No   Sig: Take 325 mg by mouth daily with breakfast   Patient not taking: Reported on 2/11/2025   guaiFENesin (MUCINEX PO)   Yes No   Sig: Take by mouth   Patient not taking: Reported on 2/11/2025   meclizine (ANTIVERT) 25 mg tablet   No No   Sig: Take 1 tablet (25 mg total) by mouth 3 (three) times a day as needed for dizziness   Patient not taking: Reported on 2/11/2025   mometasone (ELOCON) 0.1 % cream   No No   Sig: Apply topically daily for 5 days   Patient not taking: Reported on 2/11/2025   omeprazole (PriLOSEC) 20 mg delayed release capsule   No No   Sig: Take 1 capsule (20 mg total) by mouth daily   Patient not taking: Reported on 2/11/2025      Facility-Administered Medications: None     Patient's Medications   Discharge Prescriptions    No medications on file     No discharge procedures on file.  ED SEPSIS DOCUMENTATION   Time reflects when diagnosis was documented in both MDM as applicable and the Disposition within this note       Time User Action Codes Description Comment    2/11/2025  9:11 PM  Thorpe, Dahlia Add [J18.9] Pneumonia     2/11/2025  9:11 PM Zenaida Ortiz [R09.02] Hypoxia     2/11/2025  9:11 PM Zenaida Ortiz [A41.9] Sepsis (HCC)                  Zenaida Ortiz,   02/11/25 2112

## 2025-02-12 NOTE — ASSESSMENT & PLAN NOTE
No formal diagnosis of COPD, the patient smoked on and off for 30 years.  Daughter states she was a closet smoker.  IV Rocephin and Zithromax  Breathing treatments  IV Solu-Medrol 40 mg IV every 8 hours

## 2025-02-12 NOTE — PROGRESS NOTES
Progress Note - Hospitalist   Name: Bea Perez 74 y.o. female I MRN: [id number]  Unit/Bed#: -01 I Date of Admission: 2/11/2025   Date of Service: 2/12/2025 I Hospital Day: 1    Assessment & Plan  Multifocal pneumonia  CT chest showing multifocal pneumonia with dense consolidation in both lower lobes  We will do Rocephin and Zithromax  Urine strep and Legionella  Mucinex  Breathing treatments  Solu-Medrol 40 mg IV every 8 hours  Acute hypoxic respiratory failure (HCC)  Secondary to multifocal pneumonia, patient does not wear oxygen at home.  Titrate down as able  Sepsis with acute hypoxic respiratory failure (HCC)  With tachycardia, leukocytosis secondary to multifocal pneumonia.  Patient received IV antibiotics and IV fluids in the ER  Bronchitis  No formal diagnosis of COPD, the patient smoked on and off for 30 years.  Daughter states she was a closet smoker.  IV Rocephin and Zithromax  Breathing treatments  IV Solu-Medrol 40 mg IV every 8 hours  Current moderate episode of major depressive disorder without prior episode (HCC)  Continue with Depakote, Zyprexa and Cymbalta.    VTE Pharmacologic Prophylaxis: VTE Score: 5 Moderate Risk (Score 3-4) - Pharmacological DVT Prophylaxis Ordered: enoxaparin (Lovenox).    Mobility:   Basic Mobility Inpatient Raw Score: 20  JH-HLM Goal: 6: Walk 10 steps or more  JH-HLM Achieved: 6: Walk 10 steps or more  JH-HLM Goal achieved. Continue to encourage appropriate mobility.    Patient Centered Rounds: I performed bedside rounds with nursing staff today.   Discussions with Specialists or Other Care Team Provider: none    Education and Discussions with Family / Patient: will update    Current Length of Stay: 1 day(s)  Current Patient Status: Inpatient   Certification Statement: The patient will continue to require additional inpatient hospital stay due to multifocal pneumonia  Discharge Plan: Anticipate discharge in 24-48 hrs to home.    Code Status: Level 1 - Full  Code    Subjective   Patient feels quite ill with cough congestion and chest cold.  Denies any chest pain.  Currently requiring oxygen    Objective :  Temp:  [96.8 °F (36 °C)-99.3 °F (37.4 °C)] 97.3 °F (36.3 °C)  HR:  [] 89  BP: ()/(51-67) 129/63  Resp:  [18-24] 18  SpO2:  [83 %-96 %] 94 %  O2 Device: Nasal cannula  Nasal Cannula O2 Flow Rate (L/min):  [1 L/min-4 L/min] 1 L/min    Body mass index is 23.59 kg/m².     Input and Output Summary (last 24 hours):     Intake/Output Summary (Last 24 hours) at 2/12/2025 1107  Last data filed at 2/12/2025 0809  Gross per 24 hour   Intake 2932 ml   Output --   Net 2932 ml       Physical Exam  Vitals and nursing note reviewed.   Constitutional:       Appearance: Normal appearance.   HENT:      Head: Normocephalic and atraumatic.      Right Ear: External ear normal.      Left Ear: External ear normal.      Nose: Nose normal.      Mouth/Throat:      Pharynx: Oropharynx is clear.   Eyes:      Pupils: Pupils are equal, round, and reactive to light.   Cardiovascular:      Rate and Rhythm: Normal rate and regular rhythm.      Heart sounds: Normal heart sounds.   Pulmonary:      Effort: Pulmonary effort is normal.      Breath sounds: Rhonchi and rales present.   Abdominal:      General: Bowel sounds are normal.      Palpations: Abdomen is soft.      Tenderness: There is no abdominal tenderness.   Musculoskeletal:         General: Normal range of motion.      Cervical back: Normal range of motion and neck supple.   Skin:     General: Skin is warm and dry.      Capillary Refill: Capillary refill takes less than 2 seconds.   Neurological:      General: No focal deficit present.      Mental Status: She is alert and oriented to person, place, and time.   Psychiatric:         Mood and Affect: Mood normal.           Lines/Drains:              Lab Results: I have reviewed the following results:   Results from last 7 days   Lab Units 02/12/25  0551 02/11/25  1823   WBC Thousand/uL  21.25* 17.09*   HEMOGLOBIN g/dL 11.6 11.9   HEMATOCRIT % 35.3 35.7   PLATELETS Thousands/uL 340 330   SEGS PCT %  --  87*   LYMPHO PCT %  --  4*   MONO PCT %  --  8   EOS PCT %  --  0     Results from last 7 days   Lab Units 02/12/25  0551   SODIUM mmol/L 143   POTASSIUM mmol/L 3.7   CHLORIDE mmol/L 110*   CO2 mmol/L 26   BUN mg/dL 18   CREATININE mg/dL 0.78   ANION GAP mmol/L 7   CALCIUM mg/dL 8.6   GLUCOSE RANDOM mg/dL 128                 Results from last 7 days   Lab Units 02/12/25  0551 02/11/25  2045 02/11/25  1823   LACTIC ACID mmol/L  --  1.8  --    PROCALCITONIN ng/ml 0.52*  --  1.01*       Recent Cultures (last 7 days):   Results from last 7 days   Lab Units 02/12/25  0551 02/11/25  1834 02/11/25  1823   BLOOD CULTURE   --  Received in Microbiology Lab. Culture in Progress. Received in Microbiology Lab. Culture in Progress.   LEGIONELLA URINARY ANTIGEN  Negative  --   --        Imaging Results Review: I reviewed radiology reports from this admission including: CT chest.  Other Study Results Review: EKG was reviewed.     Last 24 Hours Medication List:     Current Facility-Administered Medications:     acetaminophen (TYLENOL) tablet 650 mg, Q6H PRN    azithromycin (ZITHROMAX) 500 mg in sodium chloride 0.9 % 250 mL IVPB, Q24H, Last Rate: 500 mg (02/11/25 2313)    budesonide (PULMICORT) inhalation solution 0.5 mg, Q12H    cefTRIAXone (ROCEPHIN) IVPB (premix in dextrose) 1,000 mg 50 mL, Q24H    divalproex sodium (DEPAKOTE SPRINKLE) capsule 250 mg, BID    DULoxetine (CYMBALTA) delayed release capsule 30 mg, Daily    enoxaparin (LOVENOX) subcutaneous injection 40 mg, Daily    guaiFENesin (MUCINEX) 12 hr tablet 600 mg, BID    influenza vaccine, high-dose (Fluzone High-Dose) IM injection 0.5 mL, Once    levalbuterol (XOPENEX) inhalation solution 1.25 mg, TID **AND** [DISCONTINUED] sodium chloride 0.9 % inhalation solution 3 mL, TID    methylPREDNISolone sodium succinate (Solu-MEDROL) injection 40 mg, Q8H BHARATH     multi-electrolyte (PLASMALYTE-A/ISOLYTE-S PH 7.4) IV solution, Continuous, Last Rate: 75 mL/hr (02/12/25 0115)    OLANZapine (ZyPREXA) tablet 7.5 mg, HS    Administrative Statements   Today, Patient Was Seen By: Amian Baires MD      **Please Note: This note may have been constructed using a voice recognition system.**

## 2025-02-12 NOTE — CASE MANAGEMENT
Case Management Assessment & Discharge Planning Note    Patient name Bea Perez  Location /-01 MRN [id number]  : 1950 Date 2025       Current Admission Date: 2025  Current Admission Diagnosis:Multifocal pneumonia   Patient Active Problem List    Diagnosis Date Noted Date Diagnosed    Multifocal pneumonia 2025     Acute hypoxic respiratory failure (HCC) 2025     Sepsis with acute hypoxic respiratory failure (HCC) 2025     Hypertension, essential 07/15/2024     Bronchitis 2022     Hypercholesterolemia 2020     Current moderate episode of major depressive disorder without prior episode (HCC) 2019     Gastro-esophageal reflux disease with esophagitis 2019     Iron deficiency anemia secondary to inadequate dietary iron intake 2019     Generalized anxiety disorder 2019     Seasonal allergic rhinitis due to pollen 2019     Pharyngoesophageal dysphagia 2019       LOS (days): 1  Geometric Mean LOS (GMLOS) (days): 4.9  Days to GMLOS:4.2     OBJECTIVE:    Risk of Unplanned Readmission Score: 18.17         Current admission status: Inpatient       Preferred Pharmacy:   [place] [address]  Phone: [phone] Fax: [phone]    [facility]/pharmacy #[facility] [place] [address]  Phone: [phone] Fax: [phone]    Primary Care Provider: Joyce Mcpherson MD    Primary Insurance: MEDICARE  Secondary Insurance: [facility] FOR [facility]    ASSESSMENT:  Active Health Care Proxies    There are no active Health Care Proxies on file.       Advance Directives  Does patient have a Health Care POA?: No  Was patient offered paperwork?: Yes (declined)  Does patient currently have a Health Care decision maker?: No  Does patient have Advance Directives?: No  Was patient offered paperwork?: Yes  (declined)  Primary Contact: Jean Carlos              Patient Information  Admitted from:: Home  Mental Status: Alert  During Assessment patient was accompanied by: Not accompanied during assessment  Assessment information provided by:: Patient  Primary Caregiver: Self  Support Systems: Spouse/significant other, Children, Family members  County [facility] Residence: [facility]  Home entry access options. Select all that apply.: Stairs  Number of steps to enter home.: 1  Type of Current Residence: [facility]  Living Arrangements: Lives w/ Spouse/significant other  Is patient a ?: No    Activities of Daily Living Prior to Admission  Functional Status: Independent  Completes ADLs independently?: Yes  Ambulates independently?: Yes  Does patient use assisted devices?: No  Does patient currently own DME?: Yes  What DME does the patient currently own?: Walker, Wheelchair, Straight Cane, Shower Chair  Does patient have a history of Outpatient Therapy (PT/OT)?: No  Does the patient have a history of Short-Term Rehab?: No  Does patient have a history of HHC?: No  Does patient currently have HHC?: No         Patient Information Continued  Income Source: SSI/SSD  Does patient have prescription coverage?: Yes  Does patient receive dialysis treatments?: No  Does patient have a history of substance abuse?: No  Does patient have a history of Mental Health Diagnosis?: Yes (generalized anxiety disorder)  Is patient receiving treatment for mental health?: Yes  Has patient received inpatient treatment related to mental health in the last 2 years?: No         Means of Transportation  [facility]          DISCHARGE DETAILS:    Discharge planning discussed with:: pt and pt daughter, who is at bedside  [place] of Choice: Yes     CM contacted family/caregiver?: Yes             Contacts  Patient Contacts: jean carlos and daughterpricilla  Relationship to Patient:: Family  Contact Method: Mail  Reason/Outcome:  Discharge Planning       Pt from home. Lives with .  Pt IPTA. PT drives.  Pt children are local and supportive, available for assistance at time of discharge.

## 2025-02-12 NOTE — ASSESSMENT & PLAN NOTE
With tachycardia, leukocytosis secondary to multifocal pneumonia.  Patient received IV antibiotics and IV fluids in the ER

## 2025-02-12 NOTE — PLAN OF CARE
Problem: Potential for Falls  Goal: Patient will remain free of falls  Description: INTERVENTIONS:  - Educate patient/family on patient safety including physical limitations  - Instruct patient to call for assistance with activity   - Consult OT/PT to assist with strengthening/mobility   - Keep Call bell within reach  - Keep bed low and locked with side rails adjusted as appropriate  - Keep care items and personal belongings within reach  - Initiate and maintain comfort rounds  - Make Fall Risk Sign visible to staff  - Offer Toileting every 2 Hours, in advance of need  - Initiate/Maintain bed alarm  - Obtain necessary fall risk management equipment  - Apply yellow socks and bracelet for high fall risk patients  - Consider moving patient to room near nurses station  Outcome: Progressing     Problem: PAIN - ADULT  Goal: Verbalizes/displays adequate comfort level or baseline comfort level  Description: Interventions:  - Encourage patient to monitor pain and request assistance  - Assess pain using appropriate pain scale  - Administer analgesics based on type and severity of pain and evaluate response  - Implement non-pharmacological measures as appropriate and evaluate response  - Consider cultural and social influences on pain and pain management  - Notify physician/advanced practitioner if interventions unsuccessful or patient reports new pain  Outcome: Progressing     Problem: INFECTION - ADULT  Goal: Absence or prevention of progression during hospitalization  Description: INTERVENTIONS:  - Assess and monitor for signs and symptoms of infection  - Monitor lab/diagnostic results  - Monitor all insertion sites, i.e. indwelling lines, tubes, and drains  - Monitor endotracheal if appropriate and nasal secretions for changes in amount and color  - [place] appropriate cooling/warming therapies per order  - Administer medications as ordered  - Instruct and encourage patient and family to use good hand hygiene  technique  - Identify and instruct in appropriate isolation precautions for identified infection/condition  Outcome: Progressing     Problem: SAFETY ADULT  Goal: Patient will remain free of falls  Description: INTERVENTIONS:  - Educate patient/family on patient safety including physical limitations  - Instruct patient to call for assistance with activity   - Consult OT/PT to assist with strengthening/mobility   - Keep Call bell within reach  - Keep bed low and locked with side rails adjusted as appropriate  - Keep care items and personal belongings within reach  - Initiate and maintain comfort rounds  - Make Fall Risk Sign visible to staff  - Offer Toileting every 2 Hours, in advance of need  - Initiate/Maintain bed alarm  - Obtain necessary fall risk management equipment: yellow socks  - Apply yellow socks and bracelet for high fall risk patients  - Consider moving patient to room near nurses station  Outcome: Progressing     Problem: DISCHARGE PLANNING  Goal: Discharge to home or other facility with appropriate resources  Description: INTERVENTIONS:  - Identify barriers to discharge w/patient and caregiver  - Arrange for needed discharge resources and transportation as appropriate  - Identify discharge learning needs (meds, wound care, etc.)  - Arrange for interpretive services to assist at discharge as needed  - Refer to Case Management Department for coordinating discharge planning if the patient needs post-hospital services based on physician/advanced practitioner order or complex needs related to functional status, cognitive ability, or social support system  Outcome: Progressing     Problem: Knowledge Deficit  Goal: Patient/family/caregiver demonstrates understanding of disease process, treatment plan, medications, and discharge instructions  Description: Complete learning assessment and assess knowledge base.  Interventions:  - Provide teaching at level of understanding  - Provide teaching via preferred  learning methods  Outcome: Progressing     Problem: RESPIRATORY - ADULT  Goal: Achieves optimal ventilation and oxygenation  Description: INTERVENTIONS:  - Assess for changes in respiratory status  - Assess for changes in mentation and behavior  - Position to facilitate oxygenation and minimize respiratory effort  - Oxygen administered by appropriate delivery if ordered  - Initiate smoking cessation education as indicated  - Encourage broncho-pulmonary hygiene including cough, deep breathe, Incentive Spirometry  - Assess the need for suctioning and aspirate as needed  - Assess and instruct to report SOB or any respiratory difficulty  - Respiratory Therapy support as indicated  Outcome: Progressing

## 2025-02-12 NOTE — H&P
H&P - Hospitalist   Name: Bea Perez 74 y.o. female I MRN: [id number]  Unit/Bed#: -01 I Date of Admission: 2/11/2025   Date of Service: 2/11/2025 I Hospital Day: 0     Assessment & Plan  Multifocal pneumonia  CT chest showing multifocal pneumonia with dense consolidation in both lower lobes  We will do Rocephin and Zithromax  Urine strep and Legionella  Mucinex  Breathing treatments  Solu-Medrol 40 mg IV every 8 hours  Acute hypoxic respiratory failure (HCC)  Secondary to multifocal pneumonia, patient does not wear oxygen at home.  Titrate down as able  Sepsis with acute hypoxic respiratory failure (HCC)  With tachycardia, leukocytosis secondary to multifocal pneumonia.  Patient received IV antibiotics and IV fluids in the ER  Bronchitis  No formal diagnosis of COPD, the patient smoked on and off for 30 years.  Daughter states she was a closet smoker.  IV Rocephin and Zithromax  Breathing treatments  IV Solu-Medrol 40 mg IV every 8 hours  Current moderate episode of major depressive disorder without prior episode (HCC)  Continue with Depakote, Zyprexa and Cymbalta.      Disposition  #1  IV Rocephin and Zithromax  #2  IV Solu-Medrol 40 mg IV every 8 hours  #3  Breathing treatments and Mucinex  #4  Titrate oxygen down if able  #5 Labs in the morning        VTE Pharmacologic Prophylaxis: VTE Score: 5 High Risk (Score >/= 5) - Pharmacological DVT Prophylaxis Ordered: enoxaparin (Lovenox). Sequential Compression Devices Ordered.  Code Status: Level 1 - Full Code   Discussion with family: Updated  (daughter) at bedside.    Anticipated Length of Stay: Patient will be admitted on an inpatient basis with an anticipated length of stay of greater than 2 midnights secondary to pneumonia, respiratory failure.    History of Present Illness   Chief Complaint: Shortness of breath and cough    Bea Perez is a 74 y.o. female with a PMH of depression, history of smoking on and off for 30 years in the past  who presents with shortness of breath and cough.  Her  is a sick at home but is doing much better.  Over the weekend she progressively got worse and cannot bring up the phlegm.  The patient was seen in urgent care and was noted to be hypoxic at 86% on room air and was sent to the ER.  Patient had diffuse wheezing and rhonchi and started on breathing treatments and IV antibiotic therapy.  Patient has no formal diagnosis COPD.  Patient does not wear oxygen at home.    Review of Systems   Constitutional:  Positive for fatigue.   HENT: Negative.     Respiratory:  Positive for cough and shortness of breath.    Cardiovascular: Negative.    Gastrointestinal: Negative.    Genitourinary: Negative.    Neurological:  Positive for weakness.       Historical Information   Past Medical History:   Diagnosis Date    Depression     Esophageal stricture     History of gastroesophageal reflux (GERD)     Iron deficiency anemia     Meniere disease      Past Surgical History:   Procedure Laterality Date    COLONOSCOPY  2012    FL LUMBAR PUNCTURE DIAGNOSTIC  12/19/2024    GALLBLADDER SURGERY      HYSTERECTOMY      OTHER SURGICAL HISTORY      Endoscopy of stricture 2010, 2012, 5/2014 - Esophageal ulcer; 7/2014 - Esophageal ulcer; 10/2015 (Dr. Jones) - Esophageal stricture; 10/2016; 11/2017 - Stricture     Social History     Tobacco Use    Smoking status: Some Days     Types: Cigarettes    Smokeless tobacco: Never   Vaping Use    Vaping status: Never Used   Substance and Sexual Activity    Alcohol use: Not Currently     Comment: Denies alcohol use - As per [employer]     Drug use: Not Currently    Sexual activity: Not on file     E-Cigarette/Vaping    E-Cigarette Use Never User      E-Cigarette/Vaping Substances     Family history non-contributory  Social History:  Marital Status: /Civil Union   Occupation: Retired  Patient Pre-hospital Living Situation: Home  Patient Pre-hospital Level of Mobility: walks  Patient Pre-hospital  Diet Restrictions: None    Meds/Allergies   I have reviewed home medications with patient family member.  Prior to Admission medications    Medication Sig Start Date End Date Taking? Authorizing Provider   acetaminophen (TYLENOL) 325 mg tablet Take 650 mg by mouth every 6 (six) hours as needed for mild pain  Patient not taking: Reported on 2/11/2025    Historical Provider, MD   albuterol (ProAir HFA) 90 mcg/act inhaler Inhale 2 puffs every 6 (six) hours as needed for wheezing or shortness of breath  Patient not taking: Reported on 2/11/2025 2/28/24   DAVE Moon   amLODIPine (NORVASC) 5 mg tablet Take 1 tablet (5 mg total) by mouth daily for 20 days  Patient not taking: Reported on 2/11/2025 7/13/24 8/2/24  Shekhar Mensah MD   citalopram (CeleXA) 20 mg tablet Take 1 tablet daily  Patient not taking: Reported on 2/11/2025 11/30/21   Hugh Brannon MD   divalproex sodium (DEPAKOTE SPRINKLE) 125 MG capsule Take 250 mg by mouth 2 (two) times a day 1/23/25 2/22/25  Historical Provider, MD   DULoxetine (CYMBALTA) 30 mg delayed release capsule Take 1 capsule by mouth in the morning 1/23/25   Historical Provider, MD   ferrous sulfate 325 (65 Fe) mg tablet Take 325 mg by mouth daily with breakfast  Patient not taking: Reported on 2/11/2025    Historical Provider, MD   guaiFENesin (MUCINEX PO) Take by mouth  Patient not taking: Reported on 2/11/2025    Historical Provider, MD   LORazepam (Ativan) 0.5 mg tablet Take 1 tablet (0.5 mg total) by mouth 3 (three) times a day as needed for anxiety or sleep for up to 10 days  Patient not taking: Reported on 2/11/2025 8/28/24 9/7/24  Kevin Sorto DO   meclizine (ANTIVERT) 25 mg tablet Take 1 tablet (25 mg total) by mouth 3 (three) times a day as needed for dizziness  Patient not taking: Reported on 2/11/2025 7/13/24   Shekhar Mensah MD   mometasone (ELOCON) 0.1 % cream Apply topically daily for 5 days  Patient not taking: Reported on 2/11/2025 7/22/18 1/31/24   Esau Loving DO   OLANZapine (ZyPREXA) 7.5 mg tablet Take 7.5 mg by mouth 1/23/25 2/22/25  Historical Provider, MD   omeprazole (PriLOSEC) 20 mg delayed release capsule Take 1 capsule (20 mg total) by mouth daily  Patient not taking: Reported on 2/11/2025 5/24/21   Hugh Brannon MD     No Known Allergies    Objective :  Temp:  [96.8 °F (36 °C)-99.3 °F (37.4 °C)] 98.4 °F (36.9 °C)  HR:  [] 99  BP: ()/(51-59) 99/54  Resp:  [20-24] 24  SpO2:  [83 %-94 %] 83 %  O2 Device: Nasal cannula  Nasal Cannula O2 Flow Rate (L/min):  [2 L/min] 2 L/min    Physical Exam  Constitutional:       Appearance: She is normal weight. She is ill-appearing.   HENT:      Head: Normocephalic and atraumatic.      Nose: Nose normal.      Mouth/Throat:      Mouth: Mucous membranes are moist.      Pharynx: Oropharynx is clear.   Eyes:      Extraocular Movements: Extraocular movements intact.      Conjunctiva/sclera: Conjunctivae normal.   Cardiovascular:      Rate and Rhythm: Normal rate and regular rhythm.      Pulses: Normal pulses.      Heart sounds: Normal heart sounds.   Pulmonary:      Breath sounds: Wheezing and rhonchi present.      Comments: Decreased breath sounds  Abdominal:      General: There is no distension.      Palpations: Abdomen is soft.      Tenderness: There is no abdominal tenderness. There is no guarding.   Musculoskeletal:         General: No swelling.      Right lower leg: No edema.      Left lower leg: No edema.   Skin:     General: Skin is warm and dry.   Neurological:      General: No focal deficit present.      Mental Status: She is alert and oriented to person, place, and time. Mental status is at baseline.   Psychiatric:         Mood and Affect: Mood normal.         Behavior: Behavior normal.         Thought Content: Thought content normal.               Lab Results: I have reviewed the following results:  Results from last 7 days   Lab Units 02/11/25  1823   WBC Thousand/uL 17.09*   HEMOGLOBIN  "g/dL 11.9   HEMATOCRIT % 35.7   PLATELETS Thousands/uL 330   SEGS PCT % 87*   LYMPHO PCT % 4*   MONO PCT % 8   EOS PCT % 0     Results from last 7 days   Lab Units 02/11/25  1823   SODIUM mmol/L 137   POTASSIUM mmol/L 3.5   CHLORIDE mmol/L 103   CO2 mmol/L 25   BUN mg/dL 19   CREATININE mg/dL 1.09   ANION GAP mmol/L 9   CALCIUM mg/dL 8.7   GLUCOSE RANDOM mg/dL 127             No results found for: \"HGBA1C\"  Results from last 7 days   Lab Units 02/11/25  2045 02/11/25  1823   LACTIC ACID mmol/L 1.8  --    PROCALCITONIN ng/ml  --  1.01*       Imaging Results Review: I reviewed radiology reports from this admission including: CT chest.  Other Study Results Review: EKG was reviewed.     Administrative Statements     Medical decision making: High  Diagnosis addressed: Illness/injury posing threat to life or bodily function with multifocal pneumonia, respiratory failure and sepsis  Data:   Reviewed  CBC, CMP, Pro-Semaj, lactic acid, viral panel, CT chest  Ordered CBC, BMP, Pro-Semaj  Reviewed external notes from urgent care  Discussion of management with ER provider: IV fluids, antibiotics, breathing treatment          Risk:  Prescription drug management:-Biotics, IV steroids, breathing treatments  Discussion for hospitalization with ER provider: Requires admission for respiratory failure, sepsis multifocal pneumonia        ** Please Note: This note has been constructed using a voice recognition system. **    "

## 2025-02-12 NOTE — ASSESSMENT & PLAN NOTE
CT chest showing multifocal pneumonia with dense consolidation in both lower lobes  We will do Rocephin and Zithromax  Urine strep and Legionella  Mucinex  Breathing treatments  Solu-Medrol 40 mg IV every 8 hours

## 2025-02-12 NOTE — PLAN OF CARE
Problem: Potential for Falls  Goal: Patient will remain free of falls  Description: INTERVENTIONS:  - Educate patient/family on patient safety including physical limitations  - Instruct patient to call for assistance with activity   - Consult OT/PT to assist with strengthening/mobility   - Keep Call bell within reach  - Keep bed low and locked with side rails adjusted as appropriate  - Keep care items and personal belongings within reach  - Initiate and maintain comfort rounds  - Make Fall Risk Sign visible to staff  - Offer Toileting every 2 Hours, in advance of need  - Initiate/Maintain   alarm  - Obtain necessary fall risk management equipment:     - Apply yellow socks and bracelet for high fall risk patients  - Consider moving patient to room near nurses station  Outcome: Progressing     Problem: PAIN - ADULT  Goal: Verbalizes/displays adequate comfort level or baseline comfort level  Description: Interventions:  - Encourage patient to monitor pain and request assistance  - Assess pain using appropriate pain scale  - Administer analgesics based on type and severity of pain and evaluate response  - Implement non-pharmacological measures as appropriate and evaluate response  - Consider cultural and social influences on pain and pain management  - Notify physician/advanced practitioner if interventions unsuccessful or patient reports new pain  Outcome: Progressing     Problem: INFECTION - ADULT  Goal: Absence or prevention of progression during hospitalization  Description: INTERVENTIONS:  - Assess and monitor for signs and symptoms of infection  - Monitor lab/diagnostic results  - Monitor all insertion sites, i.e. indwelling lines, tubes, and drains  - Monitor endotracheal if appropriate and nasal secretions for changes in amount and color  - [place] appropriate cooling/warming therapies per order  - Administer medications as ordered  - Instruct and encourage patient and family to use good hand hygiene  technique  - Identify and instruct in appropriate isolation precautions for identified infection/condition  Outcome: Progressing     Problem: SAFETY ADULT  Goal: Patient will remain free of falls  Description: INTERVENTIONS:  - Educate patient/family on patient safety including physical limitations  - Instruct patient to call for assistance with activity   - Consult OT/PT to assist with strengthening/mobility   - Keep Call bell within reach  - Keep bed low and locked with side rails adjusted as appropriate  - Keep care items and personal belongings within reach  - Initiate and maintain comfort rounds  - Make Fall Risk Sign visible to staff  - Offer Toileting every    Hours, in advance of need  - Initiate/Maintain   alarm  - Obtain necessary fall risk management equipment:     - Apply yellow socks and bracelet for high fall risk patients  - Consider moving patient to room near nurses station  Outcome: Progressing     Problem: DISCHARGE PLANNING  Goal: Discharge to home or other facility with appropriate resources  Description: INTERVENTIONS:  - Identify barriers to discharge w/patient and caregiver  - Arrange for needed discharge resources and transportation as appropriate  - Identify discharge learning needs (meds, wound care, etc.)  - Arrange for interpretive services to assist at discharge as needed  - Refer to Case Management Department for coordinating discharge planning if the patient needs post-hospital services based on physician/advanced practitioner order or complex needs related to functional status, cognitive ability, or social support system  Outcome: Progressing     Problem: Knowledge Deficit  Goal: Patient/family/caregiver demonstrates understanding of disease process, treatment plan, medications, and discharge instructions  Description: Complete learning assessment and assess knowledge base.  Interventions:  - Provide teaching at level of understanding  - Provide teaching via preferred learning  methods  Outcome: Progressing     Problem: RESPIRATORY - ADULT  Goal: Achieves optimal ventilation and oxygenation  Description: INTERVENTIONS:  - Assess for changes in respiratory status  - Assess for changes in mentation and behavior  - Position to facilitate oxygenation and minimize respiratory effort  - Oxygen administered by appropriate delivery if ordered  - Initiate smoking cessation education as indicated  - Encourage broncho-pulmonary hygiene including cough, deep breathe, Incentive Spirometry  - Assess the need for suctioning and aspirate as needed  - Assess and instruct to report SOB or any respiratory difficulty  - Respiratory Therapy support as indicated  Outcome: Progressing

## 2025-02-13 ENCOUNTER — APPOINTMENT (INPATIENT)
Dept: NON INVASIVE DIAGNOSTICS | Facility: HOSPITAL | Age: 75
DRG: 871 | End: 2025-02-13
Payer: MEDICARE

## 2025-02-13 PROBLEM — R79.89 ELEVATED TROPONIN: Status: ACTIVE | Noted: 2025-02-13

## 2025-02-13 PROBLEM — F41.9 ANXIETY: Status: ACTIVE | Noted: 2025-02-13

## 2025-02-13 PROBLEM — I51.81 TAKOTSUBO CARDIOMYOPATHY: Status: ACTIVE | Noted: 2025-02-13

## 2025-02-13 LAB
4HR DELTA HS TROPONIN: 665 NG/L
ANION GAP SERPL CALCULATED.3IONS-SCNC: 6 MMOL/L (ref 4–13)
ANISOCYTOSIS BLD QL SMEAR: PRESENT
AORTIC VALVE MEAN VELOCITY: 9.7 M/S
ARTERIAL PATENCY WRIST A: YES
ATRIAL RATE: 122 BPM
ATRIAL RATE: 99 BPM
AV AREA BY CONTINUOUS VTI: 2.7 CM2
AV AREA PEAK VELOCITY: 2.7 CM2
AV LVOT MEAN GRADIENT: 5 MMHG
AV LVOT PEAK GRADIENT: 7 MMHG
AV MEAN PRESS GRAD SYS DOP V1V2: 4 MMHG
AV ORIFICE AREA US: 2.68 CM2
AV PEAK GRADIENT: 6 MMHG
AV VELOCITY RATIO: 1.05
AV VMAX SYS DOP: 1.27 M/S
BASE EXCESS BLDA CALC-SCNC: 3.7 MMOL/L
BASO STIPL BLD QL SMEAR: PRESENT
BASOPHILS # BLD MANUAL: 0 THOUSAND/UL (ref 0–0.1)
BASOPHILS NFR MAR MANUAL: 0 % (ref 0–1)
BSA FOR ECHO PROCEDURE: 1.59 M2
BUN SERPL-MCNC: 20 MG/DL (ref 5–25)
CA-I BLD-SCNC: 1.17 MMOL/L (ref 1.12–1.32)
CALCIUM SERPL-MCNC: 8.4 MG/DL (ref 8.4–10.2)
CARDIAC TROPONIN I PNL SERPL HS: 1006 NG/L (ref 8–18)
CARDIAC TROPONIN I PNL SERPL HS: 1116 NG/L (ref ?–50)
CHLORIDE SERPL-SCNC: 108 MMOL/L (ref 96–108)
CO2 SERPL-SCNC: 28 MMOL/L (ref 21–32)
CREAT SERPL-MCNC: 0.68 MG/DL (ref 0.6–1.3)
DOP CALC AO VTI: 21.68 CM
DOP CALC LVOT AREA: 2.54 CM2
DOP CALC LVOT CARDIAC INDEX: 3.78 L/MIN/M2
DOP CALC LVOT CARDIAC OUTPUT: 6.01 L/MIN
DOP CALC LVOT DIAMETER: 1.8 CM
DOP CALC LVOT PEAK VEL VTI: 22.84 CM
DOP CALC LVOT PEAK VEL: 1.34 M/S
DOP CALC LVOT STROKE INDEX: 37.7 ML/M2
DOP CALC LVOT STROKE VOLUME: 58.09
E WAVE DECELERATION TIME: 76 MS
EOSINOPHIL # BLD MANUAL: 0 THOUSAND/UL (ref 0–0.4)
EOSINOPHIL NFR BLD MANUAL: 0 % (ref 0–6)
ERYTHROCYTE [DISTWIDTH] IN BLOOD BY AUTOMATED COUNT: 16.5 % (ref 11.6–15.1)
FRACTIONAL SHORTENING: 24 (ref 28–44)
GFR SERPL CREATININE-BSD FRML MDRD: 86 ML/MIN/1.73SQ M
GIANT PLATELETS BLD QL SMEAR: PRESENT
GLUCOSE SERPL-MCNC: 119 MG/DL (ref 65–140)
HCO3 BLDA-SCNC: 28.5 MMOL/L (ref 22–28)
HCT VFR BLD AUTO: 28.7 % (ref 34.8–46.1)
HGB BLD-MCNC: 9.3 G/DL (ref 11.5–15.4)
INTERVENTRICULAR SEPTUM IN DIASTOLE (PARASTERNAL SHORT AXIS VIEW): 1.6 CM
INTERVENTRICULAR SEPTUM: 1.6 CM (ref 0.6–1.1)
LAAS-AP2: 16.6 CM2
LAAS-AP4: 11.4 CM2
LEFT ATRIUM AREA SYSTOLE SINGLE PLANE A4C: 11 CM2
LEFT ATRIUM SIZE: 3.4 CM
LEFT ATRIUM VOLUME (MOD BIPLANE): 36 ML
LEFT ATRIUM VOLUME INDEX (MOD BIPLANE): 22.6 ML/M2
LEFT INTERNAL DIMENSION IN SYSTOLE: 3.1 CM (ref 2.1–4)
LEFT VENTRICLE DIASTOLIC VOLUME (MOD BIPLANE): 85 ML
LEFT VENTRICLE DIASTOLIC VOLUME INDEX (MOD BIPLANE): 53.5 ML/M2
LEFT VENTRICLE SYSTOLIC VOLUME (MOD BIPLANE): 57 ML
LEFT VENTRICLE SYSTOLIC VOLUME INDEX (MOD BIPLANE): 35.8 ML/M2
LEFT VENTRICULAR INTERNAL DIMENSION IN DIASTOLE: 4.1 CM (ref 3.5–6)
LEFT VENTRICULAR POSTERIOR WALL IN END DIASTOLE: 1.3 CM
LEFT VENTRICULAR STROKE VOLUME: 37 ML
LV EF BIPLANE MOD: 34 %
LV EF US.2D.A4C+ESTIMATED: 31 %
LVSV (TEICH): 37 ML
LYMPHOCYTES # BLD AUTO: 0.97 THOUSAND/UL (ref 0.6–4.47)
LYMPHOCYTES # BLD AUTO: 4 % (ref 14–44)
MAGNESIUM SERPL-MCNC: 1.9 MG/DL (ref 1.9–2.7)
MCH RBC QN AUTO: 29.5 PG (ref 26.8–34.3)
MCHC RBC AUTO-ENTMCNC: 32.4 G/DL (ref 31.4–37.4)
MCV RBC AUTO: 91 FL (ref 82–98)
MONOCYTES # BLD AUTO: 0.97 THOUSAND/UL (ref 0–1.22)
MONOCYTES NFR BLD: 4 % (ref 4–12)
MV E'TISSUE VEL-LAT: 19 CM/S
MV E'TISSUE VEL-SEP: 3 CM/S
MV PEAK E VEL: 113 CM/S
MV STENOSIS PRESSURE HALF TIME: 22 MS
MV VALVE AREA P 1/2 METHOD: 10
NEUTROPHILS # BLD MANUAL: 22.25 THOUSAND/UL (ref 1.85–7.62)
NEUTS BAND NFR BLD MANUAL: 13 % (ref 0–8)
NEUTS SEG NFR BLD AUTO: 79 % (ref 43–75)
O2 CT BLDA-SCNC: 17.4 ML/DL (ref 16–23)
OXYHGB MFR BLDA: 92.9 % (ref 94–97)
P AXIS: 74 DEGREES
P AXIS: 88 DEGREES
PCO2 BLDA: 44 MM HG (ref 36–44)
PH BLDA: 7.43 [PH] (ref 7.35–7.45)
PHOSPHATE SERPL-MCNC: 3.4 MG/DL (ref 2.3–4.1)
PLATELET # BLD AUTO: 297 THOUSANDS/UL (ref 149–390)
PLATELET BLD QL SMEAR: ADEQUATE
PMV BLD AUTO: 10.5 FL (ref 8.9–12.7)
PO2 BLDA: 66.8 MM HG (ref 75–129)
POIKILOCYTOSIS BLD QL SMEAR: PRESENT
POLYCHROMASIA BLD QL SMEAR: PRESENT
POTASSIUM SERPL-SCNC: 3.8 MMOL/L (ref 3.5–5.3)
PR INTERVAL: 164 MS
PR INTERVAL: 166 MS
QRS AXIS: -11 DEGREES
QRS AXIS: 73 DEGREES
QRSD INTERVAL: 112 MS
QRSD INTERVAL: 90 MS
QT INTERVAL: 318 MS
QT INTERVAL: 380 MS
QTC INTERVAL: 453 MS
QTC INTERVAL: 487 MS
RBC # BLD AUTO: 3.15 MILLION/UL (ref 3.81–5.12)
RBC MORPH BLD: PRESENT
RIGHT ATRIUM AREA SYSTOLE A4C: 7.9 CM2
RIGHT VENTRICLE ID DIMENSION: 3.5 CM
SL CV LEFT ATRIUM LENGTH A2C: 4.7 CM
SL CV LV EF: 15
SL CV PED ECHO LEFT VENTRICLE DIASTOLIC VOLUME (MOD BIPLANE) 2D: 75 ML
SL CV PED ECHO LEFT VENTRICLE SYSTOLIC VOLUME (MOD BIPLANE) 2D: 38 ML
SODIUM SERPL-SCNC: 142 MMOL/L (ref 135–147)
SPECIMEN SOURCE: ABNORMAL
T WAVE AXIS: 5 DEGREES
T WAVE AXIS: 70 DEGREES
TARGETS BLD QL SMEAR: PRESENT
TOXIC GRANULES BLD QL SMEAR: PRESENT
TR MAX PG: 18 MMHG
TR PEAK VELOCITY: 2.1 M/S
TRICUSPID ANNULAR PLANE SYSTOLIC EXCURSION: 2 CM
TRICUSPID VALVE PEAK REGURGITATION VELOCITY: 2.14 M/S
VENTRICULAR RATE: 122 BPM
VENTRICULAR RATE: 99 BPM
WBC # BLD AUTO: 24.18 THOUSAND/UL (ref 4.31–10.16)
WBC TOXIC VACUOLES BLD QL SMEAR: PRESENT

## 2025-02-13 PROCEDURE — 94660 CPAP INITIATION&MGMT: CPT

## 2025-02-13 PROCEDURE — 83735 ASSAY OF MAGNESIUM: CPT | Performed by: PHYSICIAN ASSISTANT

## 2025-02-13 PROCEDURE — 80048 BASIC METABOLIC PNL TOTAL CA: CPT | Performed by: PHYSICIAN ASSISTANT

## 2025-02-13 PROCEDURE — 99233 SBSQ HOSP IP/OBS HIGH 50: CPT | Performed by: STUDENT IN AN ORGANIZED HEALTH CARE EDUCATION/TRAINING PROGRAM

## 2025-02-13 PROCEDURE — 85007 BL SMEAR W/DIFF WBC COUNT: CPT | Performed by: PHYSICIAN ASSISTANT

## 2025-02-13 PROCEDURE — 93010 ELECTROCARDIOGRAM REPORT: CPT | Performed by: INTERNAL MEDICINE

## 2025-02-13 PROCEDURE — 93306 TTE W/DOPPLER COMPLETE: CPT

## 2025-02-13 PROCEDURE — 82805 BLOOD GASES W/O2 SATURATION: CPT

## 2025-02-13 PROCEDURE — 93306 TTE W/DOPPLER COMPLETE: CPT | Performed by: INTERNAL MEDICINE

## 2025-02-13 PROCEDURE — 85027 COMPLETE CBC AUTOMATED: CPT | Performed by: PHYSICIAN ASSISTANT

## 2025-02-13 PROCEDURE — 93005 ELECTROCARDIOGRAM TRACING: CPT

## 2025-02-13 PROCEDURE — 84100 ASSAY OF PHOSPHORUS: CPT | Performed by: PHYSICIAN ASSISTANT

## 2025-02-13 PROCEDURE — 84484 ASSAY OF TROPONIN QUANT: CPT | Performed by: PHYSICIAN ASSISTANT

## 2025-02-13 PROCEDURE — NC001 PR NO CHARGE: Performed by: STUDENT IN AN ORGANIZED HEALTH CARE EDUCATION/TRAINING PROGRAM

## 2025-02-13 PROCEDURE — 94760 N-INVAS EAR/PLS OXIMETRY 1: CPT

## 2025-02-13 PROCEDURE — 82330 ASSAY OF CALCIUM: CPT | Performed by: PHYSICIAN ASSISTANT

## 2025-02-13 PROCEDURE — 99222 1ST HOSP IP/OBS MODERATE 55: CPT | Performed by: INTERNAL MEDICINE

## 2025-02-13 PROCEDURE — 94003 VENT MGMT INPAT SUBQ DAY: CPT

## 2025-02-13 PROCEDURE — 94640 AIRWAY INHALATION TREATMENT: CPT

## 2025-02-13 RX ORDER — ALBUMIN (HUMAN) 12.5 G/50ML
12.5 SOLUTION INTRAVENOUS ONCE
Status: COMPLETED | OUTPATIENT
Start: 2025-02-13 | End: 2025-02-13

## 2025-02-13 RX ORDER — LORAZEPAM 2 MG/ML
0.5 INJECTION INTRAMUSCULAR EVERY 6 HOURS PRN
Status: DISCONTINUED | OUTPATIENT
Start: 2025-02-13 | End: 2025-02-16

## 2025-02-13 RX ORDER — CARVEDILOL 3.12 MG/1
3.12 TABLET ORAL 2 TIMES DAILY WITH MEALS
Status: DISCONTINUED | OUTPATIENT
Start: 2025-02-13 | End: 2025-02-20 | Stop reason: HOSPADM

## 2025-02-13 RX ORDER — FUROSEMIDE 10 MG/ML
40 INJECTION INTRAMUSCULAR; INTRAVENOUS ONCE
Status: COMPLETED | OUTPATIENT
Start: 2025-02-13 | End: 2025-02-13

## 2025-02-13 RX ORDER — MAGNESIUM SULFATE HEPTAHYDRATE 40 MG/ML
2 INJECTION, SOLUTION INTRAVENOUS ONCE
Status: COMPLETED | OUTPATIENT
Start: 2025-02-13 | End: 2025-02-13

## 2025-02-13 RX ORDER — LORAZEPAM 2 MG/ML
0.5 INJECTION INTRAMUSCULAR ONCE
Status: COMPLETED | OUTPATIENT
Start: 2025-02-13 | End: 2025-02-13

## 2025-02-13 RX ORDER — POTASSIUM CHLORIDE 1500 MG/1
20 TABLET, EXTENDED RELEASE ORAL ONCE
Status: DISCONTINUED | OUTPATIENT
Start: 2025-02-13 | End: 2025-02-13

## 2025-02-13 RX ORDER — FUROSEMIDE 10 MG/ML
40 INJECTION INTRAMUSCULAR; INTRAVENOUS
Status: DISCONTINUED | OUTPATIENT
Start: 2025-02-13 | End: 2025-02-14

## 2025-02-13 RX ORDER — POTASSIUM CHLORIDE 14.9 MG/ML
20 INJECTION INTRAVENOUS ONCE
Status: COMPLETED | OUTPATIENT
Start: 2025-02-13 | End: 2025-02-13

## 2025-02-13 RX ADMIN — EMPAGLIFLOZIN 10 MG: 10 TABLET, FILM COATED ORAL at 10:22

## 2025-02-13 RX ADMIN — FUROSEMIDE 40 MG: 10 INJECTION, SOLUTION INTRAMUSCULAR; INTRAVENOUS at 16:21

## 2025-02-13 RX ADMIN — IPRATROPIUM BROMIDE 0.5 MG: 0.5 SOLUTION RESPIRATORY (INHALATION) at 13:36

## 2025-02-13 RX ADMIN — IPRATROPIUM BROMIDE 0.5 MG: 0.5 SOLUTION RESPIRATORY (INHALATION) at 09:10

## 2025-02-13 RX ADMIN — ALBUMIN (HUMAN) 12.5 G: 0.25 INJECTION, SOLUTION INTRAVENOUS at 23:07

## 2025-02-13 RX ADMIN — ACETAMINOPHEN 325MG 650 MG: 325 TABLET ORAL at 19:52

## 2025-02-13 RX ADMIN — METHYLPREDNISOLONE SODIUM SUCCINATE 40 MG: 40 INJECTION, POWDER, FOR SOLUTION INTRAMUSCULAR; INTRAVENOUS at 05:50

## 2025-02-13 RX ADMIN — DEXMEDETOMIDINE HYDROCHLORIDE 0.2 MCG/KG/HR: 400 INJECTION INTRAVENOUS at 00:03

## 2025-02-13 RX ADMIN — AZITHROMYCIN MONOHYDRATE 500 MG: 500 INJECTION, POWDER, LYOPHILIZED, FOR SOLUTION INTRAVENOUS at 22:26

## 2025-02-13 RX ADMIN — LEVALBUTEROL HYDROCHLORIDE 1.25 MG: 1.25 SOLUTION RESPIRATORY (INHALATION) at 09:10

## 2025-02-13 RX ADMIN — GUAIFENESIN 600 MG: 600 TABLET ORAL at 19:17

## 2025-02-13 RX ADMIN — BUDESONIDE INHALATION 0.5 MG: 0.5 SUSPENSION RESPIRATORY (INHALATION) at 20:48

## 2025-02-13 RX ADMIN — CARVEDILOL 3.12 MG: 3.12 TABLET, FILM COATED ORAL at 10:21

## 2025-02-13 RX ADMIN — MAGNESIUM SULFATE HEPTAHYDRATE 2 G: 40 INJECTION, SOLUTION INTRAVENOUS at 10:31

## 2025-02-13 RX ADMIN — DIVALPROEX SODIUM 250 MG: 125 CAPSULE, COATED PELLETS ORAL at 19:17

## 2025-02-13 RX ADMIN — LORAZEPAM 0.5 MG: 2 INJECTION INTRAMUSCULAR; INTRAVENOUS at 16:19

## 2025-02-13 RX ADMIN — ENOXAPARIN SODIUM 40 MG: 40 INJECTION SUBCUTANEOUS at 10:29

## 2025-02-13 RX ADMIN — OLANZAPINE 7.5 MG: 2.5 TABLET, FILM COATED ORAL at 21:24

## 2025-02-13 RX ADMIN — LORAZEPAM 0.5 MG: 2 INJECTION INTRAMUSCULAR; INTRAVENOUS at 10:17

## 2025-02-13 RX ADMIN — IPRATROPIUM BROMIDE 0.5 MG: 0.5 SOLUTION RESPIRATORY (INHALATION) at 20:48

## 2025-02-13 RX ADMIN — CEFTRIAXONE 1000 MG: 1 INJECTION, SOLUTION INTRAVENOUS at 19:19

## 2025-02-13 RX ADMIN — METHYLPREDNISOLONE SODIUM SUCCINATE 40 MG: 40 INJECTION, POWDER, FOR SOLUTION INTRAMUSCULAR; INTRAVENOUS at 15:07

## 2025-02-13 RX ADMIN — AZITHROMYCIN MONOHYDRATE 500 MG: 500 INJECTION, POWDER, LYOPHILIZED, FOR SOLUTION INTRAVENOUS at 00:04

## 2025-02-13 RX ADMIN — LEVALBUTEROL HYDROCHLORIDE 1.25 MG: 1.25 SOLUTION RESPIRATORY (INHALATION) at 20:48

## 2025-02-13 RX ADMIN — LEVALBUTEROL HYDROCHLORIDE 1.25 MG: 1.25 SOLUTION RESPIRATORY (INHALATION) at 13:36

## 2025-02-13 RX ADMIN — NOREPINEPHRINE BITARTRATE 2 MCG/MIN: 1 INJECTION, SOLUTION, CONCENTRATE INTRAVENOUS at 23:32

## 2025-02-13 RX ADMIN — POTASSIUM CHLORIDE 20 MEQ: 200 INJECTION, SOLUTION INTRAVENOUS at 05:50

## 2025-02-13 RX ADMIN — FUROSEMIDE 40 MG: 10 INJECTION, SOLUTION INTRAMUSCULAR; INTRAVENOUS at 05:51

## 2025-02-13 RX ADMIN — METHYLPREDNISOLONE SODIUM SUCCINATE 40 MG: 40 INJECTION, POWDER, FOR SOLUTION INTRAMUSCULAR; INTRAVENOUS at 21:24

## 2025-02-13 RX ADMIN — DIVALPROEX SODIUM 250 MG: 125 CAPSULE, COATED PELLETS ORAL at 10:21

## 2025-02-13 RX ADMIN — BUDESONIDE INHALATION 0.5 MG: 0.5 SUSPENSION RESPIRATORY (INHALATION) at 09:10

## 2025-02-13 RX ADMIN — GUAIFENESIN 600 MG: 600 TABLET ORAL at 10:22

## 2025-02-13 RX ADMIN — DULOXETINE HYDROCHLORIDE 30 MG: 30 CAPSULE, DELAYED RELEASE ORAL at 10:21

## 2025-02-13 NOTE — ASSESSMENT & PLAN NOTE
No formal diagnosis of COPD the patient has smoked on and off for 30 years  Daughter states she is a closet smoker  Continue IV Rocephin and Zithromax  IV Solu-Medrol 40 mg IV every 8 hours  Respiratory protocol  Xopenex/atrovent and Pulmicort

## 2025-02-13 NOTE — RESPIRATORY THERAPY NOTE
02/13/25 0953   Respiratory Assessment   Resp Comments Pt placed on bipap at 1000.  Pt appeared SOB and anxious. ABG drawn

## 2025-02-13 NOTE — ASSESSMENT & PLAN NOTE
Patient is on appropriate antibiotics.  Just prior history and exam, she appears to have COPD as well.

## 2025-02-13 NOTE — ASSESSMENT & PLAN NOTE
It is unclear whether patient already had existing LV systolic dysfunction and went to acute pulmonary edema because of aggressive IV hydration or whether she developed acute Takotsubo cardiomyopathy leading to flash pulmonary edema.

## 2025-02-13 NOTE — RESPIRATORY THERAPY NOTE
02/12/25 1845   Respiratory Assessment   Resp Comments Pt placed on BIPAP 14/6, rate=14, FiO2=50% upon admission to .

## 2025-02-13 NOTE — ASSESSMENT & PLAN NOTE
Her troponin elevation could be explained by possible Takotsubo cardiomyopathy.  Not suggestive of acute coronary syndrome.

## 2025-02-13 NOTE — PLAN OF CARE
Problem: Potential for Falls  Goal: Patient will remain free of falls  Description: INTERVENTIONS:  - Educate patient/family on patient safety including physical limitations  - Instruct patient to call for assistance with activity   - Consult OT/PT to assist with strengthening/mobility   - Keep Call bell within reach  - Keep bed low and locked with side rails adjusted as appropriate  - Keep care items and personal belongings within reach  - Initiate and maintain comfort rounds  - Make Fall Risk Sign visible to staff  - Offer Toileting every 2 Hours, in advance of need  - Initiate/Maintain bed/chair alarm  - Obtain necessary fall risk management equipment: na  - Apply yellow socks and bracelet for high fall risk patients  - Consider moving patient to room near nurses station  Outcome: Progressing     Problem: PAIN - ADULT  Goal: Verbalizes/displays adequate comfort level or baseline comfort level  Description: Interventions:  - Encourage patient to monitor pain and request assistance  - Assess pain using appropriate pain scale  - Administer analgesics based on type and severity of pain and evaluate response  - Implement non-pharmacological measures as appropriate and evaluate response  - Consider cultural and social influences on pain and pain management  - Notify physician/advanced practitioner if interventions unsuccessful or patient reports new pain  Outcome: Progressing     Problem: INFECTION - ADULT  Goal: Absence or prevention of progression during hospitalization  Description: INTERVENTIONS:  - Assess and monitor for signs and symptoms of infection  - Monitor lab/diagnostic results  - Monitor all insertion sites, i.e. indwelling lines, tubes, and drains  - Monitor endotracheal if appropriate and nasal secretions for changes in amount and color  - [place] appropriate cooling/warming therapies per order  - Administer medications as ordered  - Instruct and encourage patient and family to use good hand hygiene  technique  - Identify and instruct in appropriate isolation precautions for identified infection/condition  Outcome: Progressing     Problem: SAFETY ADULT  Goal: Patient will remain free of falls  Description: INTERVENTIONS:  - Educate patient/family on patient safety including physical limitations  - Instruct patient to call for assistance with activity   - Consult OT/PT to assist with strengthening/mobility   - Keep Call bell within reach  - Keep bed low and locked with side rails adjusted as appropriate  - Keep care items and personal belongings within reach  - Initiate and maintain comfort rounds  - Make Fall Risk Sign visible to staff  - Offer Toileting every 2 Hours, in advance of need  - Initiate/Maintain bed/chair alarm  - Obtain necessary fall risk management equipment: na  - Apply yellow socks and bracelet for high fall risk patients  - Consider moving patient to room near nurses station  Outcome: Progressing     Problem: DISCHARGE PLANNING  Goal: Discharge to home or other facility with appropriate resources  Description: INTERVENTIONS:  - Identify barriers to discharge w/patient and caregiver  - Arrange for needed discharge resources and transportation as appropriate  - Identify discharge learning needs (meds, wound care, etc.)  - Arrange for interpretive services to assist at discharge as needed  - Refer to Case Management Department for coordinating discharge planning if the patient needs post-hospital services based on physician/advanced practitioner order or complex needs related to functional status, cognitive ability, or social support system  Outcome: Progressing     Problem: Knowledge Deficit  Goal: Patient/family/caregiver demonstrates understanding of disease process, treatment plan, medications, and discharge instructions  Description: Complete learning assessment and assess knowledge base.  Interventions:  - Provide teaching at level of understanding  - Provide teaching via preferred learning  methods  Outcome: Progressing     Problem: RESPIRATORY - ADULT  Goal: Achieves optimal ventilation and oxygenation  Description: INTERVENTIONS:  - Assess for changes in respiratory status  - Assess for changes in mentation and behavior  - Position to facilitate oxygenation and minimize respiratory effort  - Oxygen administered by appropriate delivery if ordered  - Initiate smoking cessation education as indicated  - Encourage broncho-pulmonary hygiene including cough, deep breathe, Incentive Spirometry  - Assess the need for suctioning and aspirate as needed  - Assess and instruct to report SOB or any respiratory difficulty  - Respiratory Therapy support as indicated  Outcome: Progressing

## 2025-02-13 NOTE — ASSESSMENT & PLAN NOTE
Went into acute respiratory failure  Increased vascular congestion chest x-ray  Fluffy infiltrate  Lasix 40 mg IV  Cardiogram is pending  Tachycardia induced flash pulmonary edema possible , CHF

## 2025-02-13 NOTE — PROGRESS NOTES
Progress Note - Critical Care/ICU   Name: Bea Perez 74 y.o. female I MRN: [id number]  Unit/Bed#: -01 I Date of Admission: 2/11/2025   Date of Service: 2/13/2025 I Hospital Day: 2      Assessment & Plan  Multifocal pneumonia  CT chest shows multifocal pneumonia with dense consolidation in both lower lobes  Was started on Rocephin and Zithromax  Urine strep and Legionella was sent  Check MRSA  Is on Mucinex and breathing treatments  Solu-Medrol 40 mg IV every 8  Keep oxygenation greater than 88%  Fluctuate between BiPAP and nasal cannula  Current moderate episode of major depressive disorder without prior episode (HCC)  Continue with Depakote, Zyprexa and Cymbalta.   Bronchitis  No formal diagnosis of COPD the patient has smoked on and off for 30 years  Daughter states she is a closet smoker  Continue IV Rocephin and Zithromax  IV Solu-Medrol 40 mg IV every 8 hours  Respiratory protocol  Xopenex/atrovent and Pulmicort  Acute hypoxic respiratory failure (HCC)  Patient went into acute hypoxic respiratory failure was a rapid response  Appears to be volume overloaded with Rales throughout  40 mg IV in the rapid  Continue to monitor  Troponin BNP was sent  Follow-up of labs  Echocardiogram is pending  Concern for CHF  Sepsis with acute hypoxic respiratory failure (HCC)  See treatment under multifocal pneumonia  Flash pulmonary edema (HCC)  Went into acute respiratory failure  Increased vascular congestion chest x-ray  Fluffy infiltrate  Lasix 40 mg IV  Cardiogram is pending  Tachycardia induced flash pulmonary edema possible , CHF   Anxiety  Patient received all the medications but complained of anxiety with BiPAP  Started on Precedex 0.2 for hours of sleep  Can be DC in the am   Elevated troponin  Most likely demand ischemia  To flash pulmonary edema  Troponin high was 1151  Echocardiogram is pending  Cardiology  consult    Disposition: Stepdown Level 1    ICU Core Measures     A: Assess, Prevent, and Manage  Pain Has pain been assessed? NA  Need for changes to pain regimen? NA   B: Both SAT/SAT  N/A   C: Choice of Sedation RASS Goal: 0 Alert and Calm  Need for changes to sedation or analgesia regimen? NA   D: Delirium CAM-ICU: Negative   E: Early Mobility  Plan for early mobility? Yes   F: Family Engagement Plan for family engagement today? Yes       Antibiotic Review: Awaiting culture results.     Review of Invasive Devices:    [place] Plan: Continue for accurate I/O monitoring for 48 hours        Prophylaxis:  VTE VTE covered by:  enoxaparin, Subcutaneous, 40 mg at 02/12/25 0812       Stress Ulcer  not orderedcovered byomeprazole (PriLOSEC) 20 mg delayed release capsule [[id number]] (Long-Term Med)         24 Hour Events :     Patient was started on Precedex, due to her anxiety  Continue to trend troponin until max was likely demand flash pulmonary edema max 1151 trending down   Echocardiogram is pending  Lasix 40 mg IV was given she is -960, probably would benefit from another dose in the a.m. as BP allows   +2 L as admission    Subjective   Review of Systems: Review of Systems   Respiratory:  Positive for cough, shortness of breath and wheezing.    Psychiatric/Behavioral:  The patient is nervous/anxious.    All other systems reviewed and are negative.      Objective :      Doing better on the bipap                  Vitals I/O      Most Recent Min/Max in 24hrs   Temp (!) 97.3 °F (36.3 °C) Temp  Min: 97.3 °F (36.3 °C)  Max: 99.6 °F (37.6 °C)   Pulse 99 Pulse  Min: 89  Max: 132   Resp 21 Resp  Min: 18  Max: 50   BP 96/53 BP  Min: 94/50  Max: 176/126   O2 Sat 98 % SpO2  Min: 88 %  Max: 98 %      Intake/Output Summary (Last 24 hours) at 2/13/2025 0532  Last data filed at 2/13/2025 0406  Gross per 24 hour   Intake 360.94 ml   Output 1235 ml   Net -874.06 ml       Diet Regular; Regular House    Invasive Monitoring           Physical Exam   Physical Exam  Vitals and nursing note reviewed.   Eyes:      Extraocular Movements:  Extraocular movements intact.      Pupils: Pupils are equal, round, and reactive to light.   Skin:     General: Skin is warm, dry and not mottled extremities.      Capillary Refill: Capillary refill takes less than 2 seconds.      Coloration: Skin is not pale.   HENT:      Head: Normocephalic and atraumatic.   Neck:      Vascular: No JVD.   Cardiovascular:      Rate and Rhythm: Regular rhythm. Tachycardia present.      Pulses: Normal pulses.      Heart sounds: Murmur heard.   Musculoskeletal:         General: Normal range of motion.      Cervical back: Full passive range of motion without pain, normal range of motion and neck supple.   Abdominal: General: Bowel sounds are normal.      Palpations: Abdomen is soft.   Constitutional:       General: She is awake.      Appearance: She is well-developed and well-nourished.   Pulmonary:      Effort: Tachypnea present.      Breath sounds: No transmitted upper airway sounds. Examination of the right-lower field reveals decreased breath sounds. Examination of the left-lower field reveals decreased breath sounds. Decreased breath sounds, rhonchi and rales present. No wheezing.   Psychiatric:         Behavior: Behavior is cooperative.   Neurological:      General: No focal deficit present.      Mental Status: She is alert, easily aroused and oriented to person, place and time. Mental status is at baseline.      Sensory: Sensation is intact.      Motor: gross motor function is at baseline for patient. Strength full and intact in all extremities.          Diagnostic Studies        Lab Results: I have reviewed the following results:     Medications:  Scheduled PRN   azithromycin, 500 mg, Q24H  budesonide, 0.5 mg, Q12H  cefTRIAXone, 1,000 mg, Q24H  divalproex sodium, 250 mg, BID  DULoxetine, 30 mg, Daily  enoxaparin, 40 mg, Daily  furosemide, 40 mg, Once  guaiFENesin, 600 mg, BID  influenza vaccine, 0.5 mL, Once  ipratropium, 0.5 mg, TID  levalbuterol, 1.25 mg,  TID  methylPREDNISolone sodium succinate, 40 mg, Q8H BHARATH  OLANZapine, 7.5 mg, HS      acetaminophen, 650 mg, Q6H PRN       Continuous    dexmedetomidine, 0.1-0.7 mcg/kg/hr, Last Rate: 0.1 mcg/kg/hr (02/13/25 0337)         Labs:   CBC    Recent Labs     02/12/25 1931 02/13/25 0449   WBC 29.16* 24.18*   HGB 10.8* 9.3*   HCT 32.9* 28.7*    297     BMP    Recent Labs     02/12/25 1931 02/13/25  0449   SODIUM 140 142   K 4.3 3.8    108   CO2 27 28   AGAP 7 6   BUN 18 20   CREATININE 0.80 0.68   CALCIUM 8.6 8.4       Coags    No recent results     Additional Electrolytes  Recent Labs     02/12/25 1931 02/12/25 2017 02/13/25 0449   MG 2.0  --  1.9   PHOS 4.6*  --  3.4   CAIONIZED  --  1.14 1.17          Blood Gas    No recent results  Recent Labs     02/12/25 1931   PHVEN 7.261*   [id number] 52.8*   PO2VEN 21.6*   [id number] 23.2*   BEVEN -4.1   [id number] 30.7*    LFTs  Recent Labs     02/12/25 1931   ALT 25   AST 44*   ALKPHOS 86   ALB 3.1*   TBILI 0.31       Infectious  Recent Labs     02/11/25 1823 02/12/25  0551   PROCALCITONI 1.01* 0.52*     Glucose  Recent Labs     02/11/25 1823 02/12/25 0551 02/12/25 1931 02/13/25 0449   GLUC 127 128 198* 119        Administrative Statements

## 2025-02-13 NOTE — PROGRESS NOTES
Progress Note - Critical Care/ICU   Name: Bea Perez 74 y.o. female I MRN: [id number]  Unit/Bed#: -01 I Date of Admission: 2/11/2025   Date of Service: 2/12/2025 I Hospital Day: 1      Assessment & Plan  Multifocal pneumonia  CT chest shows multifocal pneumonia with dense consolidation in both lower lobes  Was started on Rocephin and Zithromax  Urine strep and Legionella was sent  Check MRSA  Is on Mucinex and breathing treatments  Solu-Medrol 40 mg IV every 8  Keep oxygenation greater than 88%  Fluctuate between BiPAP and nasal cannula  Current moderate episode of major depressive disorder without prior episode (HCC)  Continue with Depakote, Zyprexa and Cymbalta.   Bronchitis  No formal diagnosis of COPD the patient has smoked on and off for 30 years  Daughter states she is a closet smoker  Continue IV Rocephin and Zithromax  IV Solu-Medrol 40 mg IV every 8 hours  Respiratory protocol  Xopenex/atrovent and Pulmicort  Acute hypoxic respiratory failure (HCC)  Patient went into acute hypoxic respiratory failure was a rapid response  Appears to be volume overloaded with Rales throughout  40 mg IV in the rapid  Continue to monitor  Troponin BNP was sent  Follow-up of labs  Echocardiogram is pending  Concern for CHF  Sepsis with acute hypoxic respiratory failure (HCC)  See treatment under multifocal pneumonia  Flash pulmonary edema (HCC)  Went into acute respiratory failure  Increased vascular congestion chest x-ray  Fluffy infiltrate  Lasix 40 mg IV  Cardiogram is pending  Tachycardia induced flash pulmonary edema possible , CHF   Disposition: Stepdown Level 1    ICU Core Measures     A: Assess, Prevent, and Manage Pain Has pain been assessed? Yes  Need for changes to pain regimen? NA   B: Both SAT/SAT  N/A   C: Choice of Sedation RASS Goal: 0 Alert and Calm or N/A patient not on sedation  Need for changes to sedation or analgesia regimen? NA   D: Delirium CAM-ICU: Negative   E: Early Mobility  Plan for early  mobility? Yes   F: Family Engagement Plan for family engagement today? Yes       Antibiotic Review: Awaiting culture results.     Review of Invasive Devices:    [place] Plan: Continue for accurate I/O monitoring for 48 hours        Prophylaxis:  VTE VTE covered by:  enoxaparin, Subcutaneous, 40 mg at 02/12/25 0812       Stress Ulcer  not orderedcovered byomeprazole (PriLOSEC) 20 mg delayed release capsule [[id number]] (Long-Term Med)         24 Hour Events :   74-year-old female who was admitted with multifocal pneumonia, acute hypoxic respiratory failure and sepsis she was admitted approximately 24 hours ago received in the ER to 30 mL/kg's of fluid requirements and was on 75 an hour of fluids, rapid response was called for shortness of breath and a chest x-ray was performed with look like pulmonary edema with vascular congestion.   Patient received Lasix 40 mg IV started on BiPAP and transferred to stepdown 1 in the ICU.     Subjective   Review of Systems: Review of Systems   Constitutional:  Positive for chills, fatigue and fever.   Respiratory:  Positive for cough, shortness of breath and wheezing.    Cardiovascular:  Negative for chest pain, palpitations and leg swelling.   Gastrointestinal:  Negative for abdominal pain.   Genitourinary:  Negative for difficulty urinating and dyspareunia.   Musculoskeletal:  Negative for back pain and gait problem.   Neurological:  Negative for dizziness, light-headedness and headaches.   All other systems reviewed and are negative.      Objective :        Patient was transferred to the ICU felt a lot better after placed on BiPAP                 Vitals I/O      Most Recent Min/Max in 24hrs   Temp 98.4 °F (36.9 °C) Temp  Min: 97.2 °F (36.2 °C)  Max: 99.6 °F (37.6 °C)   Pulse (!) 109 Pulse  Min: 89  Max: 132   Resp (!) 30 Resp  Min: 18  Max: 30   /57 BP  Min: 99/54  Max: 176/126   O2 Sat 97 % SpO2  Min: 83 %  Max: 97 %      Intake/Output Summary (Last 24 hours) at 2/12/2025  1957  Last data filed at 2/12/2025 1945  Gross per 24 hour   Intake 3182 ml   Output 600 ml   Net 2582 ml       Diet Regular; Regular House    Invasive Monitoring           Physical Exam   Physical Exam  Vitals and nursing note reviewed.   Eyes:      Extraocular Movements: Extraocular movements intact.      Pupils: Pupils are equal, round, and reactive to light.   Skin:     General: Skin is warm, dry and not mottled extremities.      Capillary Refill: Capillary refill takes less than 2 seconds.      Coloration: Skin is pale.   HENT:      Head: Normocephalic and atraumatic.   Cardiovascular:      Rate and Rhythm: Regular rhythm. Tachycardia present.      Pulses: Normal pulses.      Heart sounds: Normal heart sounds.   Musculoskeletal:         General: Normal range of motion.      Cervical back: Full passive range of motion without pain, normal range of motion and neck supple.   Abdominal: General: Bowel sounds are normal.      Palpations: Abdomen is soft.   Constitutional:       General: She is awake.      Appearance: She is well-developed, normal weight and well-nourished. She is ill-appearing.      Interventions: Nasal cannula and face mask in place.   Pulmonary:      Effort: Tachypnea present.      Breath sounds: Examination of the right-lower field reveals decreased breath sounds. Examination of the left-lower field reveals decreased breath sounds. Decreased breath sounds, wheezing, rhonchi and rales present.   Psychiatric:         Behavior: Behavior is cooperative.   Neurological:      General: No focal deficit present.      Mental Status: She is alert, easily aroused and oriented to person, place and time. Mental status is at baseline.      Sensory: Sensation is intact.      Motor: gross motor function is at baseline for patient. Strength full and intact in all extremities.   Genitourinary/Anorectal:  [place] present.        Diagnostic Studies        Lab Results: I have reviewed the following results:      Medications:  Scheduled PRN   azithromycin, 500 mg, Q24H  budesonide, 0.5 mg, Q12H  cefTRIAXone, 1,000 mg, Q24H  divalproex sodium, 250 mg, BID  DULoxetine, 30 mg, Daily  enoxaparin, 40 mg, Daily  furosemide, 40 mg, Once  guaiFENesin, 600 mg, BID  influenza vaccine, 0.5 mL, Once  ipratropium, 0.5 mg, TID  levalbuterol, 1.25 mg, TID  methylPREDNISolone sodium succinate, 40 mg, Q8H BHARATH  OLANZapine, 7.5 mg, HS      acetaminophen, 650 mg, Q6H PRN       Continuous          Labs:   CBC    Recent Labs     02/12/25  0551 02/12/25  1931   WBC 21.25* 29.16*   HGB 11.6 10.8*   HCT 35.3 32.9*    320     BMP    Recent Labs     02/11/25 1823 02/12/25  0551   SODIUM 137 143   K 3.5 3.7    110*   CO2 25 26   AGAP 9 7   BUN 19 18   CREATININE 1.09 0.78   CALCIUM 8.7 8.6       Coags    No recent results     Additional Electrolytes  No recent results       Blood Gas    No recent results  Recent Labs     02/12/25  1931   PHVEN 7.261*   [id number] 52.8*   PO2VEN 21.6*   [id number] 23.2*   BEVEN -4.1   T7HQWHV 30.7*    LFTs  No recent results    Infectious  Recent Labs     02/11/25 1823 02/12/25  0551   PROCALCITONI 1.01* 0.52*     Glucose  Recent Labs     02/11/25 1823 02/12/25  0551   GLUC 127 128        Administrative Statements   I have spent a total time of 30 minutes in caring for this patient on the day of the visit/encounter including Diagnostic results, Importance of tx compliance, Impressions, Counseling / Coordination of care, Documenting in the medical record, Reviewing / ordering tests, medicine, procedures  , and Obtaining or reviewing history  .

## 2025-02-13 NOTE — ASSESSMENT & PLAN NOTE
CT chest shows multifocal pneumonia with dense consolidation in both lower lobes  Was started on Rocephin and Zithromax  Urine strep and Legionella was sent  Check MRSA  Is on Mucinex and breathing treatments  Solu-Medrol 40 mg IV every 8  Keep oxygenation greater than 88%  Fluctuate between BiPAP and nasal cannula

## 2025-02-13 NOTE — HOSPITAL COURSE
75 y/o  F hx anxiety, depression who presented with SOB and was admitted for sepsis due to multifocal PNA. Initially, on SLIM she was given fluid resuscitation and abx coverage with ceftriaxone and azithromycin. On 2/12 PM RRT called for respiratory distress 2/2 flash pulmonary edema. Given IV lasix and started BiPAP, transferred to ICU. Echo on 2/13 Am showed LVEF 15%, akinetic apical myocardium consistent with Takotsubo cardiomyopathy. Transiently required low dose levophed on 2/13-2/14. Cards on consult, started GDMT w/ Jardiance and carvedilol, added lisinopril today. Continuing diuresis with lasix 40 PO daily targeting +/- 500. She's on nasal cannula and plan is for her to have repeat echo on Monday to assess LV function, will likely require life vest at discharge. She'll complete 7 days rocephin on 2/18.

## 2025-02-13 NOTE — ASSESSMENT & PLAN NOTE
Patient received all the medications but complained of anxiety with BiPAP  Started on Precedex 0.2 for hours of sleep  Can be DC in the am

## 2025-02-13 NOTE — ASSESSMENT & PLAN NOTE
Most likely demand ischemia  To flash pulmonary edema  Troponin high was 1151  Echocardiogram is pending  Cardiology  consult

## 2025-02-13 NOTE — ASSESSMENT & PLAN NOTE
Echocardiogram from today showed dilated and ballooned out LV cavity with EF of 15%, suggestive of Takotsubo cardiomyopathy.  No prior study for comparison.  Cannot exclude underlying CAD and prior infarct.  Her ECGs from July 2024 had shown septal infarct pattern.  Recommend initiate lowest possible dose of carvedilol in view of her borderline blood pressure.  Aim would be to initiate 2.5 of lisinopril in the next day or 2 if blood pressure allows.  I would also recommend starting her on Jardiance for now.  Continue low-dose diuretics but would caution in view of borderline blood pressure.

## 2025-02-13 NOTE — PLAN OF CARE
Problem: Potential for Falls  Goal: Patient will remain free of falls  Description: INTERVENTIONS:  - Educate patient/family on patient safety including physical limitations  - Instruct patient to call for assistance with activity   - Consult OT/PT to assist with strengthening/mobility   - Keep Call bell within reach  - Keep bed low and locked with side rails adjusted as appropriate  - Keep care items and personal belongings within reach  - Initiate and maintain comfort rounds  - Make Fall Risk Sign visible to staff  - Apply yellow socks and bracelet for high fall risk patients  - Consider moving patient to room near nurses station  Outcome: Progressing     Problem: PAIN - ADULT  Goal: Verbalizes/displays adequate comfort level or baseline comfort level  Description: Interventions:  - Encourage patient to monitor pain and request assistance  - Assess pain using appropriate pain scale  - Administer analgesics based on type and severity of pain and evaluate response  - Implement non-pharmacological measures as appropriate and evaluate response  - Consider cultural and social influences on pain and pain management  - Notify physician/advanced practitioner if interventions unsuccessful or patient reports new pain  Outcome: Progressing     Problem: INFECTION - ADULT  Goal: Absence or prevention of progression during hospitalization  Description: INTERVENTIONS:  - Assess and monitor for signs and symptoms of infection  - Monitor lab/diagnostic results  - Monitor all insertion sites, i.e. indwelling lines, tubes, and drains  - Monitor endotracheal if appropriate and nasal secretions for changes in amount and color  - [place] appropriate cooling/warming therapies per order  - Administer medications as ordered  - Instruct and encourage patient and family to use good hand hygiene technique  - Identify and instruct in appropriate isolation precautions for identified infection/condition  Outcome: Progressing     Problem:  SAFETY ADULT  Goal: Patient will remain free of falls  Description: INTERVENTIONS:  - Educate patient/family on patient safety including physical limitations  - Instruct patient to call for assistance with activity   - Consult OT/PT to assist with strengthening/mobility   - Keep Call bell within reach  - Keep bed low and locked with side rails adjusted as appropriate  - Keep care items and personal belongings within reach  - Initiate and maintain comfort rounds  - Make Fall Risk Sign visible to staff  - Consider moving patient to room near nurses station  Outcome: Progressing     Problem: DISCHARGE PLANNING  Goal: Discharge to home or other facility with appropriate resources  Description: INTERVENTIONS:  - Identify barriers to discharge w/patient and caregiver  - Arrange for needed discharge resources and transportation as appropriate  - Identify discharge learning needs (meds, wound care, etc.)  - Arrange for interpretive services to assist at discharge as needed  - Refer to Case Management Department for coordinating discharge planning if the patient needs post-hospital services based on physician/advanced practitioner order or complex needs related to functional status, cognitive ability, or social support system  Outcome: Progressing     Problem: Knowledge Deficit  Goal: Patient/family/caregiver demonstrates understanding of disease process, treatment plan, medications, and discharge instructions  Description: Complete learning assessment and assess knowledge base.  Interventions:  - Provide teaching at level of understanding  - Provide teaching via preferred learning methods  Outcome: Progressing     Problem: RESPIRATORY - ADULT  Goal: Achieves optimal ventilation and oxygenation  Description: INTERVENTIONS:  - Assess for changes in respiratory status  - Assess for changes in mentation and behavior  - Position to facilitate oxygenation and minimize respiratory effort  - Oxygen administered by appropriate  delivery if ordered  - Initiate smoking cessation education as indicated  - Encourage broncho-pulmonary hygiene including cough, deep breathe, Incentive Spirometry  - Assess the need for suctioning and aspirate as needed  - Assess and instruct to report SOB or any respiratory difficulty  - Respiratory Therapy support as indicated  Outcome: Progressing     Problem: Pneumonia  Goal: Patient pulmonary care needs met without signs/symptoms of complications  Description: Patient will demonstrate a return to baseline pulmonary function with resolution of infection within admission.  Outcome: Progressing

## 2025-02-13 NOTE — ASSESSMENT & PLAN NOTE
A/e/b echo 2/13 EF 15%, G1DD, akinetic apical myocardium   In the setting of anxiety, depression, home stressors   Fluid overloaded on 2/12 requiring BiPAP and Lasix  Cardiology on consult   Start Carvedilol 3.125mg daily and Jardiance daily   Plan to start lisinopril 2/14 as pressures tolerate   Continue with gentle diuresis with lasix to target euvolemic and net balance even to slightly negative   Repeat echo 2/17  Continuous cardiopulmonary monitoring   Pt calling because she is still sick and wanting to know if she can see Dr. Fonseca today.

## 2025-02-13 NOTE — RESPIRATORY THERAPY NOTE
RT Protocol Note  Bea Perez 74 y.o. female MRN: [id number]  Unit/Bed#: -01 Encounter: [id number]    Assessment    Principal Problem:    Multifocal pneumonia  Active Problems:    Current moderate episode of major depressive disorder without prior episode (HCC)    Bronchitis    Acute hypoxic respiratory failure (HCC)    Sepsis with acute hypoxic respiratory failure (HCC)    Flash pulmonary edema (HCC)    Anxiety      Home Pulmonary Medications:         Past Medical History:   Diagnosis Date    Depression     Esophageal stricture     History of gastroesophageal reflux (GERD)     Iron deficiency anemia     Meniere disease      Social History     Socioeconomic History    Marital status: /Civil Union     Spouse name: None    Number of children: None    Years of education: None    Highest education level: None   Occupational History    None   Tobacco Use    Smoking status: Some Days     Types: Cigarettes    Smokeless tobacco: Never   Vaping Use    Vaping status: Never Used   Substance and Sexual Activity    Alcohol use: Not Currently     Comment: Denies alcohol use - As per [employer]     Drug use: Not Currently    Sexual activity: None   Other Topics Concern    None   Social History Narrative    None     Social Drivers of Health     Financial Resource Strain: Low Risk  (12/17/2024)    Received from [facility]    Overall Financial Resource Strain (CARDIA)     Difficulty of Paying Living Expenses: Not hard at all   Food Insecurity: No Food Insecurity (2/11/2025)    Nursing - Inadequate Food Risk Classification     Worried About Running Out of Food in the Last Year: Not on file     Ran Out of Food in the Last Year: Not on file     Ran Out of Food in the Last Year: Never true   Transportation Needs: No Transportation Needs (2/11/2025)    Nursing - Transportation Risk Classification     Lack of Transportation: Not on file     Lack of Transportation: No   Physical Activity: Not on file   Stress:  "No Stress Concern Present (2024)    Received from [facility]    [place] [employer] - Occupational Stress Questionnaire     Feeling of Stress : Only a little   Social Connections: Unknown (2024)    Received from [facility]    Social [employer]     How often do you feel lonely or isolated from those around you? (Adult - for ages 18 years and over): Not on file   Intimate Partner Violence: Unknown (2025)    Nursing IPS     Feels Physically and Emotionally Safe: Not on file     Physically Hurt by Someone: Not on file     Humiliated or Emotionally Abused by Someone: Not on file     Physically Hurt by Someone: No     Hurt or Threatened by Someone: No   Housing Stability: Unknown (2025)    Nursing: Inadequate Housing Risk Classification     Has Housing: Not on file     Worried About Losing Housing: Not on file     Unable to Get Utilities: Not on file     Unable to Pay for Housing in the Last Year: No     Has Housin       Subjective         Objective    Physical Exam:   Assessment Type: Assess only  General Appearance: Alert, Awake  Respiratory Pattern: Normal, Dyspnea with exertion  Chest Assessment: Chest expansion symmetrical  Bilateral Breath Sounds: Crackles, Expiratory wheezes  Cough: Productive  O2 Device: bipap    Vitals:  Blood pressure 101/59, pulse 102, temperature 98.2 °F (36.8 °C), temperature source Temporal, resp. rate (!) 23, height 5' 2\" (1.575 m), weight 58.5 kg (129 lb), SpO2 98%.          Imaging and other studies: Results Review Statement: No pertinent imaging studies reviewed.    O2 Device: bipap     Plan    Respiratory Plan: Mild Distress pathway        Resp Comments: Pt sdmitted for SOB/Sepsis/PNA.  Pt required bipap s/p RRT for pulmonary Edema  - pt on bipap 14/6 @ 50% -tolerating well.  BD dim/crackle/mild exp wheeze. Pt periodically taking mask off to expectorate small thick secretions.  VSS/SpO2 98%   "

## 2025-02-13 NOTE — ASSESSMENT & PLAN NOTE
Patient went into acute hypoxic respiratory failure was a rapid response  Appears to be volume overloaded with Rales throughout  40 mg IV in the rapid  Continue to monitor  Troponin BNP was sent  Follow-up of labs  Echocardiogram is pending  Concern for CHF

## 2025-02-13 NOTE — CONSULTS
Patients Name: Bea Perez   YOB: 1950   [id number]   [facility] Consult    ASSESSMENT AND RECOMMENDATIONS:    Assessment & Plan  Takotsubo cardiomyopathy  Echocardiogram from today showed dilated and ballooned out LV cavity with EF of 15%, suggestive of Takotsubo cardiomyopathy.  No prior study for comparison.  Cannot exclude underlying CAD and prior infarct.  Her ECGs from July 2024 had shown septal infarct pattern.  Recommend initiate lowest possible dose of carvedilol in view of her borderline blood pressure.  Aim would be to initiate 2.5 of lisinopril in the next day or 2 if blood pressure allows.  I would also recommend starting her on Jardiance for now.  Continue low-dose diuretics but would caution in view of borderline blood pressure.  Elevated troponin  Her troponin elevation could be explained by possible Takotsubo cardiomyopathy.  Not suggestive of acute coronary syndrome.  Multifocal pneumonia  Patient is on appropriate antibiotics.  Flash pulmonary edema (HCC)  It is unclear whether patient already had existing LV systolic dysfunction and went to acute pulmonary edema because of aggressive IV hydration or whether she developed acute Takotsubo cardiomyopathy leading to flash pulmonary edema.  Sepsis with acute hypoxic respiratory failure (HCC)  Patient is on appropriate antibiotics.  Just prior history and exam, she appears to have COPD as well.    CC:   Shortness of breath, productive cough  HPI:   74-year-old female with past medical history significant for chronic cigarette smoking and undiagnosed COPD, chronic anxiety and depression, GERD with esophageal stricture, presented to the hospital with productive cough and worsening dyspnea on exertion for several days prior to presentation.  She was found to be hypoxic on arrival.  Patient denied any chest pain, palpitations or syncope.  CT chest showed multifocal pneumonia with consolidation in both lower lobes.  Patient  received aggressive IV hydration and went into flash pulmonary edema yesterday requiring transfer to ICU.    Past Medical History:   Diagnosis Date    Depression     Esophageal stricture     History of gastroesophageal reflux (GERD)     Iron deficiency anemia     Meniere disease        Past Surgical History:   Procedure Laterality Date    COLONOSCOPY  2012    [place] LUMBAR PUNCTURE DIAGNOSTIC  12/19/2024    GALLBLADDER SURGERY      HYSTERECTOMY      OTHER SURGICAL HISTORY      Endoscopy of stricture 2010, 2012, 5/2014 - Esophageal ulcer; 7/2014 - Esophageal ulcer; 10/2015 (Dr. Jones) - Esophageal stricture; 10/2016; 11/2017 - Stricture        Social History     Tobacco Use    Smoking status: Some Days     Types: Cigarettes    Smokeless tobacco: Never   Vaping Use    Vaping status: Never Used   Substance Use Topics    Alcohol use: Not Currently     Comment: Denies alcohol use - As per [employer]     Drug use: Not Currently        No Known Allergies     Family History   Problem Relation Age of Onset    Stroke Mother     Hypertension Mother     Emphysema Father           Current Facility-Administered Medications:     acetaminophen (TYLENOL) tablet 650 mg, 650 mg, Oral, Q6H PRN, Jermaine Prescott MD, 650 mg at 02/12/25 1348    azithromycin (ZITHROMAX) 500 mg in sodium chloride 0.9 % 250 mL IVPB, 500 mg, Intravenous, Q24H, Jermaine Prescott MD, Last Rate: 250 mL/hr at 02/13/25 0004, 500 mg at 02/13/25 0004    budesonide (PULMICORT) inhalation solution 0.5 mg, 0.5 mg, Nebulization, Q12H, Jermaine Prescott MD, 0.5 mg at 02/13/25 0910    carvedilol (COREG) tablet 3.125 mg, 3.125 mg, Oral, BID With Meals, Babak Kidd PA-C    cefTRIAXone (ROCEPHIN) IVPB (premix in dextrose) 1,000 mg 50 mL, 1,000 mg, Intravenous, Q24H, Neo Campbell PA-C, Last Rate: 100 mL/hr at 02/12/25 1801, 1,000 mg at 02/12/25 1801    dexmedeTOMIDine (Precedex) 400 mcg in sodium chloride 0.9% 100 mL, 0.1-0.7 mcg/kg/hr, Intravenous,  Titrated, Neo Campbell PA-C, Last Rate: 1.5 mL/hr at 02/13/25 0337, 0.1 mcg/kg/hr at 02/13/25 0337    divalproex sodium (DEPAKOTE SPRINKLE) capsule 250 mg, 250 mg, Oral, BID, Jermaine Prescott MD, 250 mg at 02/12/25 1753    DULoxetine (CYMBALTA) delayed release capsule 30 mg, 30 mg, Oral, Daily, Jermaine Prescott MD, 30 mg at 02/12/25 0813    Empagliflozin (JARDIANCE) tablet 10 mg, 10 mg, Oral, Daily, Babak Kdid PA-C    enoxaparin (LOVENOX) subcutaneous injection 40 mg, 40 mg, Subcutaneous, Daily, Jermaine Prescott MD, 40 mg at 02/12/25 0812    furosemide (LASIX) injection 40 mg, 40 mg, Intravenous, Once, Neo Campbell PA-C    guaiFENesin (MUCINEX) 12 hr tablet 600 mg, 600 mg, Oral, BID, Jermiane Prescott MD, 600 mg at 02/12/25 1753    influenza vaccine, high-dose (Fluzone High-Dose) IM injection 0.5 mL, 0.5 mL, Intramuscular, Once, Neo Campbell PA-C    ipratropium (ATROVENT) 0.02 % inhalation solution 0.5 mg, 0.5 mg, Nebulization, TID, Neo Campbell PA-C, 0.5 mg at 02/13/25 0910    levalbuterol (XOPENEX) inhalation solution 1.25 mg, 1.25 mg, Nebulization, TID, 1.25 mg at 02/13/25 0910 **AND** [DISCONTINUED] sodium chloride 0.9 % inhalation solution 3 mL, 3 mL, Nebulization, TID, Jermaine Prescott MD    magnesium sulfate 2 g/50 mL IVPB (premix) 2 g, 2 g, Intravenous, Once, Babak Kidd PA-C    methylPREDNISolone sodium succinate (Solu-MEDROL) injection 40 mg, 40 mg, Intravenous, Q8H BHARATH, HAWA SonC, 40 mg at 02/13/25 0550    OLANZapine (ZyPREXA) tablet 7.5 mg, 7.5 mg, Oral, HS, Neo Campbell PA-C, 7.5 mg at 02/12/25 2232    perflutren lipid microsphere (DEFINITY) injection, 0.4 mL/min, Intravenous, Once in imaging, Babak Kidd PA-C     Lab Results   Component Value Date    LDLCALC 162 (H) 08/27/2019    HDL 44 08/27/2019    TRIG 180 (H) 08/27/2019    CHOLESTEROL 242 (H) 08/27/2019    CREATININE 0.68 02/13/2025     SODIUM 142 02/13/2025    K 3.8 02/13/2025    TSH 1 12/16/2024    HSTNI 1,006 (H) 02/13/2025         Results for orders placed or performed during the hospital encounter of 02/11/25   ECG 12 lead   Result Value    Ventricular Rate 122    Atrial Rate 122    CO Interval 166    QRSD Interval 112    QT Interval 318    QTC Interval 453    P Axis 74    QRS Axis -11    T Wave Axis 70    Narrative    Sinus tachycardia with occasional Premature ventricular complexes  Anteroseptal infarct (cited on or before 13-Jul-2024)  age undeterminate  Abnormal ECG  When compared with ECG of 12-Feb-2025 14:46,  Premature ventricular complexes are now Present  QRS axis Shifted right  ST more elevated in Anterior leads  Confirmed by Charanjit Lizama ([id number]) on 2/13/2025 7:31:47 AM       I have personally reviewed the ECG from February 13, 2024 which showed sinus rhythm with PACs and anteroseptal infarct age undetermined.  REVIEW OF SYSTEMS   Positive for: Dyspnea, productive cough  Negative for: All remaining as reviewed below and in HPI.    SYSTEM SYMPTOMS REVIEWED:  General--weight change, fever, night sweats  Respiratory--cough, wheezing, shortness of breath, sputum production  Cardiovascular--chest pain, syncope, dyspnea on exertion, edema, decline in exercise tolerance, claudication   Gastrointestinal--persistent vomiting, diarrhea, abdominal distention, blood in stool   Muscular or skeletal--joint pain or swelling   Neurologic--headaches, syncope, abnormal movement  Hematologic--history of easy bruising and bleeding   Endocrine--thyroid enlargement, heat or cold intolerance, polyuria   Psychiatric--anxiety, depression     General physical examination:    General appearance: Alert, mild respiratory distress, appears older than stated age, normal weight.  HEENT: Mucous membranes are moist.  No obvious abnormality noted.  Neck: Supple with no lymphadenopathy.  No JVD.  Carotid pulses are intact.  No carotid bruit.  Cardiovascular system:  "Regular rhythm.  Distant heart sounds.  No murmurs.  No rubs or gallops. Extremities: No edema. No cyanosis.  Pulmonary: Respirations mildly labored.  Reduced air entry bilaterally.  Scattered coarse crackles bilaterally   gastrointestinal: Abdomen is soft and nontender.  Bowel sounds are positive.  Musculoskeletal: Upper Extremities: Normal upper motor strength. Lower Extremity: Normal motor strength.   Skin: Skin is warm. No rashes or lesions.  Neurological: Patient is alert and oriented with no gross motor deficits.  Psychiatric: Mood is anxious.  Behavior is normal.    Vitals:    02/13/25 0500 02/13/25 0600 02/13/25 0749 02/13/25 0913   BP: 101/51 98/55 90/55    BP Location:   Right arm    Pulse: (!) 117 (!) 110 (!) 109    Resp: (!) 25 21 (!) 27    Temp:   97.8 °F (36.6 °C)    TempSrc:   Tympanic    SpO2: 95% 97% 99% 93%   Weight:   58.5 kg (129 lb)    Height:   5' 2\" (1.575 m)        Body mass index is 23.59 kg/m².       Intake/Output Summary (Last 24 hours) at 2/13/2025 0915  Last data filed at 2/13/2025 0601  Gross per 24 hour   Intake 253.95 ml   Output 1310 ml   Net -1056.05 ml             Thank you for this consult. Please call for any further questions.  Charanjit Lizama MD, FACC, FASE  Attending Cardiologist    Portions of the record  have been created with voice recognition software.  Occasional grammatical mistakes or wrong word or \"sound alike\" substitutions may have occurred due to the inherent limitations of voice recognition software. Please reach out to me directly for any clarifications.     "

## 2025-02-13 NOTE — RAPID RESPONSE
Rapid Response Note  Bea Perez 74 y.o. female MRN: [id number]  Unit/Bed#: -01 Encounter: [id number]    Rapid Response Notification(s):   Response called date/time:  2/12/2025 6:34 PM  Response team arrival date/time:  2/12/2025 6:34 PM  Response end date/time:  2/12/2025 6:54 PM  Level of care:  [place]  [facility]  Primary reason for rapid response call:  Acute change in O2 sat    Rapid Response Intervention(s):   Airway:  None  Breathing:  Oxygen  Circulation:  None  Medications administered:  Furosemide       Assessment:   Pulmonary edema -CHF fluid related   Acute hypoxic respiratory failure  Multifocal pneumonia      Plan:   Consult to ICU level 1 stepdown  Lasix 40 mg IV  BiPAP  [place] insertion monitor output     Rapid Response Outcome:   Transfer:  Transfer to stepdown 1  Primary service notified of transfer: Yes    Code Status: Level 1 (Full Code)      Family notified: Yes, Name of Family member contacted marisa ( )           Background/Situation:   Bea Perez is a 74 y.o. female who was admitted with multifocal pneumonia, acute hypoxic respiratory failure and sepsis she was admitted approximately 24 hours ago received in the ER to 30 mL/kg's of fluid requirements and was on 75 an hour of fluids, rapid response was called for shortness of breath and a chest x-ray was performed with look like pulmonary edema with vascular congestion.   Patient received Lasix 40 mg IV started on BiPAP and transferred to stepdown 1 in the ICU.        Review of Systems   Constitutional:  Positive for chills, fatigue and fever.   Respiratory:  Positive for cough, shortness of breath and wheezing.    Cardiovascular:  Positive for palpitations.   All other systems reviewed and are negative.      Objective:   Vitals:    02/12/25 1845 02/12/25 1900 02/12/25 1915 02/12/25 1939   BP:  136/63 127/56 103/57   BP Location:       Pulse:  (!) 123 (!) 110 (!) 109   Resp: (!) 24 (!) 29 (!) 28  (!) 30   Temp:       TempSrc:       SpO2:  97% 97% 97%   Weight:       Height:         Physical Exam  Vitals and nursing note reviewed.   Constitutional:       General: She is awake.      Appearance: She is normal weight. She is ill-appearing and toxic-appearing.      Interventions: Nasal cannula in place.   HENT:      Head: Normocephalic and atraumatic.   Eyes:      Extraocular Movements: Extraocular movements intact.      Pupils: Pupils are equal, round, and reactive to light.   Cardiovascular:      Rate and Rhythm: Regular rhythm. Tachycardia present.      Heart sounds: Murmur heard.   Pulmonary:      Breath sounds: Wheezing, rhonchi and rales present.   Abdominal:      General: Abdomen is flat.      Palpations: Abdomen is soft.   Musculoskeletal:         General: Normal range of motion.      Cervical back: Neck supple.   Skin:     General: Skin is warm and dry.      Capillary Refill: Capillary refill takes less than 2 seconds.      Coloration: Skin is pale.   Neurological:      Mental Status: She is alert and oriented to person, place, and time. Mental status is at baseline.

## 2025-02-14 ENCOUNTER — TELEPHONE (OUTPATIENT)
Dept: NON INVASIVE DIAGNOSTICS | Facility: HOSPITAL | Age: 75
End: 2025-02-14

## 2025-02-14 PROBLEM — I95.9 HYPOTENSION: Status: RESOLVED | Noted: 2025-02-14 | Resolved: 2025-02-14

## 2025-02-14 PROBLEM — I95.9 HYPOTENSION: Status: ACTIVE | Noted: 2025-02-14

## 2025-02-14 LAB
ANION GAP SERPL CALCULATED.3IONS-SCNC: 4 MMOL/L (ref 4–13)
BUN SERPL-MCNC: 35 MG/DL (ref 5–25)
CA-I BLD-SCNC: 1.14 MMOL/L (ref 1.12–1.32)
CALCIUM SERPL-MCNC: 8.5 MG/DL (ref 8.4–10.2)
CHLORIDE SERPL-SCNC: 107 MMOL/L (ref 96–108)
CO2 SERPL-SCNC: 33 MMOL/L (ref 21–32)
CREAT SERPL-MCNC: 0.8 MG/DL (ref 0.6–1.3)
ERYTHROCYTE [DISTWIDTH] IN BLOOD BY AUTOMATED COUNT: 16.2 % (ref 11.6–15.1)
GFR SERPL CREATININE-BSD FRML MDRD: 72 ML/MIN/1.73SQ M
GLUCOSE SERPL-MCNC: 115 MG/DL (ref 65–140)
HCT VFR BLD AUTO: 30.1 % (ref 34.8–46.1)
HGB BLD-MCNC: 9.9 G/DL (ref 11.5–15.4)
MAGNESIUM SERPL-MCNC: 2.7 MG/DL (ref 1.9–2.7)
MCH RBC QN AUTO: 29.3 PG (ref 26.8–34.3)
MCHC RBC AUTO-ENTMCNC: 32.9 G/DL (ref 31.4–37.4)
MCV RBC AUTO: 89 FL (ref 82–98)
MRSA NOSE QL CULT: NORMAL
PHOSPHATE SERPL-MCNC: 3.7 MG/DL (ref 2.3–4.1)
PLATELET # BLD AUTO: 346 THOUSANDS/UL (ref 149–390)
PMV BLD AUTO: 10 FL (ref 8.9–12.7)
POTASSIUM SERPL-SCNC: 3.6 MMOL/L (ref 3.5–5.3)
RBC # BLD AUTO: 3.38 MILLION/UL (ref 3.81–5.12)
SODIUM SERPL-SCNC: 144 MMOL/L (ref 135–147)
WBC # BLD AUTO: 26.84 THOUSAND/UL (ref 4.31–10.16)

## 2025-02-14 PROCEDURE — 99233 SBSQ HOSP IP/OBS HIGH 50: CPT | Performed by: PHYSICIAN ASSISTANT

## 2025-02-14 PROCEDURE — 99232 SBSQ HOSP IP/OBS MODERATE 35: CPT | Performed by: INTERNAL MEDICINE

## 2025-02-14 PROCEDURE — 82330 ASSAY OF CALCIUM: CPT

## 2025-02-14 PROCEDURE — 94660 CPAP INITIATION&MGMT: CPT

## 2025-02-14 PROCEDURE — 83735 ASSAY OF MAGNESIUM: CPT

## 2025-02-14 PROCEDURE — 84100 ASSAY OF PHOSPHORUS: CPT

## 2025-02-14 PROCEDURE — 80048 BASIC METABOLIC PNL TOTAL CA: CPT

## 2025-02-14 PROCEDURE — 85027 COMPLETE CBC AUTOMATED: CPT

## 2025-02-14 PROCEDURE — 94640 AIRWAY INHALATION TREATMENT: CPT

## 2025-02-14 PROCEDURE — 94760 N-INVAS EAR/PLS OXIMETRY 1: CPT

## 2025-02-14 RX ORDER — POTASSIUM CHLORIDE 20MEQ/15ML
40 LIQUID (ML) ORAL ONCE
Status: COMPLETED | OUTPATIENT
Start: 2025-02-14 | End: 2025-02-14

## 2025-02-14 RX ORDER — FUROSEMIDE 20 MG/1
20 TABLET ORAL DAILY
Status: DISCONTINUED | OUTPATIENT
Start: 2025-02-14 | End: 2025-02-14

## 2025-02-14 RX ORDER — FUROSEMIDE 40 MG/1
40 TABLET ORAL DAILY
Status: DISCONTINUED | OUTPATIENT
Start: 2025-02-15 | End: 2025-02-20 | Stop reason: HOSPADM

## 2025-02-14 RX ORDER — FUROSEMIDE 20 MG/1
20 TABLET ORAL ONCE
Status: COMPLETED | OUTPATIENT
Start: 2025-02-14 | End: 2025-02-14

## 2025-02-14 RX ADMIN — GUAIFENESIN 600 MG: 600 TABLET ORAL at 08:16

## 2025-02-14 RX ADMIN — LEVALBUTEROL HYDROCHLORIDE 1.25 MG: 1.25 SOLUTION RESPIRATORY (INHALATION) at 08:26

## 2025-02-14 RX ADMIN — DULOXETINE HYDROCHLORIDE 30 MG: 30 CAPSULE, DELAYED RELEASE ORAL at 08:16

## 2025-02-14 RX ADMIN — DIVALPROEX SODIUM 250 MG: 125 CAPSULE, COATED PELLETS ORAL at 17:06

## 2025-02-14 RX ADMIN — GUAIFENESIN 600 MG: 600 TABLET ORAL at 17:06

## 2025-02-14 RX ADMIN — ENOXAPARIN SODIUM 40 MG: 40 INJECTION SUBCUTANEOUS at 08:16

## 2025-02-14 RX ADMIN — IPRATROPIUM BROMIDE 0.5 MG: 0.5 SOLUTION RESPIRATORY (INHALATION) at 13:07

## 2025-02-14 RX ADMIN — FUROSEMIDE 20 MG: 20 TABLET ORAL at 08:16

## 2025-02-14 RX ADMIN — LORAZEPAM 0.5 MG: 2 INJECTION INTRAMUSCULAR; INTRAVENOUS at 21:19

## 2025-02-14 RX ADMIN — OLANZAPINE 7.5 MG: 2.5 TABLET, FILM COATED ORAL at 21:19

## 2025-02-14 RX ADMIN — IPRATROPIUM BROMIDE 0.5 MG: 0.5 SOLUTION RESPIRATORY (INHALATION) at 20:32

## 2025-02-14 RX ADMIN — METHYLPREDNISOLONE SODIUM SUCCINATE 40 MG: 40 INJECTION, POWDER, FOR SOLUTION INTRAMUSCULAR; INTRAVENOUS at 06:12

## 2025-02-14 RX ADMIN — LEVALBUTEROL HYDROCHLORIDE 1.25 MG: 1.25 SOLUTION RESPIRATORY (INHALATION) at 20:32

## 2025-02-14 RX ADMIN — EMPAGLIFLOZIN 10 MG: 10 TABLET, FILM COATED ORAL at 08:17

## 2025-02-14 RX ADMIN — METHYLPREDNISOLONE SODIUM SUCCINATE 40 MG: 40 INJECTION, POWDER, FOR SOLUTION INTRAMUSCULAR; INTRAVENOUS at 14:29

## 2025-02-14 RX ADMIN — POTASSIUM CHLORIDE 40 MEQ: 1.5 SOLUTION ORAL at 08:16

## 2025-02-14 RX ADMIN — CEFTRIAXONE 1000 MG: 1 INJECTION, SOLUTION INTRAVENOUS at 19:53

## 2025-02-14 RX ADMIN — BUDESONIDE INHALATION 0.5 MG: 0.5 SUSPENSION RESPIRATORY (INHALATION) at 08:26

## 2025-02-14 RX ADMIN — FUROSEMIDE 20 MG: 20 TABLET ORAL at 09:47

## 2025-02-14 RX ADMIN — DIVALPROEX SODIUM 250 MG: 125 CAPSULE, COATED PELLETS ORAL at 08:16

## 2025-02-14 RX ADMIN — CARVEDILOL 3.12 MG: 3.12 TABLET, FILM COATED ORAL at 08:16

## 2025-02-14 RX ADMIN — LEVALBUTEROL HYDROCHLORIDE 1.25 MG: 1.25 SOLUTION RESPIRATORY (INHALATION) at 13:07

## 2025-02-14 RX ADMIN — CARVEDILOL 3.12 MG: 3.12 TABLET, FILM COATED ORAL at 17:05

## 2025-02-14 RX ADMIN — METHYLPREDNISOLONE SODIUM SUCCINATE 40 MG: 40 INJECTION, POWDER, FOR SOLUTION INTRAMUSCULAR; INTRAVENOUS at 21:19

## 2025-02-14 RX ADMIN — BUDESONIDE INHALATION 0.5 MG: 0.5 SUSPENSION RESPIRATORY (INHALATION) at 20:32

## 2025-02-14 RX ADMIN — IPRATROPIUM BROMIDE 0.5 MG: 0.5 SOLUTION RESPIRATORY (INHALATION) at 08:26

## 2025-02-14 NOTE — PLAN OF CARE
Problem: Potential for Falls  Goal: Patient will remain free of falls  Description: INTERVENTIONS:  - Educate patient/family on patient safety including physical limitations  - Instruct patient to call for assistance with activity   - Consult OT/PT to assist with strengthening/mobility   - Keep Call bell within reach  - Keep bed low and locked with side rails adjusted as appropriate  - Keep care items and personal belongings within reach  - Initiate and maintain comfort rounds  - Make Fall Risk Sign visible to staff  - Offer Toileting every 2 Hours, in advance of need  - Initiate/Maintain bed/chair alarm  - Obtain necessary fall risk management equipment: na  - Apply yellow socks and bracelet for high fall risk patients  - Consider moving patient to room near nurses station  Outcome: Progressing     Problem: PAIN - ADULT  Goal: Verbalizes/displays adequate comfort level or baseline comfort level  Description: Interventions:  - Encourage patient to monitor pain and request assistance  - Assess pain using appropriate pain scale  - Administer analgesics based on type and severity of pain and evaluate response  - Implement non-pharmacological measures as appropriate and evaluate response  - Consider cultural and social influences on pain and pain management  - Notify physician/advanced practitioner if interventions unsuccessful or patient reports new pain  Outcome: Progressing     Problem: INFECTION - ADULT  Goal: Absence or prevention of progression during hospitalization  Description: INTERVENTIONS:  - Assess and monitor for signs and symptoms of infection  - Monitor lab/diagnostic results  - Monitor all insertion sites, i.e. indwelling lines, tubes, and drains  - Monitor endotracheal if appropriate and nasal secretions for changes in amount and color  - [place] appropriate cooling/warming therapies per order  - Administer medications as ordered  - Instruct and encourage patient and family to use good hand hygiene  technique  - Identify and instruct in appropriate isolation precautions for identified infection/condition  Outcome: Progressing     Problem: SAFETY ADULT  Goal: Patient will remain free of falls  Description: INTERVENTIONS:  - Educate patient/family on patient safety including physical limitations  - Instruct patient to call for assistance with activity   - Consult OT/PT to assist with strengthening/mobility   - Keep Call bell within reach  - Keep bed low and locked with side rails adjusted as appropriate  - Keep care items and personal belongings within reach  - Initiate and maintain comfort rounds  - Make Fall Risk Sign visible to staff  - Offer Toileting every 2 Hours, in advance of need  - Initiate/Maintain bed/chair alarm  - Obtain necessary fall risk management equipment: na  - Apply yellow socks and bracelet for high fall risk patients  - Consider moving patient to room near nurses station  Outcome: Progressing     Problem: DISCHARGE PLANNING  Goal: Discharge to home or other facility with appropriate resources  Description: INTERVENTIONS:  - Identify barriers to discharge w/patient and caregiver  - Arrange for needed discharge resources and transportation as appropriate  - Identify discharge learning needs (meds, wound care, etc.)  - Arrange for interpretive services to assist at discharge as needed  - Refer to Case Management Department for coordinating discharge planning if the patient needs post-hospital services based on physician/advanced practitioner order or complex needs related to functional status, cognitive ability, or social support system  Outcome: Progressing     Problem: Knowledge Deficit  Goal: Patient/family/caregiver demonstrates understanding of disease process, treatment plan, medications, and discharge instructions  Description: Complete learning assessment and assess knowledge base.  Interventions:  - Provide teaching at level of understanding  - Provide teaching via preferred learning  methods  Outcome: Progressing     Problem: RESPIRATORY - ADULT  Goal: Achieves optimal ventilation and oxygenation  Description: INTERVENTIONS:  - Assess for changes in respiratory status  - Assess for changes in mentation and behavior  - Position to facilitate oxygenation and minimize respiratory effort  - Oxygen administered by appropriate delivery if ordered  - Initiate smoking cessation education as indicated  - Encourage broncho-pulmonary hygiene including cough, deep breathe, Incentive Spirometry  - Assess the need for suctioning and aspirate as needed  - Assess and instruct to report SOB or any respiratory difficulty  - Respiratory Therapy support as indicated  Outcome: Progressing

## 2025-02-14 NOTE — ASSESSMENT & PLAN NOTE
Retention most likely related due to Takotsubo's and multifocal pneumonia  Started on Levophed 2 - down titrated to 1 ( albumin given no response)   Wean as tolerable

## 2025-02-14 NOTE — PROGRESS NOTES
Progress Note - Critical Care/ICU   Name: Bea Perez 74 y.o. female I MRN: [id number]  Unit/Bed#: -01 I Date of Admission: 2/11/2025   Date of Service: 2/14/2025 I Hospital Day: 3      Assessment & Plan  Multifocal pneumonia  CT chest shows multifocal pneumonia with dense consolidation in both lower lobes  Was started on Rocephin and Zithromax  Urine strep and Legionella was sent  Check MRSA  Is on Mucinex and breathing treatments  Solu-Medrol 40 mg IV every 8  Keep oxygenation greater than 88%  Fluctuate between BiPAP and nasal cannula  Current moderate episode of major depressive disorder without prior episode (HCC)  Continue with Depakote, Zyprexa and Cymbalta.   Bronchitis  No formal diagnosis of COPD the patient has smoked on and off for 30 years  Daughter states she is a closet smoker  Continue IV Rocephin and Zithromax  IV Solu-Medrol 40 mg IV every 8 hours  Respiratory protocol  Xopenex/atrovent and Pulmicort  Acute hypoxic respiratory failure (HCC)  Patient went into acute hypoxic respiratory failure was a rapid response  Appears to be volume overloaded with Rales throughout  40 mg IV in the rapid  Continue to monitor  Troponin BNP was sent  Follow-up of labs  Echocardiogram -Takotsubo cardiomyopathy    Sepsis with acute hypoxic respiratory failure (HCC)  See treatment under multifocal pneumonia  Flash pulmonary edema (HCC)  Went into acute respiratory failure  Increased vascular congestion chest x-ray  Fluffy infiltrate  Lasix 40 mg IV  Cardiogram is pending  Tachycardia induced flash pulmonary edema possible , CHF   Anxiety  Patient received all the medications but complained of anxiety with BiPAP  Started on Precedex 0.2 for hours of sleep  Can be DC in the am   Elevated troponin  Most likely demand ischemia  To flash pulmonary edema  Troponin high was 1151  Echocardiogram is pending  Cardiology  consult    Takotsubo cardiomyopathy  A/e/b echo 2/13 EF 15%, G1DD, akinetic apical myocardium   In  the setting of anxiety, depression, home stressors   Fluid overloaded on 2/12 requiring BiPAP and Lasix  Cardiology on consult   Start Carvedilol 3.125mg daily and Jardiance daily   Plan to start lisinopril 2/14 as pressures tolerate   Continue with gentle diuresis with lasix to target euvolemic and net balance even to slightly negative   Repeat echo 2/17  Continuous cardiopulmonary monitoring  Hypotension  Retention most likely related due to Takotsubo's and multifocal pneumonia  Started on Levophed 2 - down titrated to 1 ( albumin given no response)   Wean as tolerable  Disposition: Critical care    ICU Core Measures     A: Assess, Prevent, and Manage Pain Has pain been assessed? NA  Need for changes to pain regimen? NA   B: Both SAT/SAT  N/A   C: Choice of Sedation RASS Goal: 0 Alert and Calm  Need for changes to sedation or analgesia regimen? No   D: Delirium CAM-ICU: Negative   E: Early Mobility  Plan for early mobility? Yes   F: Family Engagement Plan for family engagement today? Yes       Antibiotic Review: Awaiting culture results.     Review of Invasive Devices:    [place] Plan: Continue for accurate I/O monitoring for 48 hours        Prophylaxis:  VTE VTE covered by:  enoxaparin, Subcutaneous, 40 mg at 02/13/25 1029       Stress Ulcer  not orderedcovered byomeprazole (PriLOSEC) 20 mg delayed release capsule [[id number]] (Long-Term Med)         24 Hour Events :     Blood pressure dropped into the 70 attempted 25% albumin with no response in blood pressure started Levophed at 2 mics/min blood pressure improved significantly was maintained on Levophed at 1 for blood pressure systolically greater than 90 and MAP greater than 65  Patient still gets increased work of breathing when on nasal cannula only most likely related to Takotsubo's and PNA      Subjective   Review of Systems: Review of Systems   Constitutional:  Positive for fatigue. Negative for appetite change and chills.   Respiratory:  Positive for  shortness of breath and wheezing.    Gastrointestinal:  Negative for abdominal distention, abdominal pain, constipation and diarrhea.   All other systems reviewed and are negative.      Objective :                   Vitals I/O      Most Recent Min/Max in 24hrs   Temp 97.8 °F (36.6 °C) Temp  Min: 97.5 °F (36.4 °C)  Max: 100.7 °F (38.2 °C)   Pulse 71 Pulse  Min: 69  Max: 126   Resp 16 Resp  Min: 16  Max: 31   BP 93/52 BP  Min: 74/47  Max: 145/78   O2 Sat 96 % SpO2  Min: 90 %  Max: 99 %      Intake/Output Summary (Last 24 hours) at 2/14/2025 0458  Last data filed at 2/14/2025 0401  Gross per 24 hour   Intake 597.68 ml   Output 2175 ml   Net -1577.32 ml       Diet Regular; Regular House    Invasive Monitoring           Physical Exam   Physical Exam  Vitals and nursing note reviewed.   Eyes:      Extraocular Movements: Extraocular movements intact.      Pupils: Pupils are equal, round, and reactive to light.   Skin:     General: Skin is warm, dry and not mottled extremities.      Capillary Refill: Capillary refill takes 2 to 3 seconds.      Coloration: Skin is pale.   HENT:      Head: Normocephalic and atraumatic.   Cardiovascular:      Rate and Rhythm: Normal rate and regular rhythm.      Pulses: Decreased pulses.      Heart sounds: Normal heart sounds.   Musculoskeletal:         General: Normal range of motion.      Cervical back: Full passive range of motion without pain, normal range of motion and neck supple.   Abdominal: General: Bowel sounds are normal.      Palpations: Abdomen is soft.   Constitutional:       General: She is awake.      Appearance: She is well-developed, normal weight and well-nourished. She is ill-appearing.      Interventions: Nasal cannula and face mask in place.   Pulmonary:      Effort: Pulmonary effort is normal.      Breath sounds: Examination of the right-middle field reveals rhonchi. Examination of the left-middle field reveals rhonchi. Examination of the right-lower field reveals  decreased breath sounds and rhonchi. Examination of the left-lower field reveals decreased breath sounds and rhonchi. Decreased breath sounds and rhonchi present. No wheezing.   Psychiatric:         Behavior: Behavior is cooperative.   Neurological:      General: No focal deficit present.      Mental Status: She is alert, easily aroused and oriented to person, place and time. Mental status is at baseline.      Sensory: Sensation is intact.      Motor: gross motor function is at baseline for patient. Strength full and intact in all extremities.          Diagnostic Studies        Lab Results: I have reviewed the following results:     Medications:  Scheduled PRN   azithromycin, 500 mg, Q24H  budesonide, 0.5 mg, Q12H  carvedilol, 3.125 mg, BID With Meals  cefTRIAXone, 1,000 mg, Q24H  divalproex sodium, 250 mg, BID  DULoxetine, 30 mg, Daily  Empagliflozin, 10 mg, Daily  enoxaparin, 40 mg, Daily  furosemide, 40 mg, BID (diuretic)  guaiFENesin, 600 mg, BID  influenza vaccine, 0.5 mL, Once  ipratropium, 0.5 mg, TID  levalbuterol, 1.25 mg, TID  methylPREDNISolone sodium succinate, 40 mg, Q8H BHARATH  OLANZapine, 7.5 mg, HS      acetaminophen, 650 mg, Q6H PRN  LORazepam, 0.5 mg, Q6H PRN  perflutren lipid microsphere, 0.4 mL/min, Once in imaging       Continuous    dexmedetomidine, 0.1-0.7 mcg/kg/hr, Last Rate: Stopped (02/13/25 2030)  norepinephrine, 1-30 mcg/min, Last Rate: 1 mcg/min (02/14/25 0045)         Labs:   CBC    Recent Labs     02/12/25 1931 02/13/25 0449   WBC 29.16* 24.18*   HGB 10.8* 9.3*   HCT 32.9* 28.7*    297   BANDSPCT  --  13*     BMP    Recent Labs     02/12/25 1931 02/13/25 0449   SODIUM 140 142   K 4.3 3.8    108   CO2 27 28   AGAP 7 6   BUN 18 20   CREATININE 0.80 0.68   CALCIUM 8.6 8.4       Coags    No recent results     Additional Electrolytes  Recent Labs     02/12/25 1931 02/12/25 2017 02/13/25 0449   MG 2.0  --  1.9   PHOS 4.6*  --  3.4   CAIONIZED  --  1.14 1.17          Blood  Gas    Recent Labs     02/13/25  0953   PHART 7.430   JCP6OGE 44.0   PO2ART 66.8*   DEE2GKA 28.5*   BEART 3.7   SOURCE Radial, Right     Recent Labs     02/12/25 1931 02/13/25  0953   PHVEN 7.261*  --    RHO4KCP 52.8*  --    PO2VEN 21.6*  --    IYC0ZJC 23.2*  --    BEVEN -4.1  --    Q1XULOG 30.7*  --    SOURCE  --  Radial, Right    LFTs  Recent Labs     02/12/25 1931   ALT 25   AST 44*   ALKPHOS 86   ALB 3.1*   TBILI 0.31       Infectious  Recent Labs     02/12/25  0551   PROCALCITONI 0.52*     Glucose  Recent Labs     02/12/25  0551 02/12/25 1931 02/13/25  0449   GLUC 128 198* 119        Administrative Statements

## 2025-02-14 NOTE — ASSESSMENT & PLAN NOTE
Patient is on appropriate antibiotics.  Chest prior history and exam, she certainly seems to have baseline COPD as well.

## 2025-02-14 NOTE — ASSESSMENT & PLAN NOTE
Patient went into acute hypoxic respiratory failure was a rapid response  Appears to be volume overloaded with Rales throughout  40 mg IV in the rapid  Continue to monitor  Troponin BNP was sent  Follow-up of labs  Echocardiogram -Takotsubo cardiomyopathy

## 2025-02-14 NOTE — PROGRESS NOTES
Noted by RN team   Progress Note - Cardiology   Name: Bea Perez 74 y.o. female I MRN: [id number]  Unit/Bed#: -01 I Date of Admission: 2/11/2025   Date of Service: 2/14/2025 I Hospital Day: 3     Assessment & Plan  Takotsubo cardiomyopathy  Echocardiogram from yesterday showed dilated and ballooned out LV cavity with EF of 15%, suggestive of Takotsubo cardiomyopathy.  No prior study for comparison.  Cannot exclude underlying CAD and prior infarct.  Her ECGs from July 2024 had shown septal infarct pattern.  Continue lowest possible dose of carvedilol in view of her borderline blood pressure.  Aim would be to initiate 2.5 of lisinopril in the next day or two if blood pressure allows.  I would also recommend starting her on Jardiance for now.  Recommend changing IV Lasix to p.o. 40 once a day.  Recheck limited echo for LV function on Monday morning.  If her LV function continues to improve over the next 3 to 5 days, then she will not need a LifeVest at discharge.  If EF remains below 35%, then she should go home on LifeVest.  Elevated troponin  Her troponin elevation could be explained by possible Takotsubo cardiomyopathy.  Not suggestive of acute coronary syndrome.  Multifocal pneumonia  Patient is on appropriate antibiotics.  Chest prior history and exam, she certainly seems to have baseline COPD as well.  Flash pulmonary edema (HCC)  It is unclear whether patient already had existing LV systolic dysfunction and went to acute pulmonary edema because of aggressive IV hydration or whether she developed acute Takotsubo cardiomyopathy leading to flash pulmonary edema.  Clinically she is fairly euvolemic.  Recommend decreasing Lasix to 40 once a day oral, particular in view of her hypotension.  Sepsis with acute hypoxic respiratory failure (HCC)  Patient is on appropriate antibiotics.  Just prior history and exam, she appears to have COPD as well.  Hypotension  Patient required Levophed overnight for hypotension.    Subjective    Chief Complaint: Dyspnea and productive cough.  Patient still continues to have significant productive cough but reports that her dyspnea is better.  She denies any chest pain or syncope.    Objective :  Temp:  [97.5 °F (36.4 °C)-100.7 °F (38.2 °C)] 97.9 °F (36.6 °C)  HR:  [] 99  BP: ()/(46-78) 96/59  Resp:  [15-31] 25  SpO2:  [90 %-99 %] 92 %  O2 Device: Nasal cannula  Nasal Cannula O2 Flow Rate (L/min):  [3 L/min-5 L/min] 4 L/min  FiO2 (%):  [50] 50  Orthostatic Blood Pressures      Flowsheet Row Most Recent Value   Blood Pressure 96/59 filed at 02/14/2025 0800   Patient Position - Orthostatic VS Lying filed at 02/14/2025 0730          First Weight: Weight - Scale: 59.4 kg (130 lb 15.3 oz) (02/11/25 1758)  Vitals:    02/13/25 0749 02/14/25 0600   Weight: 58.5 kg (129 lb) 60.9 kg (134 lb 4.2 oz)     Physical Exam  General appearance: Alert, mild respiratory distress, appears older than stated age, normal weight.  HEENT: Mucous membranes are moist.  No obvious abnormality noted.  Neck: Supple with no lymphadenopathy.  No JVD.  Carotid pulses are intact.  No carotid bruit.  Cardiovascular system: Regular rhythm.  Distant heart sounds.  No murmurs.  No rubs or gallops. Extremities: No edema. No cyanosis.  Pulmonary: Respirations mildly labored.  Significantly reduced air entry bilaterally.  Fairly silent chest bilaterally.  gastrointestinal: Abdomen is soft and nontender.  Bowel sounds are positive.  Musculoskeletal: Upper Extremities: Normal upper motor strength. Lower Extremity: Normal motor strength.   Skin: Skin is warm. No rashes or lesions.  Neurological: Patient is alert and oriented with no gross motor deficits.  Psychiatric: Mood is anxious.  Behavior is normal    Lab Results: I have reviewed the following results:  Results from last 7 days   Lab Units 02/14/25  0611 02/13/25  0449 02/12/25  1931   WBC Thousand/uL 26.84* 24.18* 29.16*   HEMOGLOBIN g/dL 9.9* 9.3* 10.8*   HEMATOCRIT % 30.1* 28.7*  "32.9*   PLATELETS Thousands/uL 346 297 320     Results from last 7 days   Lab Units 02/14/25  0611 02/13/25  0449 02/12/25  1931   POTASSIUM mmol/L 3.6 3.8 4.3   CHLORIDE mmol/L 107 108 106   CO2 mmol/L 33* 28 27   BUN mg/dL 35* 20 18   CREATININE mg/dL 0.80 0.68 0.80   CALCIUM mg/dL 8.5 8.4 8.6         No results found for: \"HGBA1C\"  No results found for: \"CKTOTAL\", \"CKMB\", \"CKMBINDEX\", \"TROPONINI\"    "

## 2025-02-14 NOTE — ASSESSMENT & PLAN NOTE
It is unclear whether patient already had existing LV systolic dysfunction and went to acute pulmonary edema because of aggressive IV hydration or whether she developed acute Takotsubo cardiomyopathy leading to flash pulmonary edema.  Clinically she is fairly euvolemic.  Recommend decreasing Lasix to 40 once a day oral, particular in view of her hypotension.

## 2025-02-14 NOTE — TELEPHONE ENCOUNTER
Patient diagnosed with cardiomyopathy and needs hospital follow up within 7-10 days of discharge.  Please arrange.  Thank you!!

## 2025-02-14 NOTE — PROGRESS NOTES
"Progress Note - Critical Care/ICU   Name: Bea Perez 74 y.o. female I MRN: [id number]  Unit/Bed#: -01 I Date of Admission: 2/11/2025   Date of Service: 2/13/2025 I Hospital Day: 2       Interval Events:     Patient is a 74-year-old female, today on medication for anxiety and is on her regular psychiatry medication.  Her echocardiogram today showed an EF of 15% with Takotsubo's cardiomyopathy.   Patient's blood pressure dropped gave albumin if no response to fluids and albumin started on low-dose Levophed.   Most likely related to PNA and low cardiac function            Pertinent New Data:   blood pressure, pulse, temperature, respirations, and pulse oximetry  CBC:   Lab Results   Component Value Date    WBC 24.18 (H) 02/13/2025    HGB 9.3 (L) 02/13/2025    HCT 28.7 (L) 02/13/2025    MCV 91 02/13/2025     02/13/2025    RBC 3.15 (L) 02/13/2025    MCH 29.5 02/13/2025    MCHC 32.4 02/13/2025    RDW 16.5 (H) 02/13/2025    MPV 10.5 02/13/2025   , CMP:   Lab Results   Component Value Date    SODIUM 142 02/13/2025    K 3.8 02/13/2025     02/13/2025    CO2 28 02/13/2025    BUN 20 02/13/2025    CREATININE 0.68 02/13/2025    CALCIUM 8.4 02/13/2025    EGFR 86 02/13/2025   , Troponin: No results found for: \"TROPONINI\", Magnesium: No components found for: \"MAG\", Phosphorous:   Lab Results   Component Value Date    PHOS 3.4 02/13/2025     EchocardiogramResults Review Statement: I personally reviewed the following image studies in PACS and associated radiology reports: Echocardiogram. My interpretation of the radiology images/reports is: Takotsubo's cardiomyopathy.    Assessment and Plan  Diagnosis: Takotsubo's cardiomyopathy with sepsis due to pneumonia  Plan: Start Levophed low-dose for blood pressure support    Billing Level:  Critical Care Time Statement: Upon my evaluation, this patient had a high probability of imminent or life-threatening deterioration due to Subu's cardiomyopathy, sepsis, PNA , which " required my direct attention, intervention, and personal management.  I spent a total of 20 minutes directly providing critical care services, including interpretation of complex medical databases, evaluating for the presence of life-threatening injuries or illnesses, complex medical decision making (to support/prevent further life-threatening deterioration)., interpretation of hemodynamic data, titration of vasoactive medications, and titration of continuous IV medications (drips). This time is exclusive of procedures, teaching, family meetings, and any prior time recorded by providers other than myself.      Neo Campbell PA-C

## 2025-02-14 NOTE — ASSESSMENT & PLAN NOTE
Echocardiogram from yesterday showed dilated and ballooned out LV cavity with EF of 15%, suggestive of Takotsubo cardiomyopathy.  No prior study for comparison.  Cannot exclude underlying CAD and prior infarct.  Her ECGs from July 2024 had shown septal infarct pattern.  Continue lowest possible dose of carvedilol in view of her borderline blood pressure.  Aim would be to initiate 2.5 of lisinopril in the next day or two if blood pressure allows.  I would also recommend starting her on Jardiance for now.  Recommend changing IV Lasix to p.o. 40 once a day.  Recheck limited echo for LV function on Monday morning.  If her LV function continues to improve over the next 3 to 5 days, then she will not need a LifeVest at discharge.  If EF remains below 35%, then she should go home on LifeVest.

## 2025-02-15 LAB
ANION GAP SERPL CALCULATED.3IONS-SCNC: 4 MMOL/L (ref 4–13)
BUN SERPL-MCNC: 34 MG/DL (ref 5–25)
CA-I BLD-SCNC: 1.16 MMOL/L (ref 1.12–1.32)
CALCIUM SERPL-MCNC: 8.5 MG/DL (ref 8.4–10.2)
CHLORIDE SERPL-SCNC: 107 MMOL/L (ref 96–108)
CO2 SERPL-SCNC: 32 MMOL/L (ref 21–32)
CREAT SERPL-MCNC: 0.64 MG/DL (ref 0.6–1.3)
ERYTHROCYTE [DISTWIDTH] IN BLOOD BY AUTOMATED COUNT: 16.2 % (ref 11.6–15.1)
GFR SERPL CREATININE-BSD FRML MDRD: 88 ML/MIN/1.73SQ M
GLUCOSE SERPL-MCNC: 121 MG/DL (ref 65–140)
HCT VFR BLD AUTO: 31.3 % (ref 34.8–46.1)
HGB BLD-MCNC: 10.3 G/DL (ref 11.5–15.4)
MAGNESIUM SERPL-MCNC: 2.5 MG/DL (ref 1.9–2.7)
MCH RBC QN AUTO: 29.7 PG (ref 26.8–34.3)
MCHC RBC AUTO-ENTMCNC: 32.9 G/DL (ref 31.4–37.4)
MCV RBC AUTO: 90 FL (ref 82–98)
PHOSPHATE SERPL-MCNC: 3.4 MG/DL (ref 2.3–4.1)
PLATELET # BLD AUTO: 385 THOUSANDS/UL (ref 149–390)
PMV BLD AUTO: 9.8 FL (ref 8.9–12.7)
POTASSIUM SERPL-SCNC: 4.2 MMOL/L (ref 3.5–5.3)
RBC # BLD AUTO: 3.47 MILLION/UL (ref 3.81–5.12)
SODIUM SERPL-SCNC: 143 MMOL/L (ref 135–147)
WBC # BLD AUTO: 21.91 THOUSAND/UL (ref 4.31–10.16)

## 2025-02-15 PROCEDURE — 82330 ASSAY OF CALCIUM: CPT

## 2025-02-15 PROCEDURE — 94640 AIRWAY INHALATION TREATMENT: CPT

## 2025-02-15 PROCEDURE — 94760 N-INVAS EAR/PLS OXIMETRY 1: CPT

## 2025-02-15 PROCEDURE — 83735 ASSAY OF MAGNESIUM: CPT

## 2025-02-15 PROCEDURE — NC001 PR NO CHARGE

## 2025-02-15 PROCEDURE — 90662 IIV NO PRSV INCREASED AG IM: CPT | Performed by: PHYSICIAN ASSISTANT

## 2025-02-15 PROCEDURE — 80048 BASIC METABOLIC PNL TOTAL CA: CPT

## 2025-02-15 PROCEDURE — G0008 ADMIN INFLUENZA VIRUS VAC: HCPCS | Performed by: PHYSICIAN ASSISTANT

## 2025-02-15 PROCEDURE — 85027 COMPLETE CBC AUTOMATED: CPT

## 2025-02-15 PROCEDURE — 99232 SBSQ HOSP IP/OBS MODERATE 35: CPT | Performed by: ANESTHESIOLOGY

## 2025-02-15 PROCEDURE — 84100 ASSAY OF PHOSPHORUS: CPT

## 2025-02-15 RX ORDER — METHYLPREDNISOLONE SODIUM SUCCINATE 40 MG/ML
40 INJECTION, POWDER, LYOPHILIZED, FOR SOLUTION INTRAMUSCULAR; INTRAVENOUS EVERY 12 HOURS SCHEDULED
Status: COMPLETED | OUTPATIENT
Start: 2025-02-15 | End: 2025-02-16

## 2025-02-15 RX ORDER — LISINOPRIL 2.5 MG/1
2.5 TABLET ORAL DAILY
Status: DISCONTINUED | OUTPATIENT
Start: 2025-02-15 | End: 2025-02-20 | Stop reason: HOSPADM

## 2025-02-15 RX ADMIN — LORAZEPAM 0.5 MG: 2 INJECTION INTRAMUSCULAR; INTRAVENOUS at 21:39

## 2025-02-15 RX ADMIN — EMPAGLIFLOZIN 10 MG: 10 TABLET, FILM COATED ORAL at 08:09

## 2025-02-15 RX ADMIN — METHYLPREDNISOLONE SODIUM SUCCINATE 40 MG: 40 INJECTION, POWDER, FOR SOLUTION INTRAMUSCULAR; INTRAVENOUS at 21:39

## 2025-02-15 RX ADMIN — BUDESONIDE INHALATION 0.5 MG: 0.5 SUSPENSION RESPIRATORY (INHALATION) at 08:34

## 2025-02-15 RX ADMIN — ENOXAPARIN SODIUM 40 MG: 40 INJECTION SUBCUTANEOUS at 08:09

## 2025-02-15 RX ADMIN — GUAIFENESIN 600 MG: 600 TABLET ORAL at 08:09

## 2025-02-15 RX ADMIN — CEFTRIAXONE 1000 MG: 1 INJECTION, SOLUTION INTRAVENOUS at 19:54

## 2025-02-15 RX ADMIN — METHYLPREDNISOLONE SODIUM SUCCINATE 40 MG: 40 INJECTION, POWDER, FOR SOLUTION INTRAMUSCULAR; INTRAVENOUS at 06:06

## 2025-02-15 RX ADMIN — CARVEDILOL 3.12 MG: 3.12 TABLET, FILM COATED ORAL at 17:27

## 2025-02-15 RX ADMIN — CARVEDILOL 3.12 MG: 3.12 TABLET, FILM COATED ORAL at 08:08

## 2025-02-15 RX ADMIN — INFLUENZA A VIRUS A/VICTORIA/4897/2022 IVR-238 (H1N1) ANTIGEN (FORMALDEHYDE INACTIVATED), INFLUENZA A VIRUS A/CALIFORNIA/122/2022 SAN-022 (H3N2) ANTIGEN (FORMALDEHYDE INACTIVATED), AND INFLUENZA B VIRUS B/MICHIGAN/01/2021 ANTIGEN (FORMALDEHYDE INACTIVATED) 0.5 ML: 60; 60; 60 INJECTION, SUSPENSION INTRAMUSCULAR at 14:19

## 2025-02-15 RX ADMIN — LEVALBUTEROL HYDROCHLORIDE 1.25 MG: 1.25 SOLUTION RESPIRATORY (INHALATION) at 20:11

## 2025-02-15 RX ADMIN — DIVALPROEX SODIUM 250 MG: 125 CAPSULE, COATED PELLETS ORAL at 17:26

## 2025-02-15 RX ADMIN — IPRATROPIUM BROMIDE 0.5 MG: 0.5 SOLUTION RESPIRATORY (INHALATION) at 20:11

## 2025-02-15 RX ADMIN — LEVALBUTEROL HYDROCHLORIDE 1.25 MG: 1.25 SOLUTION RESPIRATORY (INHALATION) at 08:34

## 2025-02-15 RX ADMIN — IPRATROPIUM BROMIDE 0.5 MG: 0.5 SOLUTION RESPIRATORY (INHALATION) at 14:08

## 2025-02-15 RX ADMIN — DIVALPROEX SODIUM 250 MG: 125 CAPSULE, COATED PELLETS ORAL at 08:08

## 2025-02-15 RX ADMIN — LEVALBUTEROL HYDROCHLORIDE 1.25 MG: 1.25 SOLUTION RESPIRATORY (INHALATION) at 14:08

## 2025-02-15 RX ADMIN — ACETAMINOPHEN 325MG 650 MG: 325 TABLET ORAL at 21:50

## 2025-02-15 RX ADMIN — LISINOPRIL 2.5 MG: 2.5 TABLET ORAL at 08:09

## 2025-02-15 RX ADMIN — OLANZAPINE 7.5 MG: 2.5 TABLET, FILM COATED ORAL at 21:39

## 2025-02-15 RX ADMIN — IPRATROPIUM BROMIDE 0.5 MG: 0.5 SOLUTION RESPIRATORY (INHALATION) at 08:34

## 2025-02-15 RX ADMIN — GUAIFENESIN 600 MG: 600 TABLET ORAL at 17:26

## 2025-02-15 RX ADMIN — DULOXETINE HYDROCHLORIDE 30 MG: 30 CAPSULE, DELAYED RELEASE ORAL at 08:09

## 2025-02-15 RX ADMIN — BUDESONIDE INHALATION 0.5 MG: 0.5 SUSPENSION RESPIRATORY (INHALATION) at 20:11

## 2025-02-15 RX ADMIN — FUROSEMIDE 40 MG: 40 TABLET ORAL at 08:09

## 2025-02-15 NOTE — ASSESSMENT & PLAN NOTE
Patient went into acute hypoxic respiratory failure was a rapid response  Appears to be volume overloaded with Rales throughout  40 mg IV x 2 yesterday, on lasix 40 mg PO daily   Continue to monitor  Echocardiogram -Takotsubo cardiomyopathy

## 2025-02-15 NOTE — ASSESSMENT & PLAN NOTE
No formal diagnosis of COPD the patient has smoked on and off for 30 years  Daughter states she is a closet smoker  Continue IV Rocephin and Zithromax DC Zithromax Legionella is negative  IV Solu-Medrol 40 mg IV every 8 hours  Respiratory protocol  Xopenex/atrovent and Pulmicort

## 2025-02-15 NOTE — ASSESSMENT & PLAN NOTE
A/e/b echo 2/13 EF 15%, G1DD, akinetic apical myocardium   In the setting of anxiety, depression, home stressors   Fluid overloaded on 2/12 requiring BiPAP and Lasix  Cardiology on consult   Start Carvedilol 3.125mg daily and Jardiance daily   Plan to start lisinopril 2/14 as pressures tolerate   Continue with gentle diuresis with lasix to target euvolemic and net balance even to slightly negative   Repeat echo 2/17  Continuous cardiopulmonary monitoring

## 2025-02-15 NOTE — RESPIRATORY THERAPY NOTE
RT Protocol Note  Bea Perez 74 y.o. female MRN: [id number]  Unit/Bed#: -01 Encounter: [id number]    Assessment    Principal Problem:    Multifocal pneumonia  Active Problems:    Current moderate episode of major depressive disorder without prior episode (HCC)    Bronchitis    Acute hypoxic respiratory failure (HCC)    Sepsis with acute hypoxic respiratory failure (HCC)    Flash pulmonary edema (HCC)    Anxiety    Elevated troponin    Takotsubo cardiomyopathy      Home Pulmonary Medications:         Past Medical History:   Diagnosis Date    Depression     Esophageal stricture     History of gastroesophageal reflux (GERD)     Iron deficiency anemia     Meniere disease      Social History     Socioeconomic History    Marital status: /Civil Union     Spouse name: None    Number of children: None    Years of education: None    Highest education level: None   Occupational History    None   Tobacco Use    Smoking status: Some Days     Types: Cigarettes    Smokeless tobacco: Never   Vaping Use    Vaping status: Never Used   Substance and Sexual Activity    Alcohol use: Not Currently     Comment: Denies alcohol use - As per [employer]     Drug use: Not Currently    Sexual activity: None   Other Topics Concern    None   Social History Narrative    None     Social Drivers of Health     Financial Resource Strain: Low Risk  (12/17/2024)    Received from [facility]    Overall Financial Resource Strain (CARDIA)     Difficulty of Paying Living Expenses: Not hard at all   Food Insecurity: No Food Insecurity (2/11/2025)    Nursing - Inadequate Food Risk Classification     Worried About Running Out of Food in the Last Year: Not on file     Ran Out of Food in the Last Year: Not on file     Ran Out of Food in the Last Year: Never true   Transportation Needs: No Transportation Needs (2/11/2025)    Nursing - Transportation Risk Classification     Lack of Transportation: Not on file     Lack of  "Transportation: No   Physical Activity: Not on file   Stress: No Stress Concern Present (2024)    Received from [facility]    [employer] [place] [employer] - Occupational Stress Questionnaire     Feeling of Stress : Only a little   Social Connections: Unknown (2024)    Received from [employer]    Social Connections     How often do you feel lonely or isolated from those around you? (Adult - for ages 18 years and over): Not on file   Intimate Partner Violence: Unknown (2025)    Nursing IPS     Feels Physically and Emotionally Safe: Not on file     Physically Hurt by Someone: Not on file     Humiliated or Emotionally Abused by Someone: Not on file     Physically Hurt by Someone: No     Hurt or Threatened by Someone: No   Housing Stability: Unknown (2025)    Nursing: Inadequate Housing Risk Classification     Has Housing: Not on file     Worried About Losing Housing: Not on file     Unable to Get Utilities: Not on file     Unable to Pay for Housing in the Last Year: No     Has Housin       Subjective         Objective    Physical Exam:   Assessment Type: Assess only  General Appearance: Awake, Alert  Bilateral Breath Sounds: Diminished  O2 Device: 4lpm    Vitals:  Blood pressure 121/66, pulse 93, temperature 98 °F (36.7 °C), temperature source Temporal, resp. rate (!) 32, height 5' 2\" (1.575 m), weight 60.9 kg (134 lb 4.2 oz), SpO2 93%.    Results from last 7 days   Lab Units 25  0953   PH ART  7.430   PCO2 ART mm Hg 44.0   PO2 ART mm Hg 66.8*   HCO3 ART mmol/L 28.5*   BASE EXC ART mmol/L 3.7   O2 CONTENT ART mL/dL 17.4   O2 HGB, ARTERIAL % 92.9*   ABG SOURCE  Radial, Right   SMOOTH TEST  Yes       Imaging and other studies:     O2 Device: 4lpm     Plan    Respiratory Plan: Mild Distress pathway        Resp Comments: Pt continiues 4lpm NC, respiratory pattern unlabored, scheduled neb tx provided, Sp02 middle 90s.   "

## 2025-02-15 NOTE — PROGRESS NOTES
Progress Note - Critical Care/ICU   Name: Bea Perez 74 y.o. female I MRN: [id number]  Unit/Bed#: -01 I Date of Admission: 2/11/2025   Date of Service: 2/15/2025 I Hospital Day: 4      Assessment & Plan  Multifocal pneumonia  CT chest shows multifocal pneumonia with dense consolidation in both lower lobes   Rocephin and Zithromax daily   Urine strep and Legionella negative  Check negative  Is on Mucinex and breathing treatments  Solu-Medrol 40 mg IV every 8  Keep oxygenation greater than 88%  Fluctuate between BiPAP and nasal cannula  Current moderate episode of major depressive disorder without prior episode (HCC)  Continue with Depakote, Zyprexa and Cymbalta.   Bronchitis  No formal diagnosis of COPD the patient has smoked on and off for 30 years  Daughter states she is a closet smoker  Continue IV Rocephin and Zithromax DC Zithromax Legionella is negative  IV Solu-Medrol 40 mg IV every 8 hours  Respiratory protocol  Xopenex/atrovent and Pulmicort  Acute hypoxic respiratory failure (HCC)  Patient went into acute hypoxic respiratory failure was a rapid response  Appears to be volume overloaded with Rales throughout  40 mg IV x 2 yesterday, on lasix 40 mg PO daily   Continue to monitor  Echocardiogram -Takotsubo cardiomyopathy    Sepsis with acute hypoxic respiratory failure (HCC)  See treatment under multifocal pneumonia  Flash pulmonary edema (HCC)  Went into acute respiratory failure  Increased vascular congestion chest x-ray  Fluffy infiltrate  Lasix 40 mg IV x2, now on lasix PO daily and coreg 3.125 PO BID and audience 10 mg p.o. daily  Cardiogram is pending  Tachycardia induced flash pulmonary edema possible , CHF   Anxiety  Patient has Ativan 0.5 mg V as needed anxiety  Consider changing to p.o.  Elevated troponin  Most likely demand ischemia  To flash pulmonary edema  Troponin high was 1151  Echocardiogram -Takotsubo's myopathy  Cardiology is following    Takotsubo cardiomyopathy  A/e/b echo 2/13  EF 15%, G1DD, akinetic apical myocardium   In the setting of anxiety, depression, home stressors   Fluid overloaded on 2/12 requiring BiPAP and Lasix  Cardiology on consult   Start Carvedilol 3.125mg daily and Jardiance daily   Plan to start lisinopril 2/14 as pressures tolerate   Continue with gentle diuresis with lasix to target euvolemic and net balance even to slightly negative   Repeat echo 2/17  Continuous cardiopulmonary monitoring  Hypotension (Resolved: 2/14/2025)  Retention most likely related due to Takotsubo's and multifocal pneumonia  Started on Levophed 2 - down titrated to 1 ( albumin given no response)   Wean as tolerable  Has resolved   Disposition: Stepdown Level 1    ICU Core Measures     A: Assess, Prevent, and Manage Pain Has pain been assessed? NA  Need for changes to pain regimen? NA   B: Both SAT/SAT  N/A   C: Choice of Sedation RASS Goal: 0 Alert and Calm or N/A patient not on sedation  Need for changes to sedation or analgesia regimen? NA   D: Delirium CAM-ICU: Negative   E: Early Mobility  Plan for early mobility? Yes   F: Family Engagement Plan for family engagement today? Yes       Antibiotic Review: Awaiting culture results.     Review of Invasive Devices:    [place]ard Plan: Howard to be removed. Order has been placed        Prophylaxis:  VTE VTE covered by:  enoxaparin, Subcutaneous, 40 mg at 02/14/25 0816       Stress Ulcer  not orderedcovered byomeprazole (PriLOSEC) 20 mg delayed release capsule [[id number]] (Long-Term Med)         24 Hour Events :     - dc azithromycin        Subjective   Review of Systems: Review of Systems   Constitutional:  Positive for fatigue.   Respiratory:  Positive for cough, shortness of breath and wheezing.    All other systems reviewed and are negative.      Objective :           Patient feels a lot better            Vitals I/O      Most Recent Min/Max in 24hrs   Temp 98 °F (36.7 °C) Temp  Min: 97.8 °F (36.6 °C)  Max: 98 °F (36.7 °C)   Pulse 86 Pulse  Min: 67   Max: 99   Resp (!) 23 Resp  Min: 15  Max: 32   /58 BP  Min: 85/50  Max: 121/66   O2 Sat 93 % SpO2  Min: 88 %  Max: 97 %      Intake/Output Summary (Last 24 hours) at 2/15/2025 0107  Last data filed at 2/14/2025 2023  Gross per 24 hour   Intake 368.94 ml   Output 1120 ml   Net -751.06 ml       Diet Regular; Regular House    Invasive Monitoring           Physical Exam   Physical Exam  Vitals and nursing note reviewed.   Eyes:      Extraocular Movements: Extraocular movements intact.      Pupils: Pupils are equal, round, and reactive to light.   Skin:     General: Skin is warm, dry and not mottled extremities.      Capillary Refill: Capillary refill takes less than 2 seconds.      Coloration: Skin is not pale.   HENT:      Head: Normocephalic and atraumatic.   Cardiovascular:      Rate and Rhythm: Normal rate and regular rhythm.      Pulses: Normal pulses.      Heart sounds: Normal heart sounds.   Musculoskeletal:         General: Normal range of motion.      Cervical back: Full passive range of motion without pain, normal range of motion and neck supple.   Abdominal: General: Bowel sounds are normal.      Palpations: Abdomen is soft.   Constitutional:       General: She is awake.      Appearance: She is well-developed and well-nourished.   Pulmonary:      Effort: Pulmonary effort is normal.      Breath sounds: Examination of the right-lower field reveals decreased breath sounds. Examination of the left-lower field reveals decreased breath sounds. Decreased breath sounds, wheezing and rhonchi present.   Psychiatric:         Behavior: Behavior is cooperative.   Neurological:      General: No focal deficit present.      Mental Status: She is alert, easily aroused and oriented to person, place and time. Mental status is at baseline.      Sensory: Sensation is intact.      Motor: gross motor function is at baseline for patient. Strength full and intact in all extremities.          Diagnostic Studies        Lab  Results: I have reviewed the following results:     Medications:  Scheduled PRN   budesonide, 0.5 mg, Q12H  carvedilol, 3.125 mg, BID With Meals  cefTRIAXone, 1,000 mg, Q24H  divalproex sodium, 250 mg, BID  DULoxetine, 30 mg, Daily  Empagliflozin, 10 mg, Daily  enoxaparin, 40 mg, Daily  furosemide, 40 mg, Daily  guaiFENesin, 600 mg, BID  influenza vaccine, 0.5 mL, Once  ipratropium, 0.5 mg, TID  levalbuterol, 1.25 mg, TID  methylPREDNISolone sodium succinate, 40 mg, Q8H BHARATH  OLANZapine, 7.5 mg, HS      acetaminophen, 650 mg, Q6H PRN  LORazepam, 0.5 mg, Q6H PRN  perflutren lipid microsphere, 0.4 mL/min, Once in imaging       Continuous          Labs:   CBC    Recent Labs     02/13/25 0449 02/14/25  0611   WBC 24.18* 26.84*   HGB 9.3* 9.9*   HCT 28.7* 30.1*    346   BANDSPCT 13*  --      BMP    Recent Labs     02/13/25 0449 02/14/25  0611   SODIUM 142 144   K 3.8 3.6    107   CO2 28 33*   AGAP 6 4   BUN 20 35*   CREATININE 0.68 0.80   CALCIUM 8.4 8.5       Coags    No recent results     Additional Electrolytes  Recent Labs     02/13/25 0449 02/14/25  0611   MG 1.9 2.7   PHOS 3.4 3.7   CAIONIZED 1.17 1.14          Blood Gas    Recent Labs     02/13/25  0953   PHART 7.430   JCJ3UUP 44.0   PO2ART 66.8*   ZGH8CMU 28.5*   BEART 3.7   SOURCE Radial, Right     Recent Labs     02/13/25  0953   SOURCE Radial, Right    LFTs  No recent results    Infectious  No recent results  Glucose  Recent Labs     02/13/25 0449 02/14/25  0611   GLUC 119 115        Administrative Statements

## 2025-02-15 NOTE — PROGRESS NOTES
Progress Note - Critical Care/ICU   Name: Bea Perez 74 y.o. female I MRN: [id number]  Unit/Bed#: -01 I Date of Admission: 2/11/2025   Date of Service: 2/15/2025 I Hospital Day: 4      Critical Care Interval Transfer Note:    Brief Hospital Summary: Hospital Course: 75 y/o  F hx anxiety, depression who presented with SOB and was admitted for sepsis due to multifocal PNA. Initially, on SLIM she was given fluid resuscitation and abx coverage with ceftriaxone and azithromycin. On 2/12 PM RRT called for respiratory distress 2/2 flash pulmonary edema. Given IV lasix and started BiPAP, transferred to ICU. Echo on 2/13 Am showed LVEF 15%, akinetic apical myocardium consistent with Takotsubo cardiomyopathy. Transiently required low dose levophed on 2/13-2/14. Cards on consult, started GDMT w/ Jardiance and carvedilol, added lisinopril today. Continuing diuresis with lasix 40 PO daily targeting +/- 500. She's on nasal cannula and plan is for her to have repeat echo on Monday to assess LV function, will likely require life vest at discharge. She'll complete 7 days rocephin on 2/18.      Barriers to discharge:   Monday f/u echo  GDMT optimization per cards  Finish rocephin x 7 days  Wean off o2     Consults: IP CONSULT TO CARDIOLOGY    Recommended to review admission imaging for incidental findings and document in discharge navigator: Chart reviewed, no known incidental findings noted at this time.      Discharge Plan: Anticipate discharge in 48-72 hrs to home.       Patient seen and evaluated by Critical Care today and deemed to be appropriate for transfer to Med Surg with Telemetry. Spoke to Dr. Tran from [facility] to accept transfer. Critical care can be contacted via SecureChat with any questions or concerns. Please use the Critical Care AP Role in Secure Chat for any provider inquires until the patient is transferred out of the ICU or until tomorrow at 0600.

## 2025-02-15 NOTE — ASSESSMENT & PLAN NOTE
Went into acute respiratory failure  Increased vascular congestion chest x-ray  Fluffy infiltrate  Lasix 40 mg IV x2, now on lasix PO daily and coreg 3.125 PO BID and audience 10 mg p.o. daily  Cardiogram is pending  Tachycardia induced flash pulmonary edema possible , CHF

## 2025-02-15 NOTE — ASSESSMENT & PLAN NOTE
Most likely demand ischemia  To flash pulmonary edema  Troponin high was 1151  Echocardiogram -Takotsubo's myopathy  Cardiology is following

## 2025-02-15 NOTE — ASSESSMENT & PLAN NOTE
CT chest shows multifocal pneumonia with dense consolidation in both lower lobes   Rocephin and Zithromax daily   Urine strep and Legionella negative  Check negative  Is on Mucinex and breathing treatments  Solu-Medrol 40 mg IV every 8  Keep oxygenation greater than 88%  Fluctuate between BiPAP and nasal cannula

## 2025-02-15 NOTE — ASSESSMENT & PLAN NOTE
Retention most likely related due to Takotsubo's and multifocal pneumonia  Started on Levophed 2 - down titrated to 1 ( albumin given no response)   Wean as tolerable  Has resolved

## 2025-02-15 NOTE — PLAN OF CARE
Problem: Potential for Falls  Goal: Patient will remain free of falls  Description: INTERVENTIONS:  - Educate patient/family on patient safety including physical limitations  - Instruct patient to call for assistance with activity   - Consult OT/PT to assist with strengthening/mobility   - Keep Call bell within reach  - Keep bed low and locked with side rails adjusted as appropriate  - Keep care items and personal belongings within reach  - Initiate and maintain comfort rounds  - Make Fall Risk Sign visible to staff  - Offer Toileting every 2 Hours, in advance of need  - Initiate/Maintain bed/chair alarm  - Obtain necessary fall risk management equipment: na  - Apply yellow socks and bracelet for high fall risk patients  - Consider moving patient to room near nurses station  Outcome: Progressing     Problem: PAIN - ADULT  Goal: Verbalizes/displays adequate comfort level or baseline comfort level  Description: Interventions:  - Encourage patient to monitor pain and request assistance  - Assess pain using appropriate pain scale  - Administer analgesics based on type and severity of pain and evaluate response  - Implement non-pharmacological measures as appropriate and evaluate response  - Consider cultural and social influences on pain and pain management  - Notify physician/advanced practitioner if interventions unsuccessful or patient reports new pain  Outcome: Progressing     Problem: INFECTION - ADULT  Goal: Absence or prevention of progression during hospitalization  Description: INTERVENTIONS:  - Assess and monitor for signs and symptoms of infection  - Monitor lab/diagnostic results  - Monitor all insertion sites, i.e. indwelling lines, tubes, and drains  - Monitor endotracheal if appropriate and nasal secretions for changes in amount and color  - [place] appropriate cooling/warming therapies per order  - Administer medications as ordered  - Instruct and encourage patient and family to use good hand hygiene  technique  - Identify and instruct in appropriate isolation precautions for identified infection/condition  Outcome: Progressing     Problem: SAFETY ADULT  Goal: Patient will remain free of falls  Description: INTERVENTIONS:  - Educate patient/family on patient safety including physical limitations  - Instruct patient to call for assistance with activity   - Consult OT/PT to assist with strengthening/mobility   - Keep Call bell within reach  - Keep bed low and locked with side rails adjusted as appropriate  - Keep care items and personal belongings within reach  - Initiate and maintain comfort rounds  - Make Fall Risk Sign visible to staff  - Offer Toileting every 2 Hours, in advance of need  - Initiate/Maintain bed/chair alarm  - Obtain necessary fall risk management equipment: na  - Apply yellow socks and bracelet for high fall risk patients  - Consider moving patient to room near nurses station  Outcome: Progressing     Problem: DISCHARGE PLANNING  Goal: Discharge to home or other facility with appropriate resources  Description: INTERVENTIONS:  - Identify barriers to discharge w/patient and caregiver  - Arrange for needed discharge resources and transportation as appropriate  - Identify discharge learning needs (meds, wound care, etc.)  - Arrange for interpretive services to assist at discharge as needed  - Refer to Case Management Department for coordinating discharge planning if the patient needs post-hospital services based on physician/advanced practitioner order or complex needs related to functional status, cognitive ability, or social support system  Outcome: Progressing     Problem: Knowledge Deficit  Goal: Patient/family/caregiver demonstrates understanding of disease process, treatment plan, medications, and discharge instructions  Description: Complete learning assessment and assess knowledge base.  Interventions:  - Provide teaching at level of understanding  - Provide teaching via preferred learning  methods  Outcome: Progressing

## 2025-02-16 LAB
ANION GAP SERPL CALCULATED.3IONS-SCNC: 4 MMOL/L (ref 4–13)
BACTERIA BLD CULT: NORMAL
BACTERIA BLD CULT: NORMAL
BUN SERPL-MCNC: 34 MG/DL (ref 5–25)
CA-I BLD-SCNC: 1.15 MMOL/L (ref 1.12–1.32)
CALCIUM SERPL-MCNC: 8.4 MG/DL (ref 8.4–10.2)
CHLORIDE SERPL-SCNC: 103 MMOL/L (ref 96–108)
CO2 SERPL-SCNC: 36 MMOL/L (ref 21–32)
CREAT SERPL-MCNC: 0.6 MG/DL (ref 0.6–1.3)
ERYTHROCYTE [DISTWIDTH] IN BLOOD BY AUTOMATED COUNT: 16.5 % (ref 11.6–15.1)
GFR SERPL CREATININE-BSD FRML MDRD: 90 ML/MIN/1.73SQ M
GLUCOSE SERPL-MCNC: 112 MG/DL (ref 65–140)
HCT VFR BLD AUTO: 32.2 % (ref 34.8–46.1)
HGB BLD-MCNC: 10.5 G/DL (ref 11.5–15.4)
MAGNESIUM SERPL-MCNC: 2.3 MG/DL (ref 1.9–2.7)
MCH RBC QN AUTO: 29.9 PG (ref 26.8–34.3)
MCHC RBC AUTO-ENTMCNC: 32.6 G/DL (ref 31.4–37.4)
MCV RBC AUTO: 92 FL (ref 82–98)
PLATELET # BLD AUTO: 499 THOUSANDS/UL (ref 149–390)
PMV BLD AUTO: 9.6 FL (ref 8.9–12.7)
POTASSIUM SERPL-SCNC: 4.3 MMOL/L (ref 3.5–5.3)
RBC # BLD AUTO: 3.51 MILLION/UL (ref 3.81–5.12)
SODIUM SERPL-SCNC: 143 MMOL/L (ref 135–147)
WBC # BLD AUTO: 15.08 THOUSAND/UL (ref 4.31–10.16)

## 2025-02-16 PROCEDURE — 82330 ASSAY OF CALCIUM: CPT

## 2025-02-16 PROCEDURE — 99232 SBSQ HOSP IP/OBS MODERATE 35: CPT

## 2025-02-16 PROCEDURE — 80048 BASIC METABOLIC PNL TOTAL CA: CPT

## 2025-02-16 PROCEDURE — 94760 N-INVAS EAR/PLS OXIMETRY 1: CPT

## 2025-02-16 PROCEDURE — 85027 COMPLETE CBC AUTOMATED: CPT

## 2025-02-16 PROCEDURE — 83735 ASSAY OF MAGNESIUM: CPT

## 2025-02-16 PROCEDURE — 94640 AIRWAY INHALATION TREATMENT: CPT

## 2025-02-16 RX ORDER — LORAZEPAM 0.5 MG/1
0.5 TABLET ORAL EVERY 6 HOURS PRN
Status: DISCONTINUED | OUTPATIENT
Start: 2025-02-16 | End: 2025-02-20 | Stop reason: HOSPADM

## 2025-02-16 RX ORDER — LIDOCAINE 50 MG/G
1 PATCH TOPICAL DAILY
Status: DISCONTINUED | OUTPATIENT
Start: 2025-02-16 | End: 2025-02-20 | Stop reason: HOSPADM

## 2025-02-16 RX ADMIN — BUDESONIDE INHALATION 0.5 MG: 0.5 SUSPENSION RESPIRATORY (INHALATION) at 20:00

## 2025-02-16 RX ADMIN — METHYLPREDNISOLONE SODIUM SUCCINATE 40 MG: 40 INJECTION, POWDER, FOR SOLUTION INTRAMUSCULAR; INTRAVENOUS at 08:04

## 2025-02-16 RX ADMIN — IPRATROPIUM BROMIDE 0.5 MG: 0.5 SOLUTION RESPIRATORY (INHALATION) at 13:14

## 2025-02-16 RX ADMIN — LEVALBUTEROL HYDROCHLORIDE 1.25 MG: 1.25 SOLUTION RESPIRATORY (INHALATION) at 20:00

## 2025-02-16 RX ADMIN — LISINOPRIL 2.5 MG: 2.5 TABLET ORAL at 08:04

## 2025-02-16 RX ADMIN — CEFTRIAXONE 1000 MG: 1 INJECTION, SOLUTION INTRAVENOUS at 22:07

## 2025-02-16 RX ADMIN — DULOXETINE HYDROCHLORIDE 30 MG: 30 CAPSULE, DELAYED RELEASE ORAL at 08:04

## 2025-02-16 RX ADMIN — DIVALPROEX SODIUM 250 MG: 125 CAPSULE, COATED PELLETS ORAL at 17:15

## 2025-02-16 RX ADMIN — DIVALPROEX SODIUM 250 MG: 125 CAPSULE, COATED PELLETS ORAL at 08:04

## 2025-02-16 RX ADMIN — FUROSEMIDE 40 MG: 40 TABLET ORAL at 08:04

## 2025-02-16 RX ADMIN — LEVALBUTEROL HYDROCHLORIDE 1.25 MG: 1.25 SOLUTION RESPIRATORY (INHALATION) at 08:47

## 2025-02-16 RX ADMIN — IPRATROPIUM BROMIDE 0.5 MG: 0.5 SOLUTION RESPIRATORY (INHALATION) at 08:47

## 2025-02-16 RX ADMIN — LIDOCAINE 5% 1 PATCH: 700 PATCH TOPICAL at 08:35

## 2025-02-16 RX ADMIN — GUAIFENESIN 600 MG: 600 TABLET ORAL at 08:04

## 2025-02-16 RX ADMIN — EMPAGLIFLOZIN 10 MG: 10 TABLET, FILM COATED ORAL at 08:04

## 2025-02-16 RX ADMIN — ENOXAPARIN SODIUM 40 MG: 40 INJECTION SUBCUTANEOUS at 08:04

## 2025-02-16 RX ADMIN — LORAZEPAM 0.5 MG: 0.5 TABLET ORAL at 22:06

## 2025-02-16 RX ADMIN — OLANZAPINE 7.5 MG: 2.5 TABLET, FILM COATED ORAL at 22:06

## 2025-02-16 RX ADMIN — GUAIFENESIN 600 MG: 600 TABLET ORAL at 17:16

## 2025-02-16 RX ADMIN — CARVEDILOL 3.12 MG: 3.12 TABLET, FILM COATED ORAL at 17:15

## 2025-02-16 RX ADMIN — IPRATROPIUM BROMIDE 0.5 MG: 0.5 SOLUTION RESPIRATORY (INHALATION) at 20:00

## 2025-02-16 RX ADMIN — LEVALBUTEROL HYDROCHLORIDE 1.25 MG: 1.25 SOLUTION RESPIRATORY (INHALATION) at 13:14

## 2025-02-16 RX ADMIN — BUDESONIDE INHALATION 0.5 MG: 0.5 SUSPENSION RESPIRATORY (INHALATION) at 08:47

## 2025-02-16 RX ADMIN — CARVEDILOL 3.12 MG: 3.12 TABLET, FILM COATED ORAL at 08:04

## 2025-02-16 NOTE — ASSESSMENT & PLAN NOTE
Patient went into acute hypoxic respiratory failure was a rapid response on 2/12 secondary to pulmonary edema.   Was given IV Lasix and started on BiPAP   Echocardiogram on 2/13 revealed LVEF 15%, akinetic apical myocardium consistent with Takotsubo cardiomyopathy   Currently on 5L nasal cannula   Transitioned to PO lasix on 2/15/25- continue 40 mg daily   Wean as tolerated   Will likely require home O2 study upon discharge

## 2025-02-16 NOTE — PROGRESS NOTES
Progress Note - Hospitalist   Name: Bea Perez 74 y.o. female I MRN: [id number]  Unit/Bed#: -01 I Date of Admission: 2/11/2025   Date of Service: 2/16/2025 I Hospital Day: 5    Assessment & Plan  Multifocal pneumonia  CT chest shows multifocal pneumonia with dense consolidation in both lower lobes  Urine strep and Legionella negative  Continue Mucinex and breathing treatments   Continue Solu-Medrol 40 mg IV q12 hours   Completed 3 Days of Azithromycin, Currently on Ceftriaxone D5/7 - antibiotic coverage to end 2/18     Acute hypoxic respiratory failure (HCC)  Patient went into acute hypoxic respiratory failure was a rapid response on 2/12 secondary to pulmonary edema.   Was given IV Lasix and started on BiPAP   Echocardiogram on 2/13 revealed LVEF 15%, akinetic apical myocardium consistent with Takotsubo cardiomyopathy   Currently on 5L nasal cannula   Transitioned to PO lasix on 2/15/25- continue 40 mg daily   Wean as tolerated   Will likely require home O2 study upon discharge  Sepsis with acute hypoxic respiratory failure (HCC)  POA with tachycardia, leukocytosis secondary to multifocal pneumonia.  Blood cultures negative, procalcitonin downtrending   See further plan under multifocal pneumona  Bronchitis  No formal diagnosis of COPD, the patient smoked on and off for 30 years. Daughter states she was a closet smoker.  IV Rocephin  and Zithromax  Breathing treatments  IV Solu-Medrol 40 mg IV every 12 hours- will recommend transition to prednisone taper upon discharge.   Current moderate episode of major depressive disorder without prior episode (HCC)  Continue with Depakote, Zyprexa and Cymbalta.  Flash pulmonary edema (HCC)  2/12 had episodes of acute respiratory failure with increased vascular congestion on chest x-ray. Patient received fluids secondary to sepsis  IV lasix initiated  Cardiology following- see plan under takotsubo cardiomyopathy   Anxiety  Patient has Ativan 0.5 mg PO as needed for  anxiety   Elevated troponin  Secondary to demand ischemia   Cardiology following   Denies any current chest pain   Takotsubo cardiomyopathy  A/e/b echo 2/13 EF 15%, G1DD, akinetic apical myocardium   In the setting of anxiety, depression, home stressors   Fluid overloaded on 2/12 requiring BiPAP and Lasix  Cardiology on consult   Start Carvedilol 3.125mg daily and Jardiance daily   Plan to start lisinopril 2/14 as pressures tolerate   Continue with gentle diuresis with lasix to target euvolemic and net balance even to slightly negative   Repeat echocardiogram on 2/17  If LV function continues to improve, then she will not need life vest at discharge. If EF remains below 35%, then she will require life vest   Continuous cardiopulmonary monitoring    VTE Pharmacologic Prophylaxis: VTE Score: 5 High Risk (Score >/= 5) - Pharmacological DVT Prophylaxis Ordered: enoxaparin (Lovenox). Sequential Compression Devices Ordered.    Mobility:   Basic Mobility Inpatient Raw Score: 18  JH-HLM Goal: 6: Walk 10 steps or more  JH-HLM Achieved: 2: Bed activities/Dependent transfer  JH-HLM Goal NOT achieved. Continue with multidisciplinary rounding and encourage appropriate mobility to improve upon JH-HLM goals.    Patient Centered Rounds: I performed bedside rounds with nursing staff today.   Discussions with Specialists or Other Care Team Provider: Case Management     Education and Discussions with Family / Patient: Attempted to update  (daughter) via phone. Unable to contact.    Current Length of Stay: 5 day(s)  Current Patient Status: Inpatient   Certification Statement: The patient will continue to require additional inpatient hospital stay due to IV antibiotics, pending echocardiogram   Discharge Plan: Anticipate discharge in 48 hrs to discharge location to be determined pending rehab evaluations.    Code Status: Level 1 - Full Code    Subjective   Patient seen and evaluated at bedside this AM. She reports to  "feeling \" okay\". She is currently on 5L nasal cannula. Patient reports that she was not on home oxygen prior to admission. She is currently denying any chest pain. Is tolerating PO diet.     Objective :  Temp:  [97.8 °F (36.6 °C)-98.7 °F (37.1 °C)] 97.8 °F (36.6 °C)  HR:  [76-86] 78  BP: (100-120)/(51-63) 110/61  Resp:  [18-24] 24  SpO2:  [91 %-95 %] 94 %  O2 Device: Nasal cannula  Nasal Cannula O2 Flow Rate (L/min):  [5 L/min] 5 L/min    Body mass index is 24.56 kg/m².     Input and Output Summary (last 24 hours):     Intake/Output Summary (Last 24 hours) at 2/16/2025 0940  Last data filed at 2/16/2025 0901  Gross per 24 hour   Intake 1100 ml   Output 1100 ml   Net 0 ml       Physical Exam  Vitals and nursing note reviewed.   Constitutional:       General: She is not in acute distress.     Appearance: She is ill-appearing. She is not diaphoretic.   Cardiovascular:      Rate and Rhythm: Normal rate and regular rhythm.      Heart sounds: No murmur heard.  Pulmonary:      Breath sounds: Rhonchi present.   Abdominal:      General: Bowel sounds are normal. There is no distension.      Palpations: Abdomen is soft.      Tenderness: There is no abdominal tenderness.   Neurological:      Mental Status: She is alert and oriented to person, place, and time.   Psychiatric:         Mood and Affect: Mood is anxious.           Lines/Drains:        Telemetry:  Telemetry Orders (From admission, onward)               LifeVest Patient: Continuous Telemetry Monitoring during hospitalization (non-expiring)  Continuous LifeVest Telemetry Monitoring        References:    LifeVest Policy                     Telemetry Reviewed: Normal Sinus Rhythm  Indication for Continued Telemetry Use: Lifevest (remains on tele entire hospital stay)               Lab Results: I have reviewed the following results:   Results from last 7 days   Lab Units 02/16/25  0514 02/14/25  0611 02/13/25  0449 02/12/25  1931   WBC Thousand/uL 15.08*   < > 24.18* " 29.16*   HEMOGLOBIN g/dL 10.5*   < > 9.3* 10.8*   HEMATOCRIT % 32.2*   < > 28.7* 32.9*   PLATELETS Thousands/uL 499*   < > 297 320   BANDS PCT %  --   --  13*  --    SEGS PCT %  --   --   --  93*   LYMPHO PCT %  --   --  4* 1*   MONO PCT %  --   --  4 5   EOS PCT %  --   --  0 0    < > = values in this interval not displayed.     Results from last 7 days   Lab Units 02/16/25  0514 02/13/25  0449 02/12/25  1931   SODIUM mmol/L 143   < > 140   POTASSIUM mmol/L 4.3   < > 4.3   CHLORIDE mmol/L 103   < > 106   CO2 mmol/L 36*   < > 27   BUN mg/dL 34*   < > 18   CREATININE mg/dL 0.60   < > 0.80   ANION GAP mmol/L 4   < > 7   CALCIUM mg/dL 8.4   < > 8.6   ALBUMIN g/dL  --   --  3.1*   TOTAL BILIRUBIN mg/dL  --   --  0.31   ALK PHOS U/L  --   --  86   ALT U/L  --   --  25   AST U/L  --   --  44*   GLUCOSE RANDOM mg/dL 112   < > 198*    < > = values in this interval not displayed.         Results from last 7 days   Lab Units 02/12/25  1835   POC GLUCOSE mg/dl 203*         Results from last 7 days   Lab Units 02/12/25  2243 02/12/25  1931 02/12/25  0551 02/11/25  2045 02/11/25  1823   LACTIC ACID mmol/L 1.2 2.2*  --  1.8  --    PROCALCITONIN ng/ml  --   --  0.52*  --  1.01*       Recent Cultures (last 7 days):   Results from last 7 days   Lab Units 02/12/25  0551 02/11/25  1834 02/11/25  1823   BLOOD CULTURE   --  No Growth After 4 Days. No Growth After 4 Days.   LEGIONELLA URINARY ANTIGEN  Negative  --   --        Imaging Results Review: I reviewed radiology reports from this admission including: chest xray and Echocardiogram.  Other Study Results Review: No additional pertinent studies reviewed.    Last 24 Hours Medication List:     Current Facility-Administered Medications:     acetaminophen (TYLENOL) tablet 650 mg, Q6H PRN    budesonide (PULMICORT) inhalation solution 0.5 mg, Q12H    carvedilol (COREG) tablet 3.125 mg, BID With Meals    cefTRIAXone (ROCEPHIN) IVPB (premix in dextrose) 1,000 mg 50 mL, Q24H, Last Rate:  Stopped (02/15/25 2024)    divalproex sodium (DEPAKOTE SPRINKLE) capsule 250 mg, BID    DULoxetine (CYMBALTA) delayed release capsule 30 mg, Daily    Empagliflozin (JARDIANCE) tablet 10 mg, Daily    enoxaparin (LOVENOX) subcutaneous injection 40 mg, Daily    furosemide (LASIX) tablet 40 mg, Daily    guaiFENesin (MUCINEX) 12 hr tablet 600 mg, BID    ipratropium (ATROVENT) 0.02 % inhalation solution 0.5 mg, TID    levalbuterol (XOPENEX) inhalation solution 1.25 mg, TID **AND** [DISCONTINUED] sodium chloride 0.9 % inhalation solution 3 mL, TID    lidocaine (LIDODERM) 5 % patch 1 patch, Daily    lisinopril (ZESTRIL) tablet 2.5 mg, Daily    LORazepam (ATIVAN) tablet 0.5 mg, Q6H PRN    OLANZapine (ZyPREXA) tablet 7.5 mg, HS    perflutren lipid microsphere (DEFINITY) injection, Once in imaging    Administrative Statements   Today, Patient Was Seen By: Kusum Ballesteros PA-C      **Please Note: This note may have been constructed using a voice recognition system.**

## 2025-02-16 NOTE — ASSESSMENT & PLAN NOTE
POA with tachycardia, leukocytosis secondary to multifocal pneumonia.  Blood cultures negative, procalcitonin downtrending   See further plan under multifocal pneumona

## 2025-02-16 NOTE — ASSESSMENT & PLAN NOTE
No formal diagnosis of COPD, the patient smoked on and off for 30 years. Daughter states she was a closet smoker.  IV Rocephin  and Zithromax  Breathing treatments  IV Solu-Medrol 40 mg IV every 12 hours- will recommend transition to prednisone taper upon discharge.

## 2025-02-16 NOTE — ASSESSMENT & PLAN NOTE
A/e/b echo 2/13 EF 15%, G1DD, akinetic apical myocardium   In the setting of anxiety, depression, home stressors   Fluid overloaded on 2/12 requiring BiPAP and Lasix  Cardiology on consult   Start Carvedilol 3.125mg daily and Jardiance daily   Plan to start lisinopril 2/14 as pressures tolerate   Continue with gentle diuresis with lasix to target euvolemic and net balance even to slightly negative   Repeat echocardiogram on 2/17  If LV function continues to improve, then she will not need life vest at discharge. If EF remains below 35%, then she will require life vest   Continuous cardiopulmonary monitoring

## 2025-02-16 NOTE — ASSESSMENT & PLAN NOTE
2/12 had episodes of acute respiratory failure with increased vascular congestion on chest x-ray. Patient received fluids secondary to sepsis  IV lasix initiated  Cardiology following- see plan under takotsubo cardiomyopathy

## 2025-02-16 NOTE — PLAN OF CARE
Problem: Potential for Falls  Goal: Patient will remain free of falls  Description: INTERVENTIONS:  - Educate patient/family on patient safety including physical limitations  - Instruct patient to call for assistance with activity   - Consult OT/PT to assist with strengthening/mobility   - Keep Call bell within reach  - Keep bed low and locked with side rails adjusted as appropriate  - Keep care items and personal belongings within reach  - Initiate and maintain comfort rounds  - Make Fall Risk Sign visible to staff  - Offer Toileting every 2 Hours, in advance of need  - Initiate/Maintain bed/chair alarm  - Obtain necessary fall risk management equipment: na  - Apply yellow socks and bracelet for high fall risk patients  - Consider moving patient to room near nurses station  Outcome: Progressing     Problem: PAIN - ADULT  Goal: Verbalizes/displays adequate comfort level or baseline comfort level  Description: Interventions:  - Encourage patient to monitor pain and request assistance  - Assess pain using appropriate pain scale  - Administer analgesics based on type and severity of pain and evaluate response  - Implement non-pharmacological measures as appropriate and evaluate response  - Consider cultural and social influences on pain and pain management  - Notify physician/advanced practitioner if interventions unsuccessful or patient reports new pain  Outcome: Progressing     Problem: INFECTION - ADULT  Goal: Absence or prevention of progression during hospitalization  Description: INTERVENTIONS:  - Assess and monitor for signs and symptoms of infection  - Monitor lab/diagnostic results  - Monitor all insertion sites, i.e. indwelling lines, tubes, and drains  - Monitor endotracheal if appropriate and nasal secretions for changes in amount and color  - [place] appropriate cooling/warming therapies per order  - Administer medications as ordered  - Instruct and encourage patient and family to use good hand hygiene  technique  - Identify and instruct in appropriate isolation precautions for identified infection/condition  Outcome: Progressing     Problem: SAFETY ADULT  Goal: Patient will remain free of falls  Description: INTERVENTIONS:  - Educate patient/family on patient safety including physical limitations  - Instruct patient to call for assistance with activity   - Consult OT/PT to assist with strengthening/mobility   - Keep Call bell within reach  - Keep bed low and locked with side rails adjusted as appropriate  - Keep care items and personal belongings within reach  - Initiate and maintain comfort rounds  - Make Fall Risk Sign visible to staff  - Offer Toileting every 2 Hours, in advance of need  - Initiate/Maintain bed/chair alarm  - Obtain necessary fall risk management equipment: na  - Apply yellow socks and bracelet for high fall risk patients  - Consider moving patient to room near nurses station  Outcome: Progressing     Problem: DISCHARGE PLANNING  Goal: Discharge to home or other facility with appropriate resources  Description: INTERVENTIONS:  - Identify barriers to discharge w/patient and caregiver  - Arrange for needed discharge resources and transportation as appropriate  - Identify discharge learning needs (meds, wound care, etc.)  - Arrange for interpretive services to assist at discharge as needed  - Refer to Case Management Department for coordinating discharge planning if the patient needs post-hospital services based on physician/advanced practitioner order or complex needs related to functional status, cognitive ability, or social support system  Outcome: Progressing     Problem: Knowledge Deficit  Goal: Patient/family/caregiver demonstrates understanding of disease process, treatment plan, medications, and discharge instructions  Description: Complete learning assessment and assess knowledge base.  Interventions:  - Provide teaching at level of understanding  - Provide teaching via preferred learning  methods  Outcome: Progressing

## 2025-02-16 NOTE — ASSESSMENT & PLAN NOTE
CT chest shows multifocal pneumonia with dense consolidation in both lower lobes  Urine strep and Legionella negative  Continue Mucinex and breathing treatments   Continue Solu-Medrol 40 mg IV q12 hours   Completed 3 Days of Azithromycin, Currently on Ceftriaxone D5/7 - antibiotic coverage to end 2/18

## 2025-02-17 ENCOUNTER — APPOINTMENT (INPATIENT)
Dept: NON INVASIVE DIAGNOSTICS | Facility: HOSPITAL | Age: 75
DRG: 871 | End: 2025-02-17
Payer: MEDICARE

## 2025-02-17 PROBLEM — R57.0 CARDIOGENIC SHOCK (HCC): Status: ACTIVE | Noted: 2025-02-17

## 2025-02-17 PROBLEM — R57.0 CARDIOGENIC SHOCK (HCC): Status: RESOLVED | Noted: 2025-02-17 | Resolved: 2025-02-17

## 2025-02-17 LAB
BSA FOR ECHO PROCEDURE: 1.61 M2
SL CV LV EF: 45

## 2025-02-17 PROCEDURE — 97167 OT EVAL HIGH COMPLEX 60 MIN: CPT

## 2025-02-17 PROCEDURE — 94760 N-INVAS EAR/PLS OXIMETRY 1: CPT

## 2025-02-17 PROCEDURE — 97116 GAIT TRAINING THERAPY: CPT

## 2025-02-17 PROCEDURE — 99232 SBSQ HOSP IP/OBS MODERATE 35: CPT

## 2025-02-17 PROCEDURE — 97535 SELF CARE MNGMENT TRAINING: CPT

## 2025-02-17 PROCEDURE — 93308 TTE F-UP OR LMTD: CPT

## 2025-02-17 PROCEDURE — 94640 AIRWAY INHALATION TREATMENT: CPT

## 2025-02-17 PROCEDURE — 97163 PT EVAL HIGH COMPLEX 45 MIN: CPT

## 2025-02-17 RX ADMIN — CARVEDILOL 3.12 MG: 3.12 TABLET, FILM COATED ORAL at 09:36

## 2025-02-17 RX ADMIN — ENOXAPARIN SODIUM 40 MG: 40 INJECTION SUBCUTANEOUS at 09:36

## 2025-02-17 RX ADMIN — FUROSEMIDE 40 MG: 40 TABLET ORAL at 09:36

## 2025-02-17 RX ADMIN — LEVALBUTEROL HYDROCHLORIDE 1.25 MG: 1.25 SOLUTION RESPIRATORY (INHALATION) at 19:12

## 2025-02-17 RX ADMIN — DULOXETINE HYDROCHLORIDE 30 MG: 30 CAPSULE, DELAYED RELEASE ORAL at 09:36

## 2025-02-17 RX ADMIN — LISINOPRIL 2.5 MG: 2.5 TABLET ORAL at 09:36

## 2025-02-17 RX ADMIN — GUAIFENESIN 600 MG: 600 TABLET ORAL at 09:36

## 2025-02-17 RX ADMIN — DIVALPROEX SODIUM 250 MG: 125 CAPSULE, COATED PELLETS ORAL at 17:45

## 2025-02-17 RX ADMIN — LEVALBUTEROL HYDROCHLORIDE 1.25 MG: 1.25 SOLUTION RESPIRATORY (INHALATION) at 14:08

## 2025-02-17 RX ADMIN — ACETAMINOPHEN 325MG 650 MG: 325 TABLET ORAL at 19:36

## 2025-02-17 RX ADMIN — IPRATROPIUM BROMIDE 0.5 MG: 0.5 SOLUTION RESPIRATORY (INHALATION) at 07:59

## 2025-02-17 RX ADMIN — LORAZEPAM 0.5 MG: 0.5 TABLET ORAL at 22:04

## 2025-02-17 RX ADMIN — GUAIFENESIN 600 MG: 600 TABLET ORAL at 17:45

## 2025-02-17 RX ADMIN — DIVALPROEX SODIUM 250 MG: 125 CAPSULE, COATED PELLETS ORAL at 09:36

## 2025-02-17 RX ADMIN — IPRATROPIUM BROMIDE 0.5 MG: 0.5 SOLUTION RESPIRATORY (INHALATION) at 19:12

## 2025-02-17 RX ADMIN — CARVEDILOL 3.12 MG: 3.12 TABLET, FILM COATED ORAL at 17:44

## 2025-02-17 RX ADMIN — BUDESONIDE INHALATION 0.5 MG: 0.5 SUSPENSION RESPIRATORY (INHALATION) at 07:59

## 2025-02-17 RX ADMIN — IPRATROPIUM BROMIDE 0.5 MG: 0.5 SOLUTION RESPIRATORY (INHALATION) at 14:08

## 2025-02-17 RX ADMIN — BUDESONIDE INHALATION 0.5 MG: 0.5 SUSPENSION RESPIRATORY (INHALATION) at 19:12

## 2025-02-17 RX ADMIN — LEVALBUTEROL HYDROCHLORIDE 1.25 MG: 1.25 SOLUTION RESPIRATORY (INHALATION) at 07:59

## 2025-02-17 RX ADMIN — OLANZAPINE 7.5 MG: 2.5 TABLET, FILM COATED ORAL at 22:04

## 2025-02-17 RX ADMIN — LIDOCAINE 5% 1 PATCH: 700 PATCH TOPICAL at 09:36

## 2025-02-17 RX ADMIN — EMPAGLIFLOZIN 10 MG: 10 TABLET, FILM COATED ORAL at 12:06

## 2025-02-17 RX ADMIN — CEFTRIAXONE 1000 MG: 1 INJECTION, SOLUTION INTRAVENOUS at 19:34

## 2025-02-17 NOTE — OCCUPATIONAL THERAPY NOTE
Occupational Therapy Evaluation     Patient Name: Bea Perez  Today's Date: 2/17/2025  Problem List  Principal Problem:    Multifocal pneumonia  Active Problems:    Current moderate episode of major depressive disorder without prior episode (HCC)    Bronchitis    Acute hypoxic respiratory failure (HCC)    Sepsis with acute hypoxic respiratory failure (HCC)    Flash pulmonary edema (HCC)    Anxiety    Elevated troponin    Takotsubo cardiomyopathy    Past Medical History  Past Medical History:   Diagnosis Date    Depression     Esophageal stricture     History of gastroesophageal reflux (GERD)     Iron deficiency anemia     Meniere disease      Past Surgical History  Past Surgical History:   Procedure Laterality Date    COLONOSCOPY  2012    FL LUMBAR PUNCTURE DIAGNOSTIC  12/19/2024    GALLBLADDER SURGERY      HYSTERECTOMY      OTHER SURGICAL HISTORY      Endoscopy of stricture 2010, 2012, 5/2014 - Esophageal ulcer; 7/2014 - Esophageal ulcer; 10/2015 (Dr. Jones) - Esophageal stricture; 10/2016; 11/2017 - Stricture           02/17/25 1038   OT Last Visit   OT Visit Date 02/17/25   Note Type   Note type Evaluation   Pain Assessment   Pain Assessment Tool 0-10   Pain Score No Pain   Restrictions/Precautions   Weight Bearing Precautions Per Order No   Other Precautions Chair Alarm;Bed Alarm;Multiple lines;O2;Fall Risk  (5 LPM via NC)   Home Living   Type of Home House  (1 EJ)   Home Layout One level;Performs ADLs on one level;Able to live on main level with bedroom/bathroom;Stairs to enter without rails   Bathroom Shower/Tub Walk-in shower   Bathroom Toilet Standard   Bathroom Equipment Grab bars in shower;Shower chair;Commode;Grab bars around toilet   Bathroom Accessibility Accessible via walker   Home Equipment Walker;Cane;Wheelchair-manual;Grab bars;Sock aid   Additional Comments Patient lives in a one  with one EJ ( no HR).  FF to the basement which patient does not access.  Pt did not use any AD at  baseline.   Prior Function   Level of [place] Independent with ADLs;Independent with functional mobility;Independent with IADLS   Lives With Spouse   Receives Help From Family   IADLs Family/Friend/Other provides transportation;Family/Friend/Other provides meals;Family/Friend/Other provides medication management  (Pt reports she just got her 's license back and plans to get back to driving)   Falls in the last 6 months 0   ADL   Where Assessed Edge of bed   UB Dressing Assistance 5  Supervision/Setup   LB Dressing Assistance 5  Supervision/Setup   LB Dressing Deficit   (donning/doffing B socks)   Additional Comments Toileting and grooming completed during session. See treatment assessment for further details.   Bed Mobility   Supine to Sit 5  Supervision   Additional items Increased time required;Bedrails   Additional Comments Patient completed supine > sit with S, increased time and VC's   Transfers   Sit to Stand 4  Minimal assistance   Additional items Assist x 1;Increased time required;Verbal cues   Stand to Sit 4  Minimal assistance   Additional items Assist x 1;Increased time required;Verbal cues   Stand pivot 4  Minimal assistance   Additional items Assist x 1;Increased time required;Verbal cues   Additional Comments Pt walked short household distance with occasional Min A and ww, then walked short household distance with Min A and no device.   Functional Mobility   Functional Mobility 4  Minimal assistance   Additional Comments Pt walked short household distance with occasional Min A and ww, then walked short household distance with Min A and no device.   Balance   Static Sitting Good   Dynamic Sitting Fair +   Static Standing Fair   Dynamic Standing Fair -   Activity Tolerance   Activity Tolerance Patient limited by fatigue   Medical Staff Made Aware PT Randi   Nurse Made Aware ROGELIO ENGEL Assessment   RUE Assessment WFL   LUE Assessment   LUE Assessment WFL   Hand Function   Gross Motor  Coordination Functional   Fine Motor Coordination Functional   Hand Function Comments R Hand Dominant   Cognition   Overall Cognitive Status WFL   Arousal/Participation Alert;Responsive;Cooperative  (allowed her  to answer intake questions for her due to c/o sore throat)   Attention Within functional limits   Orientation Level Oriented X4   Memory Within functional limits   Following Commands Follows one step commands without difficulty   Assessment   Limitation Decreased ADL status;Decreased UE strength;Decreased endurance;Decreased self-care trans;Decreased high-level ADLs   Prognosis Good   Assessment Pt is a 74 y.o. female, [facility] d/t experiencing worsening shortness of breath and a cough, with spO2 at 86% on room air at urgent care. Dx: multifocal pneumonia. Pt with PMHx impacting their performance during ADL tasks, including: depression, esophageal stricture, GERD, Meniere disease. Prior to admission to the hospital Pt was performing ADLs without physical assistance. IADLs without physical assistance. Functional transfers/ambulation without physical assistance. Cognitive status was PTA was WFL. OT order placed to assess Pt's ADLs, cognitive status, and performance during functional tasks in order to maximize safety and independence while making most appropriate d/c recommendations. PT/OT co-evaluation completed at this time d/t significant mobility deficits and safety concerns. Pt's clinical presentation is currently unstable/unpredictable given new onset deficits that effect Pt's occupational performance and ability to safely return to PLOF including decrease activity tolerance, decrease standing balance, decrease sitting balance, decrease performance during ADL tasks, increased pain, decrease generalized strength, decrease activity engagement, and decrease performance during functional transfers combined with medical complications of new onset O2 use and need for input for mobility  technique/safety. Personal factors affecting Pt at time of initial evaluation include: inability to perform IADLs, inability to perform ADLs, inability to ambulate household distances, inability to navigate community distances, and decreased initiation and engagement. Pt will benefit from continued skilled OT services to address deficits as defined above and to maximize level independence/participation during ADLs and functional tasks to facilitate return toward PLOF and improved quality of life. From an occupational therapy standpoint, recommendation at time of d/c would be Level II: Moderate Resource Intensity.   Plan   Treatment Interventions ADL retraining;Functional transfer training;UE strengthening/ROM;Endurance training;Patient/family training;Compensatory technique education;Energy conservation;Activityengagement;Cardiac education   Goal Expiration Date 03/03/25   OT Treatment Day 1   OT Frequency 2-3x/wk   Discharge Recommendation   Rehab Resource Intensity Level, OT II (Moderate Resource Intensity)   AM-PAC Daily Activity Inpatient   Lower Body Dressing 3   Bathing 4   Toileting 3   Upper Body Dressing 3   Grooming 3   Eating 4   Daily Activity Raw Score 20   Daily Activity Standardized Score (Calc for Raw Score >=11) 42.03   AM-PAC Applied Cognition Inpatient   Following a Speech/Presentation 4   Understanding Ordinary Conversation 4   Taking Medications 4   Remembering Where Things Are Placed or Put Away 4   Remembering List of 4-5 Errands 4   Taking Care of Complicated Tasks 3   Applied Cognition Raw Score 23   Applied Cognition Standardized Score 53.08   Additional Treatment Session   Start Time 1054   End Time 1104   Treatment Assessment Treatment session initiated with pt standing in bathroom after walking with PT. Pt completed toilet transfer with SPV-Min A and ww, then used no device with Min A out of the bathroom. Pt completed toileting with SPV and grooming with SPV while standing at sink. Pt  remained on 6 LPM with activity, then was placed back on 5 LPM at end of session. Pt's spO2 remained 91-93% on 5 LPM after short rest break, but was 87% on 6 LPM immediately following mobility. Discussed evaluation findings and recommendations with pt and spouse. Continue with plan of care.   Additional Treatment Day 1   End of Consult   Education Provided Family or social support of family present for education by provider;Yes   Patient Position at End of Consult Bedside chair;All needs within reach;Bed/Chair alarm activated   Nurse Communication Nurse aware of consult     The patient's raw score on the AM-PAC Daily Activity Inpatient Short Form is 20. A raw score of greater than or equal to 19 suggests the patient may benefit from discharge to post-acute rehabilitation services. Please refer to the recommendation of the Occupational Therapist for safe discharge planning.    Pt goals to be met by 3/3/2025    Pt will demonstrate ability to complete grooming/hygiene tasks @ independent after set-up.  Pt will demonstrate ability to complete supine<>sit @ independent in order to increase safety and independence during ADL tasks.  Pt will demonstrate ability to complete LB dressing @ independent in order to increase safety and independence during meaningful tasks.   Pt will demonstrate ability to brigido/doff socks/shoes while sitting edge of bed @ independent in order to increase safety and independence during meaningful tasks.   Pt will demonstrate ability to complete toileting tasks including CM and pericare @ independent in order to increase safety and independence during meaningful tasks.  Pt will demonstrate ability to complete EOB, chair, toilet/commode transfers @ independent in order to increase performance and participation during functional tasks.  Pt will demonstrate ability to stand for 5 minutes while maintaining good balance with use of no device for UB support PRN.  Pt will demonstrate ability to tolerate  30-35 minute OT session with no vc'ing for deep breathing or use of energy conservation techniques in order to increase activity tolerance during functional tasks.   Pt will demonstrate Good carryover of use of energy conservation/compensatory strategies during ADLs and functional tasks in order to increase safety and reduce risk for falls.   Pt will demonstrate Good attention and participation in continued evaluation of functional ambulation house hold distances in order to assist with safe d/c planning.  Pt will attend to continued cognitive assessments 100% of the time in order to provide most appropriate d/c recommendations.   Pt will follow 100% simple 2-step commands and be A&O x4 consistently with environmental cues to increase participation in functional activities.   Pt will identify 3 areas of interest/hobbies and 1 intervention on how to incorporate into daily life in order to increase interaction with environment and peers as well as increase participation in meaningful tasks.   Pt will demonstrate 100% carryover of BUE HEP in order to increase BUE MS and increase performance during functional tasks upon d/c home.    Zana Floyd, OTR/L

## 2025-02-17 NOTE — PLAN OF CARE
Problem: PHYSICAL THERAPY ADULT  Goal: Performs mobility at highest level of function for planned discharge setting.  See evaluation for individualized goals.  Description: Treatment/Interventions: ADL retraining, Functional transfer training, LE strengthening/ROM, Therapeutic exercise, Endurance training, Patient/family training, Equipment eval/education, Bed mobility, Gait training, Compensatory technique education, Spoke to nursing, OT          See flowsheet documentation for full assessment, interventions and recommendations.  Note: Prognosis: Excellent  Problem List: Decreased strength, Decreased endurance, Impaired balance, Decreased mobility, Pain  Assessment: Pt is a 74 y.o. female seen for PT evaluation s/p admission to [facility] on 2/11/2025 with Multifocal pneumonia.  Order placed for PT services.  Upon evaluation: Pt is presenting with impaired functional mobility due to pain, decreased strength, decreased endurance, impaired balance, gait deviations, decreased safety awareness, fall risk, and LE edema requiring  S assistance for bed mobility, min assistance for transfers, and min assistance for ambulation with LRAD . Pt's clinical presentation is currently unstable/unpredictable given the functional mobility deficits above, especially weakness, edema of extremities, decreased endurance, impaired balance, gait deviations, pain, decreased activity tolerance, decreased functional mobility tolerance, decreased safety awareness, and SOB upon exertion, coupled with fall risks as indicated by AM-PAC 6-Clicks: 19/24 as well as impaired balance, polypharmacy, and decreased safety awareness and combined with medical complications of tachycardia, pain impacting overall mobility status, abnormal renal lab values, abnormal H&H, abnormal WBCs, abnormal CBC, abnormal blood sugars, low SpO2 values, new onset O2 use, increased O2 needs from baseline, abnormal CO2 values, fear/retreat, need for  input for mobility technique/safety, and pneumonia, acute hypoxic RF, sepsis with RF, bronchitis, current episode of major depressive diorder .  Pt's PMHx and comorbidities that may affect physical performance and progress include: depression and esophageal stricture, GERD, anemia, Meniere's . Personal factors affecting pt at time of IE include: depression, multi-level environment, inability to perform IADLs, inability to perform ADLs, inability to navigate level surfaces without external assistance, inability to ambulate household distances, and inability to navigate community distances. Pt will benefit from continued skilled PT services to address deficits as defined above and to maximize level of functional mobility to facilitate return toward PLOF and improved QOL. From PT/mobility standpoint, recommendation at time of d/c would be Level II (Moderate Resource Intensity pending progress in order to reduce fall risk and maximize pt's functional independence and consistency with mobility in order to facilitate return to PLOF.  Recommend ther ex next 1-2 sessions.  Barriers to Discharge: Other (Comment)  Barriers to Discharge Comments: Decreased endurance/funcitonal activity tolerance, increased need for supplemental O2 from baseline, decreased mobility, increased caregiver A  Rehab Resource Intensity Level, PT: II (Moderate Resource Intensity)    See flowsheet documentation for full assessment.

## 2025-02-17 NOTE — ASSESSMENT & PLAN NOTE
in the setting of Takotsubo CM, a/e/b low cardiac function , BP 80s/50s not responding to IVF and IV Albumin, requiring and resolved with IV Levophed.        Findings: Patient's blood pressure dropped gave albumin if no response to fluids and albumin started on low-dose Levophed.   Most likely related to PNA and low cardiac function    Has resolved

## 2025-02-17 NOTE — ASSESSMENT & PLAN NOTE
A/e/b echo 2/13 EF 15%, G1DD, akinetic apical myocardium   In the setting of anxiety, depression, home stressors   Fluid overloaded on 2/12 requiring BiPAP and Lasix  Cardiology on consult   GDMT - Lasix 40 mg daily, Carvedilol 3.125mg daily, lisinopril 2.5 mg daily, Jardiance daily   Target euvolemic and net balance even to slightly negative   Repeat echocardiogram on 2/17  LVEF 45% - will not order LifeVest as OP with improving EF  Continuous cardiopulmonary monitoring

## 2025-02-17 NOTE — PROGRESS NOTES
Progress Note - Hospitalist   Name: Bea Perez 74 y.o. female I MRN: [id number]  Unit/Bed#: -01 I Date of Admission: 2/11/2025   Date of Service: 2/17/2025 I Hospital Day: 6    Assessment & Plan  Multifocal pneumonia  CT chest shows multifocal pneumonia with dense consolidation in both lower lobes  Urine strep and Legionella negative  Continue Mucinex and breathing treatments   Initially on Solu-Medrol 40 mg IV q hours, has been weaned and stopped   Completed 3 Days of Azithromycin, Currently on Ceftriaxone - antibiotic coverage to end 2/18     Acute hypoxic respiratory failure (HCC)  Patient went into acute hypoxic respiratory failure was a rapid response on 2/12 secondary to pulmonary edema.   Was given IV Lasix and started on BiPAP   Echocardiogram on 2/13 revealed LVEF 15%, akinetic apical myocardium consistent with Takotsubo cardiomyopathy   Repeat with improved EF see below   Transitioned to PO lasix on 2/15/25- continue 40 mg daily   Currently on 5L nasal cannula -> weaned to 4L today    Wean as tolerated   Will likely require home O2 study upon discharge  Takotsubo cardiomyopathy  A/e/b echo 2/13 EF 15%, G1DD, akinetic apical myocardium   In the setting of anxiety, depression, home stressors   Fluid overloaded on 2/12 requiring BiPAP and Lasix  Cardiology on consult   GDMT - Lasix 40 mg daily, Carvedilol 3.125mg daily, lisinopril 2.5 mg daily, Jardiance daily   Target euvolemic and net balance even to slightly negative   Repeat echocardiogram on 2/17  LVEF 45% - will not order LifeVest as OP with improving EF  Continuous cardiopulmonary monitoring  Sepsis with acute hypoxic respiratory failure (HCC)  POA with tachycardia, leukocytosis secondary to multifocal pneumonia.  Blood cultures negative, procalcitonin downtrending   See further plan under multifocal pneumona  Bronchitis  No formal diagnosis of COPD, the patient smoked on and off for 30 years.   IV Rocephin  and Zithromax  Breathing  treatments  IV solumedrol initially q 8 hours - has been weaned to stop    Current moderate episode of major depressive disorder without prior episode (HCC)  Continue with Depakote, Zyprexa and Cymbalta.  Flash pulmonary edema (HCC)  2/12 had episodes of acute respiratory failure with increased vascular congestion on chest x-ray. Patient received fluids secondary to sepsis  IV lasix initiated  Cardiology following- see plan under takotsubo cardiomyopathy   Has resolved   Anxiety  Patient has Ativan 0.5 mg PO as needed for anxiety   Elevated troponin  Secondary to demand ischemia   Cardiology following   Denies any current chest pain   Cardiogenic shock (HCC) (Resolved: 2/17/2025)   in the setting of Takotsubo CM, a/e/b low cardiac function , BP 80s/50s not responding to IVF and IV Albumin, requiring and resolved with IV Levophed.        Findings: Patient's blood pressure dropped gave albumin if no response to fluids and albumin started on low-dose Levophed.   Most likely related to PNA and low cardiac function    Has resolved    VTE Pharmacologic Prophylaxis: VTE Score: 5 High Risk (Score >/= 5) - Pharmacological DVT Prophylaxis Ordered: enoxaparin (Lovenox). Sequential Compression Devices Ordered.    Mobility:   Basic Mobility Inpatient Raw Score: 18  JH-HLM Goal: 6: Walk 10 steps or more  JH-HLM Achieved: 4: Move to chair/commode  JH-HLM Goal NOT achieved. Continue with multidisciplinary rounding and encourage appropriate mobility to improve upon JH-HLM goals.    Patient Centered Rounds: I performed bedside rounds with nursing staff today.   Discussions with Specialists or Other Care Team Provider: CM, cardiology     Education and Discussions with Family / Patient: Attempted to update  (daughter) via phone. Left voicemail.     Current Length of Stay: 6 day(s)  Current Patient Status: Inpatient   Certification Statement: The patient will continue to require additional inpatient hospital stay due to  pending weaning of oxygen  Discharge Plan: Anticipate discharge in 24-48 hrs to home with home services.    Code Status: Level 1 - Full Code    Subjective   Seen and examined.  Patient endorses that she is starting to feel a bit better.  She is producing significant sputum.  Breathing treatments are helping.     Objective :  Temp:  [97.5 °F (36.4 °C)-98 °F (36.7 °C)] 97.5 °F (36.4 °C)  HR:  [73-79] 79  BP: (104-125)/(53-61) 125/59  Resp:  [18-26] 21  SpO2:  [90 %-94 %] 91 %  O2 Device: Nasal cannula  Nasal Cannula O2 Flow Rate (L/min):  [5 L/min] 5 L/min    Body mass index is 24.51 kg/m².     Input and Output Summary (last 24 hours):     Intake/Output Summary (Last 24 hours) at 2/17/2025 1032  Last data filed at 2/17/2025 1020  Gross per 24 hour   Intake 530 ml   Output 750 ml   Net -220 ml       Physical Exam  Vitals and nursing note reviewed.   Constitutional:       General: She is not in acute distress.     Appearance: Normal appearance. She is not ill-appearing.   HENT:      Head: Normocephalic and atraumatic.      Nose: No congestion.      Mouth/Throat:      Mouth: Mucous membranes are moist.      Pharynx: Oropharynx is clear.   Eyes:      Conjunctiva/sclera: Conjunctivae normal.   Cardiovascular:      Rate and Rhythm: Normal rate and regular rhythm.      Pulses: Normal pulses.      Heart sounds: Normal heart sounds. No murmur heard.  Pulmonary:      Effort: Pulmonary effort is normal. No respiratory distress.      Comments: Diminished breath sounds bilaterally  Abdominal:      General: Bowel sounds are normal.      Palpations: Abdomen is soft.      Tenderness: There is no abdominal tenderness.   Musculoskeletal:         General: Normal range of motion.      Cervical back: Normal range of motion.      Right lower leg: No edema.      Left lower leg: No edema.   Skin:     General: Skin is warm and dry.   Neurological:      Mental Status: She is alert and oriented to person, place, and time.   Psychiatric:          Mood and Affect: Mood normal.         Behavior: Behavior normal.           Lines/Drains:        Telemetry:  Telemetry Orders (From admission, onward)               LifeVest Patient: Continuous Telemetry Monitoring during hospitalization (non-expiring)  Continuous LifeVest Telemetry Monitoring        References:    LifeVest Policy                     Telemetry Reviewed: Normal Sinus Rhythm  Indication for Continued Telemetry Use: Acute CHF on >200 mg lasix/day or equivalent dose or with new reduced EF.                Lab Results: I have reviewed the following results:   Results from last 7 days   Lab Units 02/16/25  0514 02/14/25  0611 02/13/25 0449 02/12/25 1931   WBC Thousand/uL 15.08*   < > 24.18* 29.16*   HEMOGLOBIN g/dL 10.5*   < > 9.3* 10.8*   HEMATOCRIT % 32.2*   < > 28.7* 32.9*   PLATELETS Thousands/uL 499*   < > 297 320   BANDS PCT %  --   --  13*  --    SEGS PCT %  --   --   --  93*   LYMPHO PCT %  --   --  4* 1*   MONO PCT %  --   --  4 5   EOS PCT %  --   --  0 0    < > = values in this interval not displayed.     Results from last 7 days   Lab Units 02/16/25  0514 02/13/25 0449 02/12/25 1931   SODIUM mmol/L 143   < > 140   POTASSIUM mmol/L 4.3   < > 4.3   CHLORIDE mmol/L 103   < > 106   CO2 mmol/L 36*   < > 27   BUN mg/dL 34*   < > 18   CREATININE mg/dL 0.60   < > 0.80   ANION GAP mmol/L 4   < > 7   CALCIUM mg/dL 8.4   < > 8.6   ALBUMIN g/dL  --   --  3.1*   TOTAL BILIRUBIN mg/dL  --   --  0.31   ALK PHOS U/L  --   --  86   ALT U/L  --   --  25   AST U/L  --   --  44*   GLUCOSE RANDOM mg/dL 112   < > 198*    < > = values in this interval not displayed.         Results from last 7 days   Lab Units 02/12/25  1835   POC GLUCOSE mg/dl 203*         Results from last 7 days   Lab Units 02/12/25  2243 02/12/25  1931 02/12/25  0551 02/11/25  2045 02/11/25  1823   LACTIC ACID mmol/L 1.2 2.2*  --  1.8  --    PROCALCITONIN ng/ml  --   --  0.52*  --  1.01*       Recent Cultures (last 7 days):   Results from last  7 days   Lab Units 02/12/25  0551 02/11/25  1834 02/11/25  1823   BLOOD CULTURE   --  No Growth After 5 Days. No Growth After 5 Days.   LEGIONELLA URINARY ANTIGEN  Negative  --   --              Last 24 Hours Medication List:     Current Facility-Administered Medications:     acetaminophen (TYLENOL) tablet 650 mg, Q6H PRN    budesonide (PULMICORT) inhalation solution 0.5 mg, Q12H    carvedilol (COREG) tablet 3.125 mg, BID With Meals    cefTRIAXone (ROCEPHIN) IVPB (premix in dextrose) 1,000 mg 50 mL, Q24H, Last Rate: Stopped (02/16/25 2340)    divalproex sodium (DEPAKOTE SPRINKLE) capsule 250 mg, BID    DULoxetine (CYMBALTA) delayed release capsule 30 mg, Daily    Empagliflozin (JARDIANCE) tablet 10 mg, Daily    enoxaparin (LOVENOX) subcutaneous injection 40 mg, Daily    furosemide (LASIX) tablet 40 mg, Daily    guaiFENesin (MUCINEX) 12 hr tablet 600 mg, BID    ipratropium (ATROVENT) 0.02 % inhalation solution 0.5 mg, TID    levalbuterol (XOPENEX) inhalation solution 1.25 mg, TID **AND** [DISCONTINUED] sodium chloride 0.9 % inhalation solution 3 mL, TID    lidocaine (LIDODERM) 5 % patch 1 patch, Daily    lisinopril (ZESTRIL) tablet 2.5 mg, Daily    LORazepam (ATIVAN) tablet 0.5 mg, Q6H PRN    OLANZapine (ZyPREXA) tablet 7.5 mg, HS    perflutren lipid microsphere (DEFINITY) injection, Once in imaging    Administrative Statements   Today, Patient Was Seen By: Suzi Bermudez PA-C      **Please Note: This note may have been constructed using a voice recognition system.**

## 2025-02-17 NOTE — CASE MANAGEMENT
Case Management Discharge Planning Note    Patient name Bea Perez  Location /-01 MRN [id number]  : 1950 Date 2025       Current Admission Date: 2025  Current Admission Diagnosis:Multifocal pneumonia   Patient Active Problem List    Diagnosis Date Noted Date Diagnosed    Anxiety 2025     Elevated troponin 2025     Takotsubo cardiomyopathy 2025     Flash pulmonary edema (HCC) 2025     Multifocal pneumonia 2025     Acute hypoxic respiratory failure (HCC) 2025     Sepsis with acute hypoxic respiratory failure (HCC) 2025     Hypertension, essential 07/15/2024     Bronchitis 2022     Hypercholesterolemia 2020     Current moderate episode of major depressive disorder without prior episode (HCC) 2019     Gastro-esophageal reflux disease with esophagitis 2019     Iron deficiency anemia secondary to inadequate dietary iron intake 2019     Generalized anxiety disorder 2019     Seasonal allergic rhinitis due to pollen 2019     Pharyngoesophageal dysphagia 2019       LOS (days): 6  Geometric Mean LOS (GMLOS) (days): 4.9  Days to GMLOS:-0.8     OBJECTIVE:  Risk of Unplanned Readmission Score: 24.14         Current admission status: Inpatient   Preferred Pharmacy:   [place] [place] - [address] [place] [address]  Phone: [phone] Fax: [phone]    [facility]pharmacy #[id number] [place] [place] - [address] [place] [address]  Phone: [phone] Fax: [phone]    Primary Care Provider: Joyce Mcpherson MD    Primary Insurance: MEDICARE  Secondary Insurance:  FOR LIFE    DISCHARGE DETAILS:    Followed up on dc recommendations.  Level 2 rehab    Discharge planning discussed with:: patient and spouse at the bedside  Freedom of Choice: Yes  Comments - Freedom of Choice: Discussed Level 2 rehab recommendations.  Spouse who was a [employer] in the past and understands rehab with his wife would like to go home with HHC. He is going to see if he can find out who he had in the past. [facility] referral was made for HHC-  CM contacted family/caregiver?: Yes  Were Treatment Team discharge recommendations reviewed with patient/caregiver?: Yes  Did patient/caregiver verbalize understanding of patient care needs?: Yes       Contacts  Patient Contacts: , marisa  Relationship to Patient:: Family  Contact Method: In Person  Reason/Outcome: Discharge Planning    Requested Home Health Care         Is the patient interested in HHC at discharge?: Yes  Home Health Discipline requested:: Nursing, Physical Therapy  HHA External Referral Reason (only applicable if external HHA name selected): Services not provided in network or near patient location  Home Health Follow-Up Provider:: PCP  Home Health Services Needed:: Oxygen Via Nasal Cannula, Evaluate Functional Status and Safety, Strengthening/Theraputic Exercises to Improve Function  Homebound Criteria Met:: Uses an Assist Device (i.e. cane, walker, etc)  Supporting Clincal Findings:: Fatigues Easliy in Short Distances, Limited Endurance         Other Referral/Resources/Interventions Provided:  Interventions: HHC  Referral Comments: [facility] referral for HHC was made will follow up with availability  Pt may need home 02 as well.    Freedom of choice was provided in regards to needing a home medical equipment company, pt does not have preference. Pt is aware that we frequently utilized [employer] as we have a working relationship with this company. Patients choice is to use [employer]..           Treatment Team Recommendation: Short Term Rehab  Discharge Destination Plan:: Home with Home Health Care  Transport at Discharge : Family

## 2025-02-17 NOTE — ASSESSMENT & PLAN NOTE
CT chest shows multifocal pneumonia with dense consolidation in both lower lobes  Urine strep and Legionella negative  Continue Mucinex and breathing treatments   Initially on Solu-Medrol 40 mg IV q hours, has been weaned and stopped   Completed 3 Days of Azithromycin, Currently on Ceftriaxone - antibiotic coverage to end 2/18

## 2025-02-17 NOTE — ASSESSMENT & PLAN NOTE
2/12 had episodes of acute respiratory failure with increased vascular congestion on chest x-ray. Patient received fluids secondary to sepsis  IV lasix initiated  Cardiology following- see plan under takotsubo cardiomyopathy   Has resolved

## 2025-02-17 NOTE — RESPIRATORY THERAPY NOTE
RT Protocol Note  Bea Perez 74 y.o. female MRN: [id number]  Unit/Bed#: -01 Encounter: [id number]    Assessment    Principal Problem:    Multifocal pneumonia  Active Problems:    Current moderate episode of major depressive disorder without prior episode (HCC)    Bronchitis    Acute hypoxic respiratory failure (HCC)    Sepsis with acute hypoxic respiratory failure (HCC)    Flash pulmonary edema (HCC)    Anxiety    Elevated troponin    Takotsubo cardiomyopathy      Home Pulmonary Medications:         Past Medical History:   Diagnosis Date    Depression     Esophageal stricture     History of gastroesophageal reflux (GERD)     Iron deficiency anemia     Meniere disease      Social History     Socioeconomic History    Marital status: /Civil Union     Spouse name: None    Number of children: None    Years of education: None    Highest education level: None   Occupational History    None   Tobacco Use    Smoking status: Some Days     Types: Cigarettes    Smokeless tobacco: Never   Vaping Use    Vaping status: Never Used   Substance and Sexual Activity    Alcohol use: Not Currently     Comment: Denies alcohol use - As per [employer]     Drug use: Not Currently    Sexual activity: None   Other Topics Concern    None   Social History Narrative    None     Social Drivers of Health     Financial Resource Strain: Low Risk  (12/17/2024)    Received from [facility]    Overall Financial Resource Strain (CARDIA)     Difficulty of Paying Living Expenses: Not hard at all   Food Insecurity: No Food Insecurity (2/11/2025)    Nursing - Inadequate Food Risk Classification     Worried About Running Out of Food in the Last Year: Not on file     Ran Out of Food in the Last Year: Not on file     Ran Out of Food in the Last Year: Never true   Transportation Needs: No Transportation Needs (2/11/2025)    Nursing - Transportation Risk Classification     Lack of Transportation: Not on file     Lack of  "Transportation: No   Physical Activity: Not on file   Stress: No Stress Concern Present (2024)    Received from [facility]    [employer] - Occupational Stress Questionnaire     Feeling of Stress : Only a little   Social Connections: Unknown (2024)    Received from [facility]    Social Connections     How often do you feel lonely or isolated from those around you? (Adult - for ages 18 years and over): Not on file   Intimate Partner Violence: Unknown (2025)    Nursing IPS     Feels Physically and Emotionally Safe: Not on file     Physically Hurt by Someone: Not on file     Humiliated or Emotionally Abused by Someone: Not on file     Physically Hurt by Someone: No     Hurt or Threatened by Someone: No   Housing Stability: Unknown (2025)    Nursing: Inadequate Housing Risk Classification     Has Housing: Not on file     Worried About Losing Housing: Not on file     Unable to Get Utilities: Not on file     Unable to Pay for Housing in the Last Year: No     Has Housin       Subjective         Objective    Physical Exam:   Assessment Type: During-treatment  General Appearance: Awake, Alert  Respiratory Pattern: Dyspnea with exertion  Chest Assessment: Chest expansion symmetrical  Cough: Productive    Vitals:  Blood pressure 111/61, pulse 74, temperature 97.7 °F (36.5 °C), resp. rate 18, height 5' 2\" (1.575 m), weight 60.9 kg (134 lb 4.2 oz), SpO2 94%.    Results from last 7 days   Lab Units 25  0953   PH ART  7.430   PCO2 ART mm Hg 44.0   PO2 ART mm Hg 66.8*   HCO3 ART mmol/L 28.5*   BASE EXC ART mmol/L 3.7   O2 CONTENT ART mL/dL 17.4   O2 HGB, ARTERIAL % 92.9*   ABG SOURCE  Radial, Right   SMOOTH TEST  Yes       Imaging and other studies: Results Review Statement: No pertinent imaging studies reviewed.    O2 Device: 4lpm     Plan    Respiratory Plan: Mild Distress pathway        Resp Comments: Pt continues on 5lpm NC, productive cough, scheduled " neb tx provided.

## 2025-02-17 NOTE — ASSESSMENT & PLAN NOTE
Patient went into acute hypoxic respiratory failure was a rapid response on 2/12 secondary to pulmonary edema.   Was given IV Lasix and started on BiPAP   Echocardiogram on 2/13 revealed LVEF 15%, akinetic apical myocardium consistent with Takotsubo cardiomyopathy   Repeat with improved EF see below   Transitioned to PO lasix on 2/15/25- continue 40 mg daily   Currently on 5L nasal cannula -> weaned to 4L today    Wean as tolerated   Will likely require home O2 study upon discharge

## 2025-02-17 NOTE — PLAN OF CARE
Problem: PHYSICAL THERAPY ADULT  Goal: Performs mobility at highest level of function for planned discharge setting.  See evaluation for individualized goals.  Description: Treatment/Interventions: ADL retraining, Functional transfer training, LE strengthening/ROM, Therapeutic exercise, Endurance training, Patient/family training, Equipment eval/education, Bed mobility, Gait training, Compensatory technique education, Spoke to nursing, OT          See flowsheet documentation for full assessment, interventions and recommendations.  2/17/2025 1410 by Jasmine Salvador, PT  Outcome: Progressing  Note: Prognosis: Excellent  Problem List: Decreased strength, Decreased endurance, Impaired balance, Decreased mobility, Pain    Barriers to Discharge: Other (Comment)  Barriers to Discharge Comments: Decreased endurance/funcitonal activity tolerance, increased need for supplemental O2 from baseline, decreased mobility, increased caregiver A  Rehab Resource Intensity Level, PT: II (Moderate Resource Intensity)  Patient seen for additional treatment following evaluation with interventions to include gt training. Pt progress noted with ambulation an additional 30' w/o use of AD and min A x 1. Pt on 5 LPM of supplemental O2 t/o session via NC with SPo2 89-92%. Mild PERKINS noted. VC's provided for proper breathing technique. Pt tolerated additional treatment well. Pt would benefit from level II resources, PAR vs HHPT, pending progress during hospitalization.   See flowsheet documentation for full assessment.

## 2025-02-17 NOTE — PHYSICAL THERAPY NOTE
PHYSICAL THERAPY EVALUATION  NAME:  Bea Perez  DATE: 02/17/25    AGE:   74 y.o.  Mrn:   [id number]  ADMIT DX:  Pneumonia [J18.9]  SOB (shortness of breath) [R06.02]  Hypoxia [R09.02]  Sepsis (HCC) [A41.9]    Past Medical History:   Diagnosis Date    Depression     Esophageal stricture     History of gastroesophageal reflux (GERD)     Iron deficiency anemia     Meniere disease      Length Of Stay: 6  Performed at least 2 patient identifiers during session: Name and Birthday  PHYSICAL THERAPY EVALUATION :    02/17/25 1037   PT Last Visit   PT Visit Date 02/17/25   Note Type   Note type Evaluation   Pain Assessment   Pain Assessment Tool 0-10   Restrictions/Precautions   Weight Bearing Precautions Per Order No   Other Precautions Chair Alarm;Bed Alarm;Multiple lines;O2;Fall Risk   Home Living   Type of Home House  (1 EJ)   Home Layout One level;Able to live on main level with bedroom/bathroom;Performs ADLs on one level;Stairs to enter without rails   Bathroom Shower/Tub Walk-in shower   Bathroom Toilet Standard   Bathroom Equipment Grab bars in shower;Shower chair;Commode;Grab bars around toilet   Bathroom Accessibility Accessible via walker   Home Equipment Walker;Cane;Wheelchair-manual;Grab bars;Sock aid   Additional Comments Patient lives in a one  with one EJ ( no HR).  FF to the basement which patient does not access.  Pt did not use any AD at baseline.   Prior Function   Level of [place] Independent with ADLs;Independent with functional mobility;Independent with IADLS   Lives With Spouse   Receives Help From Family   IADLs Family/Friend/Other provides medication management;Family/Friend/Other provides meals;Family/Friend/Other provides transportation   Falls in the last 6 months 0   General   Family/Caregiver Present Yes   Cognition   Overall Cognitive Status WFL   Arousal/Participation Alert   Orientation Level Oriented X4   Following Commands Follows one step commands without difficulty  "  Subjective   Subjective \"I would like to drive again\"   RLE Assessment   RLE Assessment WFL  (Grosslt 3+/5)   LLE Assessment   LLE Assessment WFL  (Grossly 3+/5)   Bed Mobility   Supine to Sit 5  Supervision   Additional items Increased time required;Bedrails   Additional Comments Patient completed supine > sit with S, increased time and VC's   Transfers   Sit to Stand 4  Minimal assistance   Additional items Assist x 1;Increased time required;Verbal cues   Stand to Sit 4  Minimal assistance   Additional items Assist x 1;Increased time required;Verbal cues   Stand pivot 4  Minimal assistance   Additional items Assist x 1;Increased time required;Verbal cues   Additional Comments Patient completed all functional transfers STS as well as SPT with min A x 1 with increased time and VC's. Patient initally provided with Rw for UB support but progressed to no AD t/o treatment.   Ambulation/Elevation   Gait pattern Improper Weight shift;Wide JORY;Decreased foot clearance;Short stride;Decreased hip extension;Decreased heel strike  (Decreased arm swing)   Gait Assistance 4  Minimal assist   Additional items Assist x 1   Assistive Device Rolling walker   Distance Patient ambulated 30' with Rw and min A x 1 with VC's for AD management and directional changes.  Pt progressed to additional ambulation without AD during treatment session.  See tx note for details   Balance   Static Sitting Normal   Dynamic Sitting Good   Static Standing Fair +   Dynamic Standing Fair   Ambulatory Fair   Endurance Deficit   Endurance Deficit Yes   Endurance Deficit Description Patient on 6 LPM O2 via NC during session.  SPO2 ranged from 89-92%.  Vc's for proper breathing post gait.   Activity Tolerance   Activity Tolerance Patient limited by fatigue   Medical Staff Made Aware Spoke with OT Zana   Nurse Made Aware Spoke with RN Ha   Assessment   Prognosis Excellent   Problem List Decreased strength;Decreased endurance;Impaired balance;Decreased " mobility;Pain   Barriers to Discharge Other (Comment)   Barriers to Discharge Comments Decreased endurance/funcitonal activity tolerance, increased need for supplemental O2 from baseline, decreased mobility, increased caregiver A   Goals   STG Expiration Date 03/03/25   Plan   Treatment/Interventions ADL retraining;Functional transfer training;LE strengthening/ROM;Therapeutic exercise;Endurance training;Patient/family training;Equipment eval/education;Bed mobility;Gait training;Compensatory technique education;Spoke to nursing;OT   PT Frequency 3-5x/wk   Discharge Recommendation   Rehab Resource Intensity Level, PT II (Moderate Resource Intensity)   Additional Comments Patient would benefit from level II resources, PAR vs HHPT pending progress/medical status.   AM-PAC Basic Mobility Inpatient   Turning in Flat Bed Without Bedrails 3   Lying on Back to Sitting on Edge of Flat Bed Without Bedrails 3   Moving Bed to Chair 3   Standing Up From Chair Using Arms 3   Walk in Room 3   Climb 3-5 Stairs With Railing 2   Basic Mobility Inpatient Raw Score 17   Basic Mobility Standardized Score 39.67   [facility] Highest Level Of Mobility   -HLM Goal 5: Stand one or more mins   -HL Achieved 7: Walk 25 feet or more   Additional Treatment Session   Start Time 1059   End Time 1108   Treatment Assessment Patient seen for additional treatment following evaluation with interventions to include gt training.  Pt progress noted with ambulation an additional 30' w/o use of AD and min A x 1.  Pt on 5 LPM of supplemental O2 t/o session via NC with SPo2 89-92%. Mild PERKINS noted. VC's provided for proper breathing technique.  Pt  tolerated additional treatment well.  Pt would benefit from level II resources, PAR vs HHPT, pending progress during hospitalization.   Additional Treatment Day 1   End of Consult   Patient Position at End of Consult Bedside chair;Bed/Chair alarm activated;All needs within reach     (Please find full objective  findings from PT assessment regarding body systems outlined above).     Assessment: Pt is a 74 y.o. female seen for PT evaluation s/p admission to [facility] on 2/11/2025 with Multifocal pneumonia.  Order placed for PT services.  Upon evaluation: Pt is presenting with impaired functional mobility due to pain, decreased strength, decreased endurance, impaired balance, gait deviations, decreased safety awareness, fall risk, and LE edema requiring  S assistance for bed mobility, min assistance for transfers, and min assistance for ambulation with LRAD . Pt's clinical presentation is currently unstable/unpredictable given the functional mobility deficits above, especially weakness, edema of extremities, decreased endurance, impaired balance, gait deviations, pain, decreased activity tolerance, decreased functional mobility tolerance, decreased safety awareness, and SOB upon exertion, coupled with fall risks as indicated by AM-PAC 6-Clicks: 19/24 as well as impaired balance, polypharmacy, and decreased safety awareness and combined with medical complications of tachycardia, pain impacting overall mobility status, abnormal renal lab values, abnormal H&H, abnormal WBCs, abnormal CBC, abnormal blood sugars, low SpO2 values, new onset O2 use, increased O2 needs from baseline, abnormal CO2 values, fear/retreat, need for input for mobility technique/safety, and pneumonia, acute hypoxic RF, sepsis with RF, bronchitis, current episode of major depressive diorder .  Pt's PMHx and comorbidities that may affect physical performance and progress include: depression and esophageal stricture, GERD, anemia, Meniere's . Personal factors affecting pt at time of IE include: depression, multi-level environment, inability to perform IADLs, inability to perform ADLs, inability to navigate level surfaces without external assistance, inability to ambulate household distances, and inability to navigate community distances. Pt  will benefit from continued skilled PT services to address deficits as defined above and to maximize level of functional mobility to facilitate return toward PLOF and improved QOL. From PT/mobility standpoint, recommendation at time of d/c would be Level II (Moderate Resource Intensity pending progress in order to reduce fall risk and maximize pt's functional independence and consistency with mobility in order to facilitate return to PLOF.  Recommend ther ex next 1-2 sessions.     The patient's AM-PAC Basic Mobility Inpatient Short Form Raw Score is 17. A Raw score of greater than 16 suggests the patient may benefit from discharge to home. Despite AMPAC of 17, patient would benefit from level II resources upon discharge as she is functioning below her baseline at this time.  Please also refer to the recommendation of the Physical Therapist for safe discharge planning.       Goals: Pt will: Perform bed mobility tasks with modified I to reposition in bed and prepare for transfers. Pt will perform transfers with modified I to increase Indep in prior living environment, improve ease of transfers, improve ease of bed mobility, and improve activity tolerance and prepare for ambulation. Pt will ambulate with LRAD for >/= 300' with  modified I  to increase Indep in prior living environment, decrease risk for falls, and improve activity tolerance and to access home environment. Pt will complete >/= 1 step with without handrail with Supervision to return to home with EJ. Pt will participate in objective balance assessment to determine baseline fall risk. Pt will increase B LE strength >/= 1/2 MMT grade to facilitate functional mobility.     Jasmine Salvador, PT,MSPT

## 2025-02-17 NOTE — ASSESSMENT & PLAN NOTE
No formal diagnosis of COPD, the patient smoked on and off for 30 years.   IV Rocephin  and Zithromax  Breathing treatments  IV solumedrol initially q 8 hours - has been weaned to stop

## 2025-02-17 NOTE — PLAN OF CARE
Problem: OCCUPATIONAL THERAPY ADULT  Goal: Performs self-care activities at highest level of function for planned discharge setting.  See evaluation for individualized goals.  Description: Treatment Interventions: ADL retraining, Functional transfer training, UE strengthening/ROM, Endurance training, Patient/family training, Compensatory technique education, Energy conservation, Activityengagement, Cardiac education          See flowsheet documentation for full assessment, interventions and recommendations.   Note: Limitation: Decreased ADL status, Decreased UE strength, Decreased endurance, Decreased self-care trans, Decreased high-level ADLs  Prognosis: Good  Assessment: Pt is a 74 y.o. female, admitted to [facility] 2/11/2025 d/t experiencing worsening shortness of breath and a cough, with spO2 at 86% on room air at urgent care. Dx: multifocal pneumonia. Pt with PMHx impacting their performance during ADL tasks, including: depression, esophageal stricture, GERD, Meniere disease. Prior to admission to the hospital Pt was performing ADLs without physical assistance. IADLs without physical assistance. Functional transfers/ambulation without physical assistance. Cognitive status was PTA was WFL. OT order placed to assess Pt's ADLs, cognitive status, and performance during functional tasks in order to maximize safety and independence while making most appropriate d/c recommendations. PT/OT co-evaluation completed at this time d/t significant mobility deficits and safety concerns. Pt's clinical presentation is currently unstable/unpredictable given new onset deficits that effect Pt's occupational performance and ability to safely return to PLOF including decrease activity tolerance, decrease standing balance, decrease sitting balance, decrease performance during ADL tasks, increased pain, decrease generalized strength, decrease activity engagement, and decrease performance during functional transfers combined with medical  complications of new onset O2 use and need for input for mobility technique/safety. Personal factors affecting Pt at time of initial evaluation include: inability to perform IADLs, inability to perform ADLs, inability to ambulate household distances, inability to navigate community distances, and decreased initiation and engagement. Pt will benefit from continued skilled OT services to address deficits as defined above and to maximize level independence/participation during ADLs and functional tasks to facilitate return toward PLOF and improved quality of life. From an occupational therapy standpoint, recommendation at time of d/c would be Level II: Moderate Resource Intensity.     Rehab Resource Intensity Level, OT: II (Moderate Resource Intensity)

## 2025-02-18 PROBLEM — J81.0 FLASH PULMONARY EDEMA (HCC): Status: RESOLVED | Noted: 2025-02-12 | Resolved: 2025-02-18

## 2025-02-18 LAB
ANION GAP SERPL CALCULATED.3IONS-SCNC: 3 MMOL/L (ref 4–13)
BUN SERPL-MCNC: 26 MG/DL (ref 5–25)
CALCIUM SERPL-MCNC: 8.1 MG/DL (ref 8.4–10.2)
CHLORIDE SERPL-SCNC: 103 MMOL/L (ref 96–108)
CO2 SERPL-SCNC: 36 MMOL/L (ref 21–32)
CREAT SERPL-MCNC: 0.62 MG/DL (ref 0.6–1.3)
ERYTHROCYTE [DISTWIDTH] IN BLOOD BY AUTOMATED COUNT: 16.1 % (ref 11.6–15.1)
GFR SERPL CREATININE-BSD FRML MDRD: 89 ML/MIN/1.73SQ M
GLUCOSE SERPL-MCNC: 85 MG/DL (ref 65–140)
HCT VFR BLD AUTO: 33.3 % (ref 34.8–46.1)
HGB BLD-MCNC: 10.8 G/DL (ref 11.5–15.4)
MAGNESIUM SERPL-MCNC: 2.1 MG/DL (ref 1.9–2.7)
MCH RBC QN AUTO: 29.8 PG (ref 26.8–34.3)
MCHC RBC AUTO-ENTMCNC: 32.4 G/DL (ref 31.4–37.4)
MCV RBC AUTO: 92 FL (ref 82–98)
PLATELET # BLD AUTO: 542 THOUSANDS/UL (ref 149–390)
PMV BLD AUTO: 9.4 FL (ref 8.9–12.7)
POTASSIUM SERPL-SCNC: 4 MMOL/L (ref 3.5–5.3)
RBC # BLD AUTO: 3.62 MILLION/UL (ref 3.81–5.12)
SODIUM SERPL-SCNC: 142 MMOL/L (ref 135–147)
WBC # BLD AUTO: 11.16 THOUSAND/UL (ref 4.31–10.16)

## 2025-02-18 PROCEDURE — 83735 ASSAY OF MAGNESIUM: CPT

## 2025-02-18 PROCEDURE — 85027 COMPLETE CBC AUTOMATED: CPT

## 2025-02-18 PROCEDURE — 80048 BASIC METABOLIC PNL TOTAL CA: CPT

## 2025-02-18 PROCEDURE — 94640 AIRWAY INHALATION TREATMENT: CPT

## 2025-02-18 PROCEDURE — 99232 SBSQ HOSP IP/OBS MODERATE 35: CPT

## 2025-02-18 PROCEDURE — 94760 N-INVAS EAR/PLS OXIMETRY 1: CPT

## 2025-02-18 RX ADMIN — IPRATROPIUM BROMIDE 0.5 MG: 0.5 SOLUTION RESPIRATORY (INHALATION) at 14:16

## 2025-02-18 RX ADMIN — BUDESONIDE INHALATION 0.5 MG: 0.5 SUSPENSION RESPIRATORY (INHALATION) at 20:24

## 2025-02-18 RX ADMIN — FUROSEMIDE 40 MG: 40 TABLET ORAL at 09:20

## 2025-02-18 RX ADMIN — GUAIFENESIN 600 MG: 600 TABLET ORAL at 09:20

## 2025-02-18 RX ADMIN — CARVEDILOL 3.12 MG: 3.12 TABLET, FILM COATED ORAL at 09:21

## 2025-02-18 RX ADMIN — OLANZAPINE 7.5 MG: 2.5 TABLET, FILM COATED ORAL at 21:39

## 2025-02-18 RX ADMIN — LIDOCAINE 5% 1 PATCH: 700 PATCH TOPICAL at 09:20

## 2025-02-18 RX ADMIN — EMPAGLIFLOZIN 10 MG: 10 TABLET, FILM COATED ORAL at 09:21

## 2025-02-18 RX ADMIN — LEVALBUTEROL HYDROCHLORIDE 1.25 MG: 1.25 SOLUTION RESPIRATORY (INHALATION) at 14:16

## 2025-02-18 RX ADMIN — CEFTRIAXONE 1000 MG: 1 INJECTION, SOLUTION INTRAVENOUS at 18:04

## 2025-02-18 RX ADMIN — ENOXAPARIN SODIUM 40 MG: 40 INJECTION SUBCUTANEOUS at 09:20

## 2025-02-18 RX ADMIN — DIVALPROEX SODIUM 250 MG: 125 CAPSULE, COATED PELLETS ORAL at 09:20

## 2025-02-18 RX ADMIN — BUDESONIDE INHALATION 0.5 MG: 0.5 SUSPENSION RESPIRATORY (INHALATION) at 08:49

## 2025-02-18 RX ADMIN — LEVALBUTEROL HYDROCHLORIDE 1.25 MG: 1.25 SOLUTION RESPIRATORY (INHALATION) at 08:49

## 2025-02-18 RX ADMIN — GUAIFENESIN 600 MG: 600 TABLET ORAL at 18:04

## 2025-02-18 RX ADMIN — LORAZEPAM 0.5 MG: 0.5 TABLET ORAL at 21:39

## 2025-02-18 RX ADMIN — DULOXETINE HYDROCHLORIDE 30 MG: 30 CAPSULE, DELAYED RELEASE ORAL at 09:20

## 2025-02-18 RX ADMIN — CARVEDILOL 3.12 MG: 3.12 TABLET, FILM COATED ORAL at 18:04

## 2025-02-18 RX ADMIN — IPRATROPIUM BROMIDE 0.5 MG: 0.5 SOLUTION RESPIRATORY (INHALATION) at 20:24

## 2025-02-18 RX ADMIN — LEVALBUTEROL HYDROCHLORIDE 1.25 MG: 1.25 SOLUTION RESPIRATORY (INHALATION) at 20:24

## 2025-02-18 RX ADMIN — DIVALPROEX SODIUM 250 MG: 125 CAPSULE, COATED PELLETS ORAL at 18:04

## 2025-02-18 RX ADMIN — IPRATROPIUM BROMIDE 0.5 MG: 0.5 SOLUTION RESPIRATORY (INHALATION) at 08:49

## 2025-02-18 NOTE — RESPIRATORY THERAPY NOTE
02/18/25 0736   Inhalation Therapy Tx   Resp Comments Pt on 3 liters nc. Flutter valve within reach @ bedside.

## 2025-02-18 NOTE — ASSESSMENT & PLAN NOTE
2/12 had episodes of acute respiratory failure with increased vascular congestion on chest x-ray. Patient received fluids secondary to sepsis  IV lasix initiated  Cardiology was following- see plan under takotsubo cardiomyopathy   Has resolved

## 2025-02-18 NOTE — PROGRESS NOTES
Progress Note - Hospitalist   Name: Bea Perez 74 y.o. female I MRN: [id number]  Unit/Bed#: -01 I Date of Admission: 2/11/2025   Date of Service: 2/18/2025 I Hospital Day: 7    Assessment & Plan  Multifocal pneumonia  CT chest shows multifocal pneumonia with dense consolidation in both lower lobes  Urine strep and Legionella negative  Continue Mucinex and breathing treatments   Initially on Solu-Medrol 40 mg IV q hours, has been weaned and stopped   Completed 3 Days of Azithromycin, Currently on Ceftriaxone - antibiotic coverage to end 2/18     Acute hypoxic respiratory failure (HCC)  Patient went into acute hypoxic respiratory failure was a rapid response on 2/12 secondary to pulmonary edema.   Was given IV Lasix and started on BiPAP   Echocardiogram on 2/13 revealed LVEF 15%, akinetic apical myocardium consistent with Takotsubo cardiomyopathy   Repeat with improved EF see below   Transitioned to PO lasix on 2/15/25- continue 40 mg daily   Continue to wean oxygen as tolerated-weaned to 3 L nasal cannula today  Wean as tolerated   Will likely require home O2 study upon discharge -in next 24 to 48 hours  Takotsubo cardiomyopathy  A/e/b echo 2/13 EF 15%, G1DD, akinetic apical myocardium   In the setting of anxiety, depression, home stressors   Fluid overloaded on 2/12 requiring BiPAP and Lasix  Cardiology on consult   GDMT - Lasix 40 mg daily, Carvedilol 3.125mg daily, lisinopril 2.5 mg daily, Jardiance daily   Target euvolemic and net balance even to slightly negative   Repeat echocardiogram on 2/17  LVEF 45% - will not order LifeVest as OP with improving EF  Sepsis with acute hypoxic respiratory failure (HCC)  POA with tachycardia, leukocytosis secondary to multifocal pneumonia.  Blood cultures negative, procalcitonin downtrending   See further plan under multifocal pneumona  Bronchitis  No formal diagnosis of COPD, the patient smoked on and off for 30 years.   IV Rocephin  and Zithromax  Breathing  treatments  IV solumedrol initially q 8 hours - has been weaned to stop    Current moderate episode of major depressive disorder without prior episode (HCC)  Continue with Depakote, Zyprexa and Cymbalta.  Flash pulmonary edema (HCC) (Resolved: 2/18/2025)  2/12 had episodes of acute respiratory failure with increased vascular congestion on chest x-ray. Patient received fluids secondary to sepsis  IV lasix initiated  Cardiology was following- see plan under takotsubo cardiomyopathy   Has resolved   Anxiety  Patient has Ativan 0.5 mg PO as needed for anxiety   Elevated troponin  Secondary to demand ischemia   Cardiology following   Denies any current chest pain     VTE Pharmacologic Prophylaxis: VTE Score: 5 High Risk (Score >/= 5) - Pharmacological DVT Prophylaxis Ordered: enoxaparin (Lovenox). Sequential Compression Devices Ordered.    Mobility:   Basic Mobility Inpatient Raw Score: 23  JH-HLM Goal: 7: Walk 25 feet or more  JH-HLM Achieved: 8: Walk 250 feet ot more  JH-HLM Goal achieved. Continue to encourage appropriate mobility.    Patient Centered Rounds: I performed bedside rounds with nursing staff today.   Discussions with Specialists or Other Care Team Provider: CM    Education and Discussions with Family / Patient: Attempted to update  () via phone. Left voicemail.     Current Length of Stay: 7 day(s)  Current Patient Status: Inpatient   Certification Statement: The patient will continue to require additional inpatient hospital stay due to hypoxia and oxygen need, pneumonia  Discharge Plan: Anticipate discharge in 24-48 hrs to home with home services.    Code Status: Level 1 - Full Code    Subjective   Seen and examined.  Patient endorsing no shortness of breath but is having productive cough.  She feels that the mucus is starting to loosen.  Has been compliant with flutter valve and incentive spirometry.     Objective :  Temp:  [97.7 °F (36.5 °C)-98.9 °F (37.2 °C)] 98.9 °F (37.2 °C)  HR:   [70-90] 80  BP: ()/(49-58) 103/58  Resp:  [22-28] 22  SpO2:  [90 %-94 %] 94 %  O2 Device: Nasal cannula  Nasal Cannula O2 Flow Rate (L/min):  [3 L/min-5 L/min] 3 L/min    Body mass index is 24.51 kg/m².     Input and Output Summary (last 24 hours):     Intake/Output Summary (Last 24 hours) at 2/18/2025 1105  Last data filed at 2/18/2025 1001  Gross per 24 hour   Intake 560 ml   Output --   Net 560 ml       Physical Exam  Vitals and nursing note reviewed.   Constitutional:       General: She is not in acute distress.     Appearance: Normal appearance. She is not ill-appearing.   HENT:      Head: Normocephalic and atraumatic.      Nose: No congestion.      Mouth/Throat:      Mouth: Mucous membranes are moist.      Pharynx: Oropharynx is clear.   Eyes:      Conjunctiva/sclera: Conjunctivae normal.   Cardiovascular:      Rate and Rhythm: Normal rate and regular rhythm.      Pulses: Normal pulses.      Heart sounds: Normal heart sounds. No murmur heard.  Pulmonary:      Effort: Pulmonary effort is normal. No respiratory distress.      Breath sounds: Wheezing (Faint expiratory wheeze) present.      Comments: Very diminished breath sounds bilaterally, poor air movement  Abdominal:      General: Bowel sounds are normal.      Palpations: Abdomen is soft.      Tenderness: There is no abdominal tenderness.   Musculoskeletal:         General: Normal range of motion.      Cervical back: Normal range of motion.      Right lower leg: No edema.      Left lower leg: No edema.   Skin:     General: Skin is warm and dry.   Neurological:      Mental Status: She is alert and oriented to person, place, and time.   Psychiatric:         Mood and Affect: Mood normal.         Behavior: Behavior normal.           Lines/Drains:        Telemetry:  Telemetry Orders (From admission, onward)               LifeVest Patient: Continuous Telemetry Monitoring during hospitalization (non-expiring)  Continuous LifeVest Telemetry Monitoring         References:    LifeVest Policy                                Lab Results: I have reviewed the following results:   Results from last 7 days   Lab Units 02/18/25 0454 02/14/25  0611 02/13/25 0449 02/12/25 1931   WBC Thousand/uL 11.16*   < > 24.18* 29.16*   HEMOGLOBIN g/dL 10.8*   < > 9.3* 10.8*   HEMATOCRIT % 33.3*   < > 28.7* 32.9*   PLATELETS Thousands/uL 542*   < > 297 320   BANDS PCT %  --   --  13*  --    SEGS PCT %  --   --   --  93*   LYMPHO PCT %  --   --  4* 1*   MONO PCT %  --   --  4 5   EOS PCT %  --   --  0 0    < > = values in this interval not displayed.     Results from last 7 days   Lab Units 02/18/25 0454 02/13/25 0449 02/12/25 1931   SODIUM mmol/L 142   < > 140   POTASSIUM mmol/L 4.0   < > 4.3   CHLORIDE mmol/L 103   < > 106   CO2 mmol/L 36*   < > 27   BUN mg/dL 26*   < > 18   CREATININE mg/dL 0.62   < > 0.80   ANION GAP mmol/L 3*   < > 7   CALCIUM mg/dL 8.1*   < > 8.6   ALBUMIN g/dL  --   --  3.1*   TOTAL BILIRUBIN mg/dL  --   --  0.31   ALK PHOS U/L  --   --  86   ALT U/L  --   --  25   AST U/L  --   --  44*   GLUCOSE RANDOM mg/dL 85   < > 198*    < > = values in this interval not displayed.         Results from last 7 days   Lab Units 02/12/25 1835   POC GLUCOSE mg/dl 203*         Results from last 7 days   Lab Units 02/12/25 2243 02/12/25 1931 02/12/25 0551 02/11/25 2045 02/11/25  1823   LACTIC ACID mmol/L 1.2 2.2*  --  1.8  --    PROCALCITONIN ng/ml  --   --  0.52*  --  1.01*       Recent Cultures (last 7 days):   Results from last 7 days   Lab Units 02/12/25  0551 02/11/25 1834 02/11/25  1823   BLOOD CULTURE   --  No Growth After 5 Days. No Growth After 5 Days.   LEGIONELLA URINARY ANTIGEN  Negative  --   --              Last 24 Hours Medication List:     Current Facility-Administered Medications:     acetaminophen (TYLENOL) tablet 650 mg, Q6H PRN    budesonide (PULMICORT) inhalation solution 0.5 mg, Q12H    carvedilol (COREG) tablet 3.125 mg, BID With Meals    cefTRIAXone  (ROCEPHIN) IVPB (premix in dextrose) 1,000 mg 50 mL, Q24H, Last Rate: Stopped (02/17/25 2046)    divalproex sodium (DEPAKOTE SPRINKLE) capsule 250 mg, BID    DULoxetine (CYMBALTA) delayed release capsule 30 mg, Daily    Empagliflozin (JARDIANCE) tablet 10 mg, Daily    enoxaparin (LOVENOX) subcutaneous injection 40 mg, Daily    furosemide (LASIX) tablet 40 mg, Daily    guaiFENesin (MUCINEX) 12 hr tablet 600 mg, BID    ipratropium (ATROVENT) 0.02 % inhalation solution 0.5 mg, TID    levalbuterol (XOPENEX) inhalation solution 1.25 mg, TID **AND** [DISCONTINUED] sodium chloride 0.9 % inhalation solution 3 mL, TID    lidocaine (LIDODERM) 5 % patch 1 patch, Daily    lisinopril (ZESTRIL) tablet 2.5 mg, Daily    LORazepam (ATIVAN) tablet 0.5 mg, Q6H PRN    OLANZapine (ZyPREXA) tablet 7.5 mg, HS    perflutren lipid microsphere (DEFINITY) injection, Once in imaging    Administrative Statements   Today, Patient Was Seen By: Suzi Bermudez PA-C      **Please Note: This note may have been constructed using a voice recognition system.**

## 2025-02-18 NOTE — PLAN OF CARE
Problem: Potential for Falls  Goal: Patient will remain free of falls  Description: INTERVENTIONS:  - Educate patient/family on patient safety including physical limitations  - Instruct patient to call for assistance with activity   - Consult OT/PT to assist with strengthening/mobility   - Keep Call bell within reach  - Keep bed low and locked with side rails adjusted as appropriate  - Keep care items and personal belongings within reach  - Initiate and maintain comfort rounds  - Make Fall Risk Sign visible to staff  - Offer Toileting every 2 Hours, in advance of need  - Initiate/Maintain bed/chair alarm  - Obtain necessary fall risk management equipment: na  - Apply yellow socks and bracelet for high fall risk patients  - Consider moving patient to room near nurses station  Outcome: Progressing     Problem: PAIN - ADULT  Goal: Verbalizes/displays adequate comfort level or baseline comfort level  Description: Interventions:  - Encourage patient to monitor pain and request assistance  - Assess pain using appropriate pain scale  - Administer analgesics based on type and severity of pain and evaluate response  - Implement non-pharmacological measures as appropriate and evaluate response  - Consider cultural and social influences on pain and pain management  - Notify physician/advanced practitioner if interventions unsuccessful or patient reports new pain  Outcome: Progressing     Problem: INFECTION - ADULT  Goal: Absence or prevention of progression during hospitalization  Description: INTERVENTIONS:  - Assess and monitor for signs and symptoms of infection  - Monitor lab/diagnostic results  - Monitor all insertion sites, i.e. indwelling lines, tubes, and drains  - Monitor endotracheal if appropriate and nasal secretions for changes in amount and color  - [place] appropriate cooling/warming therapies per order  - Administer medications as ordered  - Instruct and encourage patient and family to use good hand hygiene  technique  - Identify and instruct in appropriate isolation precautions for identified infection/condition  Outcome: Progressing     Problem: SAFETY ADULT  Goal: Patient will remain free of falls  Description: INTERVENTIONS:  - Educate patient/family on patient safety including physical limitations  - Instruct patient to call for assistance with activity   - Consult OT/PT to assist with strengthening/mobility   - Keep Call bell within reach  - Keep bed low and locked with side rails adjusted as appropriate  - Keep care items and personal belongings within reach  - Initiate and maintain comfort rounds  - Make Fall Risk Sign visible to staff  - Offer Toileting every 2 Hours, in advance of need  - Initiate/Maintain bed/chair alarm  - Obtain necessary fall risk management equipment: na  - Apply yellow socks and bracelet for high fall risk patients  - Consider moving patient to room near nurses station  Outcome: Progressing     Problem: DISCHARGE PLANNING  Goal: Discharge to home or other facility with appropriate resources  Description: INTERVENTIONS:  - Identify barriers to discharge w/patient and caregiver  - Arrange for needed discharge resources and transportation as appropriate  - Identify discharge learning needs (meds, wound care, etc.)  - Arrange for interpretive services to assist at discharge as needed  - Refer to Case Management Department for coordinating discharge planning if the patient needs post-hospital services based on physician/advanced practitioner order or complex needs related to functional status, cognitive ability, or social support system  Outcome: Progressing     Problem: Knowledge Deficit  Goal: Patient/family/caregiver demonstrates understanding of disease process, treatment plan, medications, and discharge instructions  Description: Complete learning assessment and assess knowledge base.  Interventions:  - Provide teaching at level of understanding  - Provide teaching via preferred learning  methods  Outcome: Progressing     Problem: RESPIRATORY - ADULT  Goal: Achieves optimal ventilation and oxygenation  Description: INTERVENTIONS:  - Assess for changes in respiratory status  - Assess for changes in mentation and behavior  - Position to facilitate oxygenation and minimize respiratory effort  - Oxygen administered by appropriate delivery if ordered  - Initiate smoking cessation education as indicated  - Encourage broncho-pulmonary hygiene including cough, deep breathe, Incentive Spirometry  - Assess the need for suctioning and aspirate as needed  - Assess and instruct to report SOB or any respiratory difficulty  - Respiratory Therapy support as indicated  Outcome: Progressing

## 2025-02-18 NOTE — ASSESSMENT & PLAN NOTE
A/e/b echo 2/13 EF 15%, G1DD, akinetic apical myocardium   In the setting of anxiety, depression, home stressors   Fluid overloaded on 2/12 requiring BiPAP and Lasix  Cardiology on consult   GDMT - Lasix 40 mg daily, Carvedilol 3.125mg daily, lisinopril 2.5 mg daily, Jardiance daily   Target euvolemic and net balance even to slightly negative   Repeat echocardiogram on 2/17  LVEF 45% - will not order LifeVest as OP with improving EF

## 2025-02-18 NOTE — PROGRESS NOTES
Patient:  KATHRYN SAUCEDO    MRN:  [id number]    Aidin Request ID:  [id number]    Level of care reserved:  Home Health Agency    Partner Reserved:  [facility] [place] [address] [phone]    Clinical needs requested:  skilled nursing, physical therapy, occupational therapy    Geography searched:  [id number]    Start of Service:    Request sent:  12:56pm EST on 2/17/2025 by Lesly Bledsoe    Partner reserved:  3:58pm EST on 2/18/2025 by Lesly Bledsoe    Choice list shared:  1:47pm EST on 2/17/2025 by Lesly Bledsoe

## 2025-02-18 NOTE — CASE MANAGEMENT
Case Management Discharge Planning Note    Patient name Bea Perez  Location /-01 MRN [id number]  : 1950 Date 2025       Current Admission Date: 2025  Current Admission Diagnosis:Multifocal pneumonia   Patient Active Problem List    Diagnosis Date Noted Date Diagnosed    Anxiety 2025     Elevated troponin 2025     Takotsubo cardiomyopathy 2025     Multifocal pneumonia 2025     Acute hypoxic respiratory failure (HCC) 2025     Sepsis with acute hypoxic respiratory failure (HCC) 2025     Hypertension, essential 07/15/2024     Bronchitis 2022     Hypercholesterolemia 2020     Current moderate episode of major depressive disorder without prior episode (HCC) 2019     Gastro-esophageal reflux disease with esophagitis 2019     Iron deficiency anemia secondary to inadequate dietary iron intake 2019     Generalized anxiety disorder 2019     Seasonal allergic rhinitis due to pollen 2019     Pharyngoesophageal dysphagia 2019       LOS (days): 7  Geometric Mean LOS (GMLOS) (days): 4.9  Days to GMLOS:-1.9     OBJECTIVE:  Risk of Unplanned Readmission Score: 25.55         Current admission status: Inpatient   Preferred Pharmacy:   [place] [address]  Phone: [phone] Fax: [phone]    [facility]/pharmacy #[id number] [place], [place] - [address] [place] [address]  Phone: [phone] Fax: [phone]    Primary Care Provider: Joyce Mcpherson MD    Primary Insurance: MEDICARE  Secondary Insurance:  FOR LIFE    DISCHARGE DETAILS:    Discharge planning discussed with:: patient and spouse  Freedom of Choice: Yes  Comments - Freedom of Choice: Spouse did review the list of [facility] agencies: choice is [facility]  CM contacted family/caregiver?: Yes  Were Treatment Team discharge  recommendations reviewed with patient/caregiver?: Yes     Were patient/caregiver advised of the risks associated with not following Treatment Team discharge recommendations?: Yes    Contacts  Patient Contacts: , marisa  Relationship to Patient:: Family  Contact Method: In Person  Reason/Outcome: Discharge Planning    Requested Home Health Care         Home Health Discipline requested:: Occupational Therapy, Nursing, Physical Therapy         Other Referral/Resources/Interventions Provided:  Referral Comments: [facility]         Treatment Team Recommendation: Short Term Rehab  Discharge Destination Plan:: Home with Home Health Care

## 2025-02-19 LAB
ANION GAP SERPL CALCULATED.3IONS-SCNC: 2 MMOL/L (ref 4–13)
BUN SERPL-MCNC: 23 MG/DL (ref 5–25)
CALCIUM SERPL-MCNC: 8.2 MG/DL (ref 8.4–10.2)
CHLORIDE SERPL-SCNC: 102 MMOL/L (ref 96–108)
CO2 SERPL-SCNC: 36 MMOL/L (ref 21–32)
CREAT SERPL-MCNC: 0.65 MG/DL (ref 0.6–1.3)
ERYTHROCYTE [DISTWIDTH] IN BLOOD BY AUTOMATED COUNT: 16.1 % (ref 11.6–15.1)
GFR SERPL CREATININE-BSD FRML MDRD: 87 ML/MIN/1.73SQ M
GLUCOSE SERPL-MCNC: 90 MG/DL (ref 65–140)
HCT VFR BLD AUTO: 33.6 % (ref 34.8–46.1)
HGB BLD-MCNC: 10.8 G/DL (ref 11.5–15.4)
MCH RBC QN AUTO: 29.8 PG (ref 26.8–34.3)
MCHC RBC AUTO-ENTMCNC: 32.1 G/DL (ref 31.4–37.4)
MCV RBC AUTO: 93 FL (ref 82–98)
PLATELET # BLD AUTO: 611 THOUSANDS/UL (ref 149–390)
PMV BLD AUTO: 9.3 FL (ref 8.9–12.7)
POTASSIUM SERPL-SCNC: 4.3 MMOL/L (ref 3.5–5.3)
RBC # BLD AUTO: 3.63 MILLION/UL (ref 3.81–5.12)
SODIUM SERPL-SCNC: 140 MMOL/L (ref 135–147)
WBC # BLD AUTO: 11.19 THOUSAND/UL (ref 4.31–10.16)

## 2025-02-19 PROCEDURE — 80048 BASIC METABOLIC PNL TOTAL CA: CPT

## 2025-02-19 PROCEDURE — 94640 AIRWAY INHALATION TREATMENT: CPT

## 2025-02-19 PROCEDURE — 94760 N-INVAS EAR/PLS OXIMETRY 1: CPT

## 2025-02-19 PROCEDURE — 99232 SBSQ HOSP IP/OBS MODERATE 35: CPT | Performed by: INTERNAL MEDICINE

## 2025-02-19 PROCEDURE — 85027 COMPLETE CBC AUTOMATED: CPT

## 2025-02-19 PROCEDURE — 94761 N-INVAS EAR/PLS OXIMETRY MLT: CPT

## 2025-02-19 RX ADMIN — LEVALBUTEROL HYDROCHLORIDE 1.25 MG: 1.25 SOLUTION RESPIRATORY (INHALATION) at 08:54

## 2025-02-19 RX ADMIN — DIVALPROEX SODIUM 250 MG: 125 CAPSULE, COATED PELLETS ORAL at 08:31

## 2025-02-19 RX ADMIN — FUROSEMIDE 40 MG: 40 TABLET ORAL at 08:31

## 2025-02-19 RX ADMIN — ACETAMINOPHEN 325MG 650 MG: 325 TABLET ORAL at 21:27

## 2025-02-19 RX ADMIN — GUAIFENESIN 600 MG: 600 TABLET ORAL at 17:12

## 2025-02-19 RX ADMIN — LEVALBUTEROL HYDROCHLORIDE 1.25 MG: 1.25 SOLUTION RESPIRATORY (INHALATION) at 19:30

## 2025-02-19 RX ADMIN — DULOXETINE HYDROCHLORIDE 30 MG: 30 CAPSULE, DELAYED RELEASE ORAL at 08:31

## 2025-02-19 RX ADMIN — IPRATROPIUM BROMIDE 0.5 MG: 0.5 SOLUTION RESPIRATORY (INHALATION) at 08:54

## 2025-02-19 RX ADMIN — LEVALBUTEROL HYDROCHLORIDE 1.25 MG: 1.25 SOLUTION RESPIRATORY (INHALATION) at 13:12

## 2025-02-19 RX ADMIN — CARVEDILOL 3.12 MG: 3.12 TABLET, FILM COATED ORAL at 17:12

## 2025-02-19 RX ADMIN — DIVALPROEX SODIUM 250 MG: 125 CAPSULE, COATED PELLETS ORAL at 17:12

## 2025-02-19 RX ADMIN — LORAZEPAM 0.5 MG: 0.5 TABLET ORAL at 21:27

## 2025-02-19 RX ADMIN — IPRATROPIUM BROMIDE 0.5 MG: 0.5 SOLUTION RESPIRATORY (INHALATION) at 13:12

## 2025-02-19 RX ADMIN — GUAIFENESIN 600 MG: 600 TABLET ORAL at 08:31

## 2025-02-19 RX ADMIN — IPRATROPIUM BROMIDE 0.5 MG: 0.5 SOLUTION RESPIRATORY (INHALATION) at 19:30

## 2025-02-19 RX ADMIN — LIDOCAINE 5% 1 PATCH: 700 PATCH TOPICAL at 08:33

## 2025-02-19 RX ADMIN — BUDESONIDE INHALATION 0.5 MG: 0.5 SUSPENSION RESPIRATORY (INHALATION) at 19:30

## 2025-02-19 RX ADMIN — ENOXAPARIN SODIUM 40 MG: 40 INJECTION SUBCUTANEOUS at 08:31

## 2025-02-19 RX ADMIN — LISINOPRIL 2.5 MG: 2.5 TABLET ORAL at 08:31

## 2025-02-19 RX ADMIN — EMPAGLIFLOZIN 10 MG: 10 TABLET, FILM COATED ORAL at 08:32

## 2025-02-19 RX ADMIN — BUDESONIDE INHALATION 0.5 MG: 0.5 SUSPENSION RESPIRATORY (INHALATION) at 08:54

## 2025-02-19 RX ADMIN — CARVEDILOL 3.12 MG: 3.12 TABLET, FILM COATED ORAL at 08:31

## 2025-02-19 RX ADMIN — OLANZAPINE 7.5 MG: 2.5 TABLET, FILM COATED ORAL at 21:27

## 2025-02-19 NOTE — ASSESSMENT & PLAN NOTE
No formal diagnosis of COPD, the patient smoked on and off for 30 years.   IV Rocephin  and Zithromax  Breathing treatments  IV solumedrol initially q 8 hours - has been weaned to stop    Likely has undiagnosed underlying COPD.  Will need pulmonary function test and outpatient pulmonology referral upon discharge.

## 2025-02-19 NOTE — ASSESSMENT & PLAN NOTE
A/e/b echo 2/13 EF 15%, G1DD, akinetic apical myocardium   In the setting of anxiety, depression, home stressors   Fluid overloaded on 2/12 requiring BiPAP and Lasix  Cardiology on consult   GDMT - Lasix 40 mg daily, Carvedilol 3.125mg daily, lisinopril 2.5 mg daily, Jardiance daily   Target euvolemic and net balance even to slightly negative   Repeat echocardiogram on 2/17  LVEF 45% - will not order LifeVest as OP with improving EF  Price check Jardiance today.  Low normal blood pressure noted.  Await tolerance of medications with current blood pressure prior to discharge in AM.

## 2025-02-19 NOTE — PLAN OF CARE
Problem: Potential for Falls  Goal: Patient will remain free of falls  Description: INTERVENTIONS:  - Educate patient/family on patient safety including physical limitations  - Instruct patient to call for assistance with activity   - Consult OT/PT to assist with strengthening/mobility   - Keep Call bell within reach  - Keep bed low and locked with side rails adjusted as appropriate  - Keep care items and personal belongings within reach  - Initiate and maintain comfort rounds  - Make Fall Risk Sign visible to staff  - Offer Toileting every 2 Hours, in advance of need  - Initiate/Maintain bed/chair alarm  - Obtain necessary fall risk management equipment: na  - Apply yellow socks and bracelet for high fall risk patients  - Consider moving patient to room near nurses station  Outcome: Progressing     Problem: PAIN - ADULT  Goal: Verbalizes/displays adequate comfort level or baseline comfort level  Description: Interventions:  - Encourage patient to monitor pain and request assistance  - Assess pain using appropriate pain scale  - Administer analgesics based on type and severity of pain and evaluate response  - Implement non-pharmacological measures as appropriate and evaluate response  - Consider cultural and social influences on pain and pain management  - Notify physician/advanced practitioner if interventions unsuccessful or patient reports new pain  Outcome: Progressing     Problem: INFECTION - ADULT  Goal: Absence or prevention of progression during hospitalization  Description: INTERVENTIONS:  - Assess and monitor for signs and symptoms of infection  - Monitor lab/diagnostic results  - Monitor all insertion sites, i.e. indwelling lines, tubes, and drains  - Monitor endotracheal if appropriate and nasal secretions for changes in amount and color  - [place] appropriate cooling/warming therapies per order  - Administer medications as ordered  - Instruct and encourage patient and family to use good hand hygiene  technique  - Identify and instruct in appropriate isolation precautions for identified infection/condition  Outcome: Progressing     Problem: SAFETY ADULT  Goal: Patient will remain free of falls  Description: INTERVENTIONS:  - Educate patient/family on patient safety including physical limitations  - Instruct patient to call for assistance with activity   - Consult OT/PT to assist with strengthening/mobility   - Keep Call bell within reach  - Keep bed low and locked with side rails adjusted as appropriate  - Keep care items and personal belongings within reach  - Initiate and maintain comfort rounds  - Make Fall Risk Sign visible to staff  - Offer Toileting every 2 Hours, in advance of need  - Initiate/Maintain bed/chair alarm  - Obtain necessary fall risk management equipment: na  - Apply yellow socks and bracelet for high fall risk patients  - Consider moving patient to room near nurses station  Outcome: Progressing     Problem: Knowledge Deficit  Goal: Patient/family/caregiver demonstrates understanding of disease process, treatment plan, medications, and discharge instructions  Description: Complete learning assessment and assess knowledge base.  Interventions:  - Provide teaching at level of understanding  - Provide teaching via preferred learning methods  Outcome: Progressing     Problem: RESPIRATORY - ADULT  Goal: Achieves optimal ventilation and oxygenation  Description: INTERVENTIONS:  - Assess for changes in respiratory status  - Assess for changes in mentation and behavior  - Position to facilitate oxygenation and minimize respiratory effort  - Oxygen administered by appropriate delivery if ordered  - Initiate smoking cessation education as indicated  - Encourage broncho-pulmonary hygiene including cough, deep breathe, Incentive Spirometry  - Assess the need for suctioning and aspirate as needed  - Assess and instruct to report SOB or any respiratory difficulty  - Respiratory Therapy support as  indicated  Outcome: Progressing

## 2025-02-19 NOTE — PROGRESS NOTES
Progress Note - Hospitalist   Name: Bea Perez 74 y.o. female I MRN: [id number]  Unit/Bed#: -01 I Date of Admission: 2/11/2025   Date of Service: 2/19/2025 I Hospital Day: 8    Assessment & Plan  Multifocal pneumonia  CT chest shows multifocal pneumonia with dense consolidation in both lower lobes  Urine strep and Legionella negative  Continue Mucinex and breathing treatments   Initially on Solu-Medrol 40 mg IV q hours, has been weaned and stopped   Completed 3 Days of Azithromycin, Currently on Ceftriaxone - antibiotic coverage to end 2/18     Acute hypoxic respiratory failure (HCC)  Patient went into acute hypoxic respiratory failure was a rapid response on 2/12 secondary to pulmonary edema.   Was given IV Lasix and started on BiPAP   Echocardiogram on 2/13 revealed LVEF 15%, akinetic apical myocardium consistent with Takotsubo cardiomyopathy   Repeat with improved EF see below   Transitioned to PO lasix on 2/15/25- continue 40 mg daily   Continue to wean oxygen as tolerated-weaned to 3 L nasal cannula today  Wean as tolerated   Home oxygen evaluation today.  Still requiring supplemental oxygen at rest.  Takotsubo cardiomyopathy  A/e/b echo 2/13 EF 15%, G1DD, akinetic apical myocardium   In the setting of anxiety, depression, home stressors   Fluid overloaded on 2/12 requiring BiPAP and Lasix  Cardiology on consult   GDMT - Lasix 40 mg daily, Carvedilol 3.125mg daily, lisinopril 2.5 mg daily, Jardiance daily   Target euvolemic and net balance even to slightly negative   Repeat echocardiogram on 2/17  LVEF 45% - will not order LifeVest as OP with improving EF  Price check Jardiance today.  Low normal blood pressure noted.  Await tolerance of medications with current blood pressure prior to discharge in AM.  Sepsis with acute hypoxic respiratory failure (HCC)  POA with tachycardia, leukocytosis secondary to multifocal pneumonia.  Blood cultures negative, procalcitonin downtrending   See further plan under  multifocal pneumona  Bronchitis  No formal diagnosis of COPD, the patient smoked on and off for 30 years.   IV Rocephin  and Zithromax  Breathing treatments  IV solumedrol initially q 8 hours - has been weaned to stop    Likely has undiagnosed underlying COPD.  Will need pulmonary function test and outpatient pulmonology referral upon discharge.  Current moderate episode of major depressive disorder without prior episode (HCC)  Continue with Depakote, Zyprexa and Cymbalta.  Anxiety  Patient has Ativan 0.5 mg PO as needed for anxiety   Elevated troponin  Secondary to demand ischemia   Cardiology following   Denies any current chest pain     VTE Pharmacologic Prophylaxis: VTE Score: 5 High Risk (Score >/= 5) - Pharmacological DVT Prophylaxis Ordered: enoxaparin (Lovenox). Sequential Compression Devices Ordered.    Mobility:   Basic Mobility Inpatient Raw Score: 23  JH-HLM Goal: 7: Walk 25 feet or more  JH-HLM Achieved: 8: Walk 250 feet ot more  JH-HLM Goal achieved. Continue to encourage appropriate mobility.    Patient Centered Rounds: I performed bedside rounds with nursing staff today.   Discussions with Specialists or Other Care Team Provider: Discussed with nursing    Education and Discussions with Family / Patient: Updated  () at bedside.    Current Length of Stay: 8 day(s)  Current Patient Status: Inpatient   Certification Statement: The patient will continue to require additional inpatient hospital stay due to await home oxygen set up an outpatient medication price check and monitoring of blood pressure on current medication  Discharge Plan: Anticipate discharge tomorrow to home with home services.    Code Status: Level 1 - Full Code    Subjective   Seen and examined at bedside.  Noted to have saturation in the mid 80s on room air.    Objective :  Temp:  [97.5 °F (36.4 °C)-98.1 °F (36.7 °C)] 98.1 °F (36.7 °C)  HR:  [82-95] 82  BP: ()/(53-56) 113/53  Resp:  [23] 23  SpO2:  [90 %-96 %]  90 %  O2 Device: Nasal cannula  Nasal Cannula O2 Flow Rate (L/min):  [1 L/min-3.5 L/min] 1 L/min    Body mass index is 24.51 kg/m².     Input and Output Summary (last 24 hours):     Intake/Output Summary (Last 24 hours) at 2/19/2025 1506  Last data filed at 2/19/2025 1201  Gross per 24 hour   Intake 420 ml   Output 1575 ml   Net -1155 ml       Physical Exam  Constitutional:       Appearance: She is ill-appearing.   Eyes:      Pupils: Pupils are equal, round, and reactive to light.   Cardiovascular:      Rate and Rhythm: Normal rate and regular rhythm.   Pulmonary:      Comments: Scattered expiratory wheezing  Abdominal:      General: Abdomen is flat. Bowel sounds are normal.      Palpations: Abdomen is soft.   Musculoskeletal:      Right lower leg: No edema.      Left lower leg: No edema.   Skin:     General: Skin is warm.   Neurological:      General: No focal deficit present.      Mental Status: She is alert. Mental status is at baseline.           Lines/Drains:              Lab Results: I have reviewed the following results:   Results from last 7 days   Lab Units 02/19/25  0503 02/14/25  0611 02/13/25 0449 02/12/25  1931   WBC Thousand/uL 11.19*   < > 24.18* 29.16*   HEMOGLOBIN g/dL 10.8*   < > 9.3* 10.8*   HEMATOCRIT % 33.6*   < > 28.7* 32.9*   PLATELETS Thousands/uL 611*   < > 297 320   BANDS PCT %  --   --  13*  --    SEGS PCT %  --   --   --  93*   LYMPHO PCT %  --   --  4* 1*   MONO PCT %  --   --  4 5   EOS PCT %  --   --  0 0    < > = values in this interval not displayed.     Results from last 7 days   Lab Units 02/19/25  0503 02/13/25 0449 02/12/25  1931   SODIUM mmol/L 140   < > 140   POTASSIUM mmol/L 4.3   < > 4.3   CHLORIDE mmol/L 102   < > 106   CO2 mmol/L 36*   < > 27   BUN mg/dL 23   < > 18   CREATININE mg/dL 0.65   < > 0.80   ANION GAP mmol/L 2*   < > 7   CALCIUM mg/dL 8.2*   < > 8.6   ALBUMIN g/dL  --   --  3.1*   TOTAL BILIRUBIN mg/dL  --   --  0.31   ALK PHOS U/L  --   --  86   ALT U/L  --    --  25   AST U/L  --   --  44*   GLUCOSE RANDOM mg/dL 90   < > 198*    < > = values in this interval not displayed.         Results from last 7 days   Lab Units 02/12/25  1835   POC GLUCOSE mg/dl 203*         Results from last 7 days   Lab Units 02/12/25  2243 02/12/25  1931   LACTIC ACID mmol/L 1.2 2.2*       Recent Cultures (last 7 days):               Last 24 Hours Medication List:     Current Facility-Administered Medications:     acetaminophen (TYLENOL) tablet 650 mg, Q6H PRN    budesonide (PULMICORT) inhalation solution 0.5 mg, Q12H    carvedilol (COREG) tablet 3.125 mg, BID With Meals    divalproex sodium (DEPAKOTE SPRINKLE) capsule 250 mg, BID    DULoxetine (CYMBALTA) delayed release capsule 30 mg, Daily    Empagliflozin (JARDIANCE) tablet 10 mg, Daily    enoxaparin (LOVENOX) subcutaneous injection 40 mg, Daily    furosemide (LASIX) tablet 40 mg, Daily    guaiFENesin (MUCINEX) 12 hr tablet 600 mg, BID    ipratropium (ATROVENT) 0.02 % inhalation solution 0.5 mg, TID    levalbuterol (XOPENEX) inhalation solution 1.25 mg, TID **AND** [DISCONTINUED] sodium chloride 0.9 % inhalation solution 3 mL, TID    lidocaine (LIDODERM) 5 % patch 1 patch, Daily    lisinopril (ZESTRIL) tablet 2.5 mg, Daily    LORazepam (ATIVAN) tablet 0.5 mg, Q6H PRN    OLANZapine (ZyPREXA) tablet 7.5 mg, HS    perflutren lipid microsphere (DEFINITY) injection, Once in imaging    Administrative Statements   Today, Patient Was Seen By: Mabel Morales MD      **Please Note: This note may have been constructed using a voice recognition system.**

## 2025-02-19 NOTE — ASSESSMENT & PLAN NOTE
Patient went into acute hypoxic respiratory failure was a rapid response on 2/12 secondary to pulmonary edema.   Was given IV Lasix and started on BiPAP   Echocardiogram on 2/13 revealed LVEF 15%, akinetic apical myocardium consistent with Takotsubo cardiomyopathy   Repeat with improved EF see below   Transitioned to PO lasix on 2/15/25- continue 40 mg daily   Continue to wean oxygen as tolerated-weaned to 3 L nasal cannula today  Wean as tolerated   Home oxygen evaluation today.  Still requiring supplemental oxygen at rest.

## 2025-02-19 NOTE — CASE MANAGEMENT
Case Management Discharge Planning Note    Patient name Bea Perez  Location /-01 MRN [id number]  : 1950 Date 2025       Current Admission Date: 2025  Current Admission Diagnosis:Multifocal pneumonia   Patient Active Problem List    Diagnosis Date Noted Date Diagnosed    Anxiety 2025     Elevated troponin 2025     Takotsubo cardiomyopathy 2025     Multifocal pneumonia 2025     Acute hypoxic respiratory failure (HCC) 2025     Sepsis with acute hypoxic respiratory failure (HCC) 2025     Hypertension, essential 07/15/2024     Bronchitis 2022     Hypercholesterolemia 2020     Current moderate episode of major depressive disorder without prior episode (HCC) 2019     Gastro-esophageal reflux disease with esophagitis 2019     Iron deficiency anemia secondary to inadequate dietary iron intake 2019     Generalized anxiety disorder 2019     Seasonal allergic rhinitis due to pollen 2019     Pharyngoesophageal dysphagia 2019       LOS (days): 8  Geometric Mean LOS (GMLOS) (days): 4.9  Days to GMLOS:-2.9     OBJECTIVE:  Risk of Unplanned Readmission Score: 23.23         Current admission status: Inpatient   Preferred Pharmacy:   [place] [address]  Phone: [phone] Fax: [phone]    [facility]/pharmacy #[id number] [place], [place] - [address] [place] [address]  Phone: [phone] Fax: [phone]    Primary Care Provider: Joyce Mcpherson MD    Primary Insurance: MEDICARE  Secondary Insurance:  FOR LIFE    DISCHARGE DETAILS:     Garcia checked Jardiance, cost is $43 and available at [facility].  Spoke to Mr Perez and this is affordable.  Updated [facility] tomorrow.

## 2025-02-19 NOTE — RESPIRATORY THERAPY NOTE
Home Oxygen Qualifying Test     Patient name: Bea Perez        : 1950   Date of Test:  2025  Diagnosis:    Home Oxygen Test:    **Medicare Guidelines require item(s) 1-5 on all ambulatory patients or 1 and 2 on non-ambulatory patients.    1. Baseline SPO2 on Room Air at rest 87 %   If <= 88% on Room Air add O2 via [place] to obtain SpO2 >=88%. If LPM needed, document LPM 1 needed to reach =>88%    SPO2 during exertion on Room Air   %  During exertion monitor SPO2. If SPO2 increases >=89%, do not add supplemental oxygen    SPO2 on Oxygen at Rest 90 % at 1 LPM    SPO2 during exertion on Oxygen 91 % at 4 LPM    Test performed during exertion activity.      [x]  Supplemental Home Oxygen is indicated.    []  Client does not qualify for home oxygen.    Respiratory Additional Notes-       Yolis Rosen, RT

## 2025-02-20 VITALS
TEMPERATURE: 97.3 F | BODY MASS INDEX: 24.66 KG/M2 | HEART RATE: 86 BPM | HEIGHT: 62 IN | SYSTOLIC BLOOD PRESSURE: 111 MMHG | WEIGHT: 134 LBS | OXYGEN SATURATION: 93 % | RESPIRATION RATE: 18 BRPM | DIASTOLIC BLOOD PRESSURE: 56 MMHG

## 2025-02-20 PROBLEM — J18.9 PNEUMONIA: Status: ACTIVE | Noted: 2025-02-20

## 2025-02-20 LAB
ANION GAP SERPL CALCULATED.3IONS-SCNC: 3 MMOL/L (ref 4–13)
BUN SERPL-MCNC: 26 MG/DL (ref 5–25)
CALCIUM SERPL-MCNC: 8.4 MG/DL (ref 8.4–10.2)
CHLORIDE SERPL-SCNC: 103 MMOL/L (ref 96–108)
CO2 SERPL-SCNC: 34 MMOL/L (ref 21–32)
CREAT SERPL-MCNC: 0.64 MG/DL (ref 0.6–1.3)
DME PARACHUTE DELIVERY DATE ACTUAL: NORMAL
DME PARACHUTE DELIVERY DATE EXPECTED: NORMAL
DME PARACHUTE DELIVERY DATE REQUESTED: NORMAL
DME PARACHUTE ITEM DESCRIPTION: NORMAL
DME PARACHUTE ORDER STATUS: NORMAL
DME PARACHUTE SUPPLIER NAME: NORMAL
DME PARACHUTE SUPPLIER PHONE: NORMAL
GFR SERPL CREATININE-BSD FRML MDRD: 88 ML/MIN/1.73SQ M
GLUCOSE SERPL-MCNC: 89 MG/DL (ref 65–140)
POTASSIUM SERPL-SCNC: 4.3 MMOL/L (ref 3.5–5.3)
SODIUM SERPL-SCNC: 140 MMOL/L (ref 135–147)

## 2025-02-20 PROCEDURE — 94640 AIRWAY INHALATION TREATMENT: CPT

## 2025-02-20 PROCEDURE — 94760 N-INVAS EAR/PLS OXIMETRY 1: CPT

## 2025-02-20 PROCEDURE — 80048 BASIC METABOLIC PNL TOTAL CA: CPT | Performed by: INTERNAL MEDICINE

## 2025-02-20 PROCEDURE — 99239 HOSP IP/OBS DSCHRG MGMT >30: CPT

## 2025-02-20 RX ORDER — LISINOPRIL 2.5 MG/1
2.5 TABLET ORAL DAILY
Qty: 30 TABLET | Refills: 0 | Status: SHIPPED | OUTPATIENT
Start: 2025-02-21 | End: 2025-03-05 | Stop reason: SINTOL

## 2025-02-20 RX ORDER — FUROSEMIDE 40 MG/1
40 TABLET ORAL DAILY
Qty: 30 TABLET | Refills: 0 | Status: SHIPPED | OUTPATIENT
Start: 2025-02-21 | End: 2025-03-05 | Stop reason: SDUPTHER

## 2025-02-20 RX ORDER — CARVEDILOL 3.12 MG/1
3.12 TABLET ORAL 2 TIMES DAILY WITH MEALS
Qty: 60 TABLET | Refills: 0 | Status: SHIPPED | OUTPATIENT
Start: 2025-02-20 | End: 2025-03-05 | Stop reason: SDUPTHER

## 2025-02-20 RX ADMIN — EMPAGLIFLOZIN 10 MG: 10 TABLET, FILM COATED ORAL at 08:53

## 2025-02-20 RX ADMIN — ENOXAPARIN SODIUM 40 MG: 40 INJECTION SUBCUTANEOUS at 08:51

## 2025-02-20 RX ADMIN — GUAIFENESIN 600 MG: 600 TABLET ORAL at 08:52

## 2025-02-20 RX ADMIN — LISINOPRIL 2.5 MG: 2.5 TABLET ORAL at 08:52

## 2025-02-20 RX ADMIN — IPRATROPIUM BROMIDE 0.5 MG: 0.5 SOLUTION RESPIRATORY (INHALATION) at 07:36

## 2025-02-20 RX ADMIN — FUROSEMIDE 40 MG: 40 TABLET ORAL at 08:51

## 2025-02-20 RX ADMIN — CARBAMIDE PEROXIDE - 6.5% 5 DROP: 65 SOLUTION/ DROPS TOPICAL at 14:40

## 2025-02-20 RX ADMIN — CARVEDILOL 3.12 MG: 3.12 TABLET, FILM COATED ORAL at 08:51

## 2025-02-20 RX ADMIN — LIDOCAINE 5% 1 PATCH: 700 PATCH TOPICAL at 08:52

## 2025-02-20 RX ADMIN — BUDESONIDE INHALATION 0.5 MG: 0.5 SUSPENSION RESPIRATORY (INHALATION) at 07:36

## 2025-02-20 RX ADMIN — DULOXETINE HYDROCHLORIDE 30 MG: 30 CAPSULE, DELAYED RELEASE ORAL at 08:53

## 2025-02-20 RX ADMIN — LEVALBUTEROL HYDROCHLORIDE 1.25 MG: 1.25 SOLUTION RESPIRATORY (INHALATION) at 07:36

## 2025-02-20 RX ADMIN — DIVALPROEX SODIUM 250 MG: 125 CAPSULE, COATED PELLETS ORAL at 08:52

## 2025-02-20 NOTE — PLAN OF CARE
Problem: Potential for Falls  Goal: Patient will remain free of falls  Description: INTERVENTIONS:  - Educate patient/family on patient safety including physical limitations  - Instruct patient to call for assistance with activity   - Consult OT/PT to assist with strengthening/mobility   - Keep Call bell within reach  - Keep bed low and locked with side rails adjusted as appropriate  - Keep care items and personal belongings within reach  - Initiate and maintain comfort rounds  - Make Fall Risk Sign visible to staff  - Offer Toileting every 2 Hours, in advance of need  - Initiate/Maintain bed/chair alarm  - Obtain necessary fall risk management equipment: na  - Apply yellow socks and bracelet for high fall risk patients  - Consider moving patient to room near nurses station  Outcome: Progressing     Problem: PAIN - ADULT  Goal: Verbalizes/displays adequate comfort level or baseline comfort level  Description: Interventions:  - Encourage patient to monitor pain and request assistance  - Assess pain using appropriate pain scale  - Administer analgesics based on type and severity of pain and evaluate response  - Implement non-pharmacological measures as appropriate and evaluate response  - Consider cultural and social influences on pain and pain management  - Notify physician/advanced practitioner if interventions unsuccessful or patient reports new pain  Outcome: Progressing     Problem: INFECTION - ADULT  Goal: Absence or prevention of progression during hospitalization  Description: INTERVENTIONS:  - Assess and monitor for signs and symptoms of infection  - Monitor lab/diagnostic results  - Monitor all insertion sites, i.e. indwelling lines, tubes, and drains  - Monitor endotracheal if appropriate and nasal secretions for changes in amount and color  - [place] appropriate cooling/warming therapies per order  - Administer medications as ordered  - Instruct and encourage patient and family to use good hand hygiene  technique  - Identify and instruct in appropriate isolation precautions for identified infection/condition  Outcome: Progressing     Problem: SAFETY ADULT  Goal: Patient will remain free of falls  Description: INTERVENTIONS:  - Educate patient/family on patient safety including physical limitations  - Instruct patient to call for assistance with activity   - Consult OT/PT to assist with strengthening/mobility   - Keep Call bell within reach  - Keep bed low and locked with side rails adjusted as appropriate  - Keep care items and personal belongings within reach  - Initiate and maintain comfort rounds  - Make Fall Risk Sign visible to staff  - Offer Toileting every 2 Hours, in advance of need  - Initiate/Maintain bed/chair alarm  - Obtain necessary fall risk management equipment: na  - Apply yellow socks and bracelet for high fall risk patients  - Consider moving patient to room near nurses station  Outcome: Progressing     Problem: DISCHARGE PLANNING  Goal: Discharge to home or other facility with appropriate resources  Description: INTERVENTIONS:  - Identify barriers to discharge w/patient and caregiver  - Arrange for needed discharge resources and transportation as appropriate  - Identify discharge learning needs (meds, wound care, etc.)  - Arrange for interpretive services to assist at discharge as needed  - Refer to Case Management Department for coordinating discharge planning if the patient needs post-hospital services based on physician/advanced practitioner order or complex needs related to functional status, cognitive ability, or social support system  Outcome: Progressing     Problem: Knowledge Deficit  Goal: Patient/family/caregiver demonstrates understanding of disease process, treatment plan, medications, and discharge instructions  Description: Complete learning assessment and assess knowledge base.  Interventions:  - Provide teaching at level of understanding  - Provide teaching via preferred learning  methods  Outcome: Progressing     Problem: RESPIRATORY - ADULT  Goal: Achieves optimal ventilation and oxygenation  Description: INTERVENTIONS:  - Assess for changes in respiratory status  - Assess for changes in mentation and behavior  - Position to facilitate oxygenation and minimize respiratory effort  - Oxygen administered by appropriate delivery if ordered  - Initiate smoking cessation education as indicated  - Encourage broncho-pulmonary hygiene including cough, deep breathe, Incentive Spirometry  - Assess the need for suctioning and aspirate as needed  - Assess and instruct to report SOB or any respiratory difficulty  - Respiratory Therapy support as indicated  Outcome: Progressing

## 2025-02-20 NOTE — CASE MANAGEMENT
Case Management Discharge Planning Note    Patient name Bea Perez  Location /-01 MRN [id number]  : 1950 Date 2025       Current Admission Date: 2025  Current Admission Diagnosis:Multifocal pneumonia   Patient Active Problem List    Diagnosis Date Noted Date Diagnosed    Anxiety 2025     Elevated troponin 2025     Takotsubo cardiomyopathy 2025     Multifocal pneumonia 2025     Acute hypoxic respiratory failure (HCC) 2025     Sepsis with acute hypoxic respiratory failure (HCC) 2025     Hypertension, essential 07/15/2024     Bronchitis 2022     Hypercholesterolemia 2020     Current moderate episode of major depressive disorder without prior episode (HCC) 2019     Gastro-esophageal reflux disease with esophagitis 2019     Iron deficiency anemia secondary to inadequate dietary iron intake 2019     Generalized anxiety disorder 2019     Seasonal allergic rhinitis due to pollen 2019     Pharyngoesophageal dysphagia 2019       LOS (days): 9  Geometric Mean LOS (GMLOS) (days): 4.9  Days to GMLOS:-3.6     OBJECTIVE:  Risk of Unplanned Readmission Score: 23.09         Current admission status: Inpatient   Preferred Pharmacy:   [place] [address]  Phone: [phone] Fax: [phone]    [facility]/pharmacy #[id number] [place], [place] - [address] [place] [address]  Phone: [phone] Fax: [phone]    Primary Care Provider: Joyce Mcpherson MD    Primary Insurance: MEDICARE  Secondary Insurance:  FOR LIFE    DISCHARGE DETAILS:     Patient will need 1 liter of 02 at rest and 4 liters with exertion.  [place] order was placed.   Information was placed on AVS for home 02 use.  Will provide pt with a pulse oximeter and 1 tank for delivery.       Called placed to Jean Carlos Perez (Spouse)  [phone] left a message informing him of the 02 needs for Bea and to let him know he will be getting a call from the DME company, [employer].

## 2025-02-20 NOTE — ASSESSMENT & PLAN NOTE
CT chest shows multifocal pneumonia with dense consolidation in both lower lobes  Urine strep and Legionella negative  Continue Mucinex and breathing treatments   Initially on Solu-Medrol 40 mg IV q hours, has been weaned and stopped   Completed azithromycin and Rocephin courses

## 2025-02-20 NOTE — CASE MANAGEMENT
Case Management Discharge Planning Note    Patient name Bea Perez  Location /-01 MRN [id number]  : 1950 Date 2025       Current Admission Date: 2025  Current Admission Diagnosis:Multifocal pneumonia   Patient Active Problem List    Diagnosis Date Noted Date Diagnosed    Anxiety 2025     Elevated troponin 2025     Takotsubo cardiomyopathy 2025     Multifocal pneumonia 2025     Acute hypoxic respiratory failure (HCC) 2025     Sepsis with acute hypoxic respiratory failure (HCC) 2025     Hypertension, essential 07/15/2024     Bronchitis 2022     Hypercholesterolemia 2020     Current moderate episode of major depressive disorder without prior episode (HCC) 2019     Gastro-esophageal reflux disease with esophagitis 2019     Iron deficiency anemia secondary to inadequate dietary iron intake 2019     Generalized anxiety disorder 2019     Seasonal allergic rhinitis due to pollen 2019     Pharyngoesophageal dysphagia 2019       LOS (days): 9  Geometric Mean LOS (GMLOS) (days): 4.9  Days to GMLOS:-3.7     OBJECTIVE:  Risk of Unplanned Readmission Score: 23.14         Current admission status: Inpatient   Preferred Pharmacy:   [place] [address]  Phone: [phone] Fax: [phone]    [facility]/pharmacy #[id number] [place], [place] - [address] [place] [address]  Phone: [phone] Fax: [phone]    Primary Care Provider: Joyce Mcpherson MD    Primary Insurance: MEDICARE  Secondary Insurance:  FOR LIFE    DISCHARGE DETAILS:    Spoke to patient and spouse at the bedside.  Provided a pulse oximeter for home use.  Educated on the 02 process of hoem 02, and Mr Perez was in contact with [employer] and they will deliver to the home about 4 PM.  CM used teach back for education on how to open the  02, regulate the flow rate with both Mr and Mrs Perez.  2 portable tanks were provided and delivery slip was signed..    [facility] is aware of dc today.

## 2025-02-20 NOTE — CASE MANAGEMENT
Case Management Discharge Planning Note    Patient name Bea Perez  Location /-01 MRN [id number]  : 1950 Date 2025       Current Admission Date: 2025  Current Admission Diagnosis:Multifocal pneumonia   Patient Active Problem List    Diagnosis Date Noted Date Diagnosed    Pneumonia 2025     Anxiety 2025     Elevated troponin 2025     Takotsubo cardiomyopathy 2025     Multifocal pneumonia 2025     Acute hypoxic respiratory failure (HCC) 2025     Sepsis with acute hypoxic respiratory failure (HCC) 2025     Hypertension, essential 07/15/2024     Bronchitis 2022     Hypercholesterolemia 2020     Current moderate episode of major depressive disorder without prior episode (HCC) 2019     Gastro-esophageal reflux disease with esophagitis 2019     Iron deficiency anemia secondary to inadequate dietary iron intake 2019     Generalized anxiety disorder 2019     Seasonal allergic rhinitis due to pollen 2019     Pharyngoesophageal dysphagia 2019       LOS (days): 9  Geometric Mean LOS (GMLOS) (days): 4.9  Days to GMLOS:-3.8     OBJECTIVE:  Risk of Unplanned Readmission Score: 24.31         Current admission status: Inpatient   Preferred Pharmacy:   [place] [id number]  Phone: [phone] Fax: [phone]    [facility]/pharmacy #[id number] [place], [place] - [address] [place] [address]  Phone: [phone] Fax: [phone]    Primary Care Provider: Joyce Mcpherson MD    Primary Insurance: MEDICARE  Secondary Insurance:  FOR LIFE    DISCHARGE DETAILS:     AVS was forward to [facility].

## 2025-02-20 NOTE — ASSESSMENT & PLAN NOTE
No formal diagnosis of COPD, the patient smoked on and off for 30 years.   IV Rocephin  and Zithromax completed  Breathing treatments  IV solumedrol initially q 8 hours - has been weaned to stop    Likely has undiagnosed underlying COPD.  Pulmonary referral made on discharge

## 2025-02-20 NOTE — ASSESSMENT & PLAN NOTE
Patient went into acute hypoxic respiratory failure was a rapid response on 2/12 secondary to pulmonary edema.   Was given IV Lasix and started on BiPAP   Echocardiogram on 2/13 revealed LVEF 15%, akinetic apical myocardium consistent with Takotsubo cardiomyopathy   Repeat with improved EF see below   Transitioned to PO lasix on 2/15/25- continue 40 mg daily   Home oxygen study shows patient needs 1 L at rest and 4 L with exertion

## 2025-02-20 NOTE — DISCHARGE INSTR - AVS FIRST PAGE
Dear Bea Perez,     It was our pleasure to care for you here at [facility]. For follow up as well as any medication refills, we recommend that you follow up with your primary care physician. Here are the most important instructions/ recommendations at discharge:     Notable Medication Adjustments -   Start Debrox 5 drops into both ears twice daily  Start Coreg 3.125 mg twice daily  Start Jardiance 10 mg every morning  Start Lasix 40 mg every morning  Start lisinopril 2.5 mg every morning  Testing Required after Discharge - ** Please contact your PCP to request testing orders for any of the testing recommended here **  None  Important follow up information -   Follow-up with cardiology  Follow-up with pulmonology  Follow with family doctor  Other Instructions -   Wear 1 L of oxygen at rest and 4 L with exertion  If symptoms worsen or new symptoms arise, come back to the hospital  Please review this entire after visit summary as additional general instructions including medication list, appointments, activity, diet, any pertinent wound care, and other additional recommendations from your care team that may be provided for you.      Sincerely,     Marla Talley PA-C

## 2025-02-20 NOTE — DISCHARGE SUMMARY
Discharge Summary - Hospitalist   Name: Bea Perez 74 y.o. female I MRN: [id number]  Unit/Bed#: -01 I Date of Admission: 2/11/2025   Date of Service: 2/20/2025 I Hospital Day: 9     Assessment & Plan  Multifocal pneumonia  CT chest shows multifocal pneumonia with dense consolidation in both lower lobes  Urine strep and Legionella negative  Continue Mucinex and breathing treatments   Initially on Solu-Medrol 40 mg IV q hours, has been weaned and stopped   Completed azithromycin and Rocephin courses  Acute hypoxic respiratory failure (HCC)  Patient went into acute hypoxic respiratory failure was a rapid response on 2/12 secondary to pulmonary edema.   Was given IV Lasix and started on BiPAP   Echocardiogram on 2/13 revealed LVEF 15%, akinetic apical myocardium consistent with Takotsubo cardiomyopathy   Repeat with improved EF see below   Transitioned to PO lasix on 2/15/25- continue 40 mg daily   Home oxygen study shows patient needs 1 L at rest and 4 L with exertion  Takotsubo cardiomyopathy  A/e/b echo 2/13 EF 15%, G1DD, akinetic apical myocardium   In the setting of anxiety, depression, home stressors   Fluid overloaded on 2/12 requiring BiPAP and Lasix  Cardiology on consult   GDMT - Lasix 40 mg daily, Carvedilol 3.125mg daily, lisinopril 2.5 mg daily, Jardiance daily   Target euvolemic and net balance even to slightly negative   Repeat echocardiogram on 2/17  LVEF 45% - will not order LifeVest as OP with improving EF  Sepsis with acute hypoxic respiratory failure (HCC)  POA with tachycardia, leukocytosis secondary to multifocal pneumonia.  Blood cultures negative, procalcitonin downtrending   See further plan under multifocal pneumona  Bronchitis  No formal diagnosis of COPD, the patient smoked on and off for 30 years.   IV Rocephin  and Zithromax completed  Breathing treatments  IV solumedrol initially q 8 hours - has been weaned to stop    Likely has undiagnosed underlying COPD.  Pulmonary referral  made on discharge  Current moderate episode of major depressive disorder without prior episode (HCC)  Continue with Depakote, Zyprexa and Cymbalta.  Anxiety  Patient has Ativan 0.5 mg PO as needed for anxiety   Elevated troponin  Secondary to demand ischemia   Cardiology following   Denies any current chest pain   Pneumonia       Medical Problems       Resolved Problems  Date Reviewed: 2/16/2025          Resolved    COPD with acute exacerbation (HCC) 2/11/2025     Resolved by  Jermaine Prescott MD    Flash pulmonary edema (HCC) 2/18/2025     Resolved by  Suzi Bermudez PA-C    Hypotension 2/14/2025     Resolved by  Neo Campbell PA-C    Cardiogenic shock (HCC) 2/17/2025     Resolved by  Suzi Bermudez PA-C          MESSAGE TO PCP (Joyce Mcpherson MD) FOR FOLLOW UP:   Thank you for allowing us to participate in the care of your patient, Bea Perez, who was hospitalized from 2/11/2025 through 02/20/25 with the admitting diagnosis of multifocal pneumonia.  Patient found to have multifocal pneumonia and was treated with IV antibiotics and IV steroids.  Patient then required higher level of care and was upgraded to ICU service for continuous BiPAP and aggressive diuresis.  Respiratory status improved.  However, echocardiogram showing Takotsubo cardiomyopathy.  Cardiology was consulted and patient started on GDMT with carvedilol, Jardiance, Lasix and lisinopril.  Repeat echo showing EF improved to 45% from 15%.  Patient does not need LifeVest at time of discharge.  Patient should follow-up with cardiology at time of discharge.  Patient also should follow-up with pulmonology due to likely underlying COPD.  Patient completed antibiotics for pneumonia and respiratory status greatly improved.  Home O2 study showing patient needs 1 L of oxygen at rest and 4 L with exertion.  Steroids have been stopped by time of discharge.    Medication Changes:  Start Debrox 5 drops into both ears twice daily  Start Coreg 3.125 mg  twice daily  Start Jardiance 10 mg every morning  Start Lasix 40 mg every morning  Start lisinopril 2.5 mg every morning  Outpatient testing recommended:  None   If you have any additional questions or would like to discuss further, please feel free to contact me.  Marla Talley PA-C  [facility] Internal Medicine, Hospitalist, [phone]     Admission Date:   Admission Orders (From admission, onward)       Ordered        02/11/25 2112  INPATIENT ADMISSION  Once                          Discharge Date: 02/20/25    Consultations During Hospital Stay:  Cardiology    Procedures Performed:   None    Significant Findings / Test Results:   Chest x-ray with left perihilar and right lung base consolidation likely representing pneumonia  CTA chest negative for acute PE.  Multifocal pneumonia with dense consolidation in both lower lobes.  Small left and trace right pleural effusion  Chest x-ray with worsening bilateral airspace disease as described above    Incidental Findings:   None    Test Results Pending at Discharge (will require follow up):   None     Complications: None    Reason for Admission: Multifocal pneumonia    Hospital Course:   Bea Perez is a 74 y.o. female patient who originally presented to the hospital on 2/11/2025 due to shortness of breath and cough.  Patient found to have multifocal pneumonia and was treated with IV antibiotics and IV steroids.  Patient then required higher level of care and was upgraded to ICU service for continuous BiPAP and aggressive diuresis.  Respiratory status improved.  However, echocardiogram showing Takotsubo cardiomyopathy.  Cardiology was consulted and patient started on GDMT with carvedilol, Jardiance, Lasix and lisinopril.  Repeat echo showing EF improved to 45% from 15%.  Patient does not need LifeVest at time of discharge.  Patient should follow-up with cardiology at time of discharge.  Patient also should follow-up with pulmonology due to likely underlying COPD.   "Patient completed antibiotics for pneumonia and respiratory status greatly improved.  Home O2 study showing patient needs 1 L of oxygen at rest and 4 L with exertion.  Steroids have been stopped by time of discharge.      Please see above list of diagnoses and related plan for additional information.     Condition at Discharge: good    Discharge Day Visit / Exam:   Subjective: Patient states that she is feeling well today.  States that her breathing is much improved.  States that she does still have some congestion in her sinuses.  States that it is getting harder for her to hear.  States that she has outpatient follow-up with ENT scheduled.  Agreeable to Debrox drops.  Patient agreeable to outpatient cardiology follow-up.  Vitals: Blood Pressure: 111/56 (02/20/25 0851)  Pulse: 86 (02/20/25 0851)  Temperature: (!) 97.3 °F (36.3 °C) (02/20/25 0720)  Temp Source: Temporal (02/20/25 0720)  Respirations: 18 (02/20/25 0720)  Height: 5' 2\" (157.5 cm) (02/17/25 0700)  Weight - Scale: 60.8 kg (134 lb) (02/17/25 0700)  SpO2: 93 % (02/20/25 0737)  Physical Exam  Constitutional:       Appearance: She is ill-appearing.   HENT:      Right Ear: There is impacted cerumen.      Left Ear: There is impacted cerumen.      Nose: Nose normal.      Mouth/Throat:      Mouth: Mucous membranes are moist.   Eyes:      Pupils: Pupils are equal, round, and reactive to light.   Cardiovascular:      Rate and Rhythm: Normal rate and regular rhythm.      Heart sounds: Normal heart sounds. No murmur heard.  Pulmonary:      Effort: No respiratory distress.      Breath sounds: Normal breath sounds. No wheezing or rhonchi.      Comments: Wet cough on exam  Abdominal:      General: Abdomen is flat. Bowel sounds are normal. There is no distension.      Palpations: Abdomen is soft.      Tenderness: There is no abdominal tenderness.   Musculoskeletal:         General: Normal range of motion.      Right lower leg: No edema.      Left lower leg: No edema. "   Skin:     General: Skin is warm.   Neurological:      General: No focal deficit present.      Mental Status: She is alert. Mental status is at baseline.   Psychiatric:         Mood and Affect: Mood normal.         Behavior: Behavior normal.         Thought Content: Thought content normal.         Judgment: Judgment normal.          Discussion with Family: Updated  (son) at bedside.    Discharge instructions/Information to patient and family:   See after visit summary for information provided to patient and family.      Provisions for Follow-Up Care:  See after visit summary for information related to follow-up care and any pertinent home health orders.      Mobility at time of Discharge:   Basic Mobility Inpatient Raw Score: 23  JH-HLM Goal: 7: Walk 25 feet or more  JH-HLM Achieved: 8: Walk 250 feet ot more  HLM Goal achieved. Continue to encourage appropriate mobility.     Disposition:   Home with VNA Services (Reminder: Complete face to face encounter)    Planned Readmission: none    Discharge Medications:  See after visit summary for reconciled discharge medications provided to patient and/or family.      Administrative Statements   Discharge Statement:  I have spent a total time of 45 minutes in caring for this patient on the day of the visit/encounter. .    **Please Note: This note may have been constructed using a voice recognition system**

## 2025-02-20 NOTE — DISCHARGE INSTR - OTHER ORDERS
Bea, you are to use 1 liter of 02 at rest and 4 liters with activity:    Your goal pulse oximetry is 88 - 92%.  If your pulse ox is <88 - rest for 5 minutes and take deep breaths through your nose with the oxygen in place.  Make sure your finger is warm (cold fingers will give falsely low readings).  After 5 minutes, recheck your pulse ox.  If your pulse ox continues to be below < 88% and you feel short of breath, rest, breathe through your nose and call your primary care physician or pulmonologist 24/7 (if after office hours the answering service will contact the on call physician who will call you back).  If you continue to feel excessively short of breath (much more than your normal breathing pattern), consider further evaluation in the emergency department - remember that a mask must be worn to enter any [facility] Emergency Department.  If your pulse ox continues to be below < 88% and you do not feel short of breath increase your oxygen flow by 1 liter at a time to achieve a reading between 88 and 92%.  If you are needing more than 2 liters higher than your normal flow for more than a few hours call your primary care physician or pulmonologist, even if you are not feeling short of breath.  If your pulse ox is >92% it is safe to turn down oxygen by 1 liter at a time to achieve a reading of 88-92%.

## 2025-02-23 NOTE — PROGRESS NOTES
Pulmonary Consultation   Bea Perez 74 y.o. female MRN: [id number]  2/25/2025      Assessment:  HFU/multifocal airspace disease  First presented with flulike symptoms/+ exposure to sick contact from her daughter  Chest CT with bilateral GGO's/consolidation at [facility]  Treated with ABX, noted elevated PCT  Also diuretics for Takotsubo cardiomyopathy  Improving, still requiring oxygen with exertion  Noted mild rhonchi/wheezing on exam, likely retained secretions  Only smoked few cigarettes per day for about 50 years, no emphysema/a formal diagnosis of COPD    Plan:  Ordered nebulizer/DuoNeb every 6 as needed  Follow-up in 6 to 8 weeks, to repeat 6-minute walk test/needs CT chest to ensure improvement of the pulmonary infiltrates  Follow-up with cardiology for cardiomyopathy/recent TTE showed improvement of EF to 45%      Return in about 6 weeks (around 4/8/2025).        History of Present Illness     New Consult for: HFU/      Background  74 y.o. female with a h/o anxiety/depression, acid reflux, HTN, dysphagia, Takotsubo cardiomyopathy, intermittent smoking    Hospitalized 2/11 until 2/24 acute hypoxic respiratory failure.  Suspect due to pulmonary edema/required BiPAP IV diuretics.  Echocardiogram showed EF of 15% suspected stress-induced cardiomyopathy.  Chest CT showed multifocal airspace disease, negative urine antigens strep pneumonia/Legionella, started on Solu-Medrol/azithromycin/ceftriaxone.  Noted elevated PCT at 1.01>> 0.52.  Discharged on home oxygen, to follow-up with pulmonary/cardiology.    She was discharged 5 days ago, feeling better, still using supplemental oxygen at 1 LPM at rest, up to 4 with exertion.  Taking Lasix 40 mg daily, continued to be independent in ADLs but still with residual cough/chest congestion improved with rest.    Denies prior hx asthma or COPD, for 50 years, smoked only few cigarettes per day quit in 2024.  Never been 1 pack every day smoker before.    Prior last illness,  "states that daughter had flulike symptoms, was cold/myalgia then she had similar issues including shortness of breath, cough/mucus production and decreased appetite.  Currently improved constitutional symptoms, still with residual cough and dyspnea on exertion.    Review of Systems  As per hpi, all other systems reviewed and were negative.        Studies:  Imaging and other studies: I have personally reviewed pertinent films in PACS  CT PE 2/11/2025-multifocal dense consolidation in both lobes/(consolidative opacities at the LLL, bronchial wall thickening at MARLI/lower lobes.  Small left/trace right pleural effusion.    Pulmonary function testing:       EKG, Pathology, and Other Studies: I have personally reviewed pertinent reports.          Historical Information   Past Medical History:   Diagnosis Date    Depression     Esophageal stricture     History of gastroesophageal reflux (GERD)     Iron deficiency anemia     Meniere disease      Past Surgical History:   Procedure Laterality Date    COLONOSCOPY  2012    FL LUMBAR PUNCTURE DIAGNOSTIC  12/19/2024    GALLBLADDER SURGERY      HYSTERECTOMY      OTHER SURGICAL HISTORY      Endoscopy of stricture 2010, 2012, 5/2014 - Esophageal ulcer; 7/2014 - Esophageal ulcer; 10/2015 (Dr. Jones) - Esophageal stricture; 10/2016; 11/2017 - Stricture     Family History   Problem Relation Age of Onset    Stroke Mother     Hypertension Mother     Emphysema Father     COPD Father          Medications/Allergies: Reviewed    Vitals: Blood pressure 110/60, pulse 95, temperature 98.2 °F (36.8 °C), temperature source Temporal, height 5' 2\" (1.575 m), weight 54 kg (119 lb), SpO2 92%. Body mass index is 21.77 kg/m². Oxygen Therapy  SpO2: 92 % (1 liter)      Physical Exam  Body mass index is 21.77 kg/m².   Gen: not in acute distress, thin  Neck/Eyes: supple, no JVD appreciated, PERRL  Ear: normal appearance, no significant hearing impairment  Nose:  normal nasal mucosa, no drainage  Chest: " "normal respiratory efforts, clear breath sounds bilaterally  CV: RRR, no murmurs appreciated, no edema  Abdomen: soft, non tender  Extremities:  No observed deformity   Neuro: AAO X3, no focal motor deficit         Labs:  Lab Results   Component Value Date    WBC 11.19 (H) 02/19/2025    HGB 10.8 (L) 02/19/2025    HCT 33.6 (L) 02/19/2025    MCV 93 02/19/2025     (H) 02/19/2025     Lab Results   Component Value Date    CALCIUM 8.4 02/20/2025    K 4.3 02/20/2025    CO2 34 (H) 02/20/2025     02/20/2025    BUN 26 (H) 02/20/2025    CREATININE 0.64 02/20/2025     No results found for: \"IGE\"  Lab Results   Component Value Date    ALT 25 02/12/2025    AST 44 (H) 02/12/2025    ALKPHOS 86 02/12/2025           Portions of the record may have been created with voice recognition software.  Occasional wrong word or \"sound a like\" substitutions may have occurred due to the inherent limitations of voice recognition software.  Read the chart carefully and recognize, using context, where substitutions have occurred    Cesar Quevedo M.D.  [facility]  "

## 2025-02-24 ENCOUNTER — TELEPHONE (OUTPATIENT)
Dept: CARDIOLOGY CLINIC | Facility: CLINIC | Age: 75
End: 2025-02-24

## 2025-02-24 RX ORDER — MIRTAZAPINE 15 MG/1
15 TABLET, FILM COATED ORAL
COMMUNITY
Start: 2024-09-23 | End: 2025-09-23

## 2025-02-24 RX ORDER — RAMELTEON 8 MG/1
8 TABLET ORAL
COMMUNITY
Start: 2025-01-23 | End: 2026-01-23

## 2025-02-24 RX ORDER — QUETIAPINE FUMARATE 50 MG/1
50 TABLET, FILM COATED ORAL
COMMUNITY
Start: 2024-09-13

## 2025-02-24 RX ORDER — CLONAZEPAM 2 MG/1
2 TABLET ORAL DAILY
COMMUNITY
Start: 2024-09-10

## 2025-02-24 RX ORDER — LEVOFLOXACIN 500 MG/1
500 TABLET, FILM COATED ORAL DAILY
COMMUNITY
Start: 2024-09-10

## 2025-02-24 RX ORDER — IBUPROFEN 800 MG/1
800 TABLET, FILM COATED ORAL EVERY 6 HOURS PRN
COMMUNITY
Start: 2024-11-26

## 2025-02-24 NOTE — TELEPHONE ENCOUNTER
Left message for patient to call back.  Pt has hospital follow up next week and we need to know if she has ever seen cardiologist before.

## 2025-02-25 ENCOUNTER — CONSULT (OUTPATIENT)
Dept: PULMONOLOGY | Facility: CLINIC | Age: 75
End: 2025-02-25
Payer: MEDICARE

## 2025-02-25 VITALS
DIASTOLIC BLOOD PRESSURE: 60 MMHG | TEMPERATURE: 98.2 F | HEIGHT: 62 IN | HEART RATE: 95 BPM | WEIGHT: 119 LBS | BODY MASS INDEX: 21.9 KG/M2 | OXYGEN SATURATION: 92 % | SYSTOLIC BLOOD PRESSURE: 110 MMHG

## 2025-02-25 DIAGNOSIS — J18.9 PNEUMONIA: ICD-10-CM

## 2025-02-25 PROCEDURE — 99204 OFFICE O/P NEW MOD 45 MIN: CPT | Performed by: INTERNAL MEDICINE

## 2025-02-25 RX ORDER — MOMETASONE FUROATE MONOHYDRATE 50 UG/1
2 SPRAY, METERED NASAL DAILY
COMMUNITY
Start: 2025-02-25

## 2025-02-25 RX ORDER — IPRATROPIUM BROMIDE AND ALBUTEROL SULFATE 2.5; .5 MG/3ML; MG/3ML
3 SOLUTION RESPIRATORY (INHALATION) EVERY 6 HOURS PRN
Qty: 360 ML | Refills: 5 | Status: SHIPPED | OUTPATIENT
Start: 2025-02-25 | End: 2025-03-27

## 2025-03-03 ENCOUNTER — TELEPHONE (OUTPATIENT)
Dept: CARDIOLOGY CLINIC | Facility: CLINIC | Age: 75
End: 2025-03-03

## 2025-03-05 ENCOUNTER — OFFICE VISIT (OUTPATIENT)
Dept: CARDIOLOGY CLINIC | Facility: CLINIC | Age: 75
End: 2025-03-05
Payer: MEDICARE

## 2025-03-05 VITALS
HEIGHT: 62 IN | HEART RATE: 83 BPM | BODY MASS INDEX: 22.08 KG/M2 | OXYGEN SATURATION: 99 % | SYSTOLIC BLOOD PRESSURE: 102 MMHG | TEMPERATURE: 96.8 F | DIASTOLIC BLOOD PRESSURE: 60 MMHG | WEIGHT: 120 LBS

## 2025-03-05 DIAGNOSIS — I51.81 TAKOTSUBO CARDIOMYOPATHY: Primary | ICD-10-CM

## 2025-03-05 DIAGNOSIS — E78.00 HYPERCHOLESTEROLEMIA: Chronic | ICD-10-CM

## 2025-03-05 DIAGNOSIS — I50.20 HEART FAILURE WITH REDUCED EJECTION FRACTION (HCC): ICD-10-CM

## 2025-03-05 DIAGNOSIS — I10 HYPERTENSION, ESSENTIAL: ICD-10-CM

## 2025-03-05 PROCEDURE — 99214 OFFICE O/P EST MOD 30 MIN: CPT | Performed by: NURSE PRACTITIONER

## 2025-03-05 RX ORDER — CARVEDILOL 3.12 MG/1
3.12 TABLET ORAL 2 TIMES DAILY WITH MEALS
Qty: 180 TABLET | Refills: 3 | Status: SHIPPED | OUTPATIENT
Start: 2025-03-05

## 2025-03-05 RX ORDER — FUROSEMIDE 40 MG/1
40 TABLET ORAL DAILY
Qty: 90 TABLET | Refills: 3 | Status: SHIPPED | OUTPATIENT
Start: 2025-03-05

## 2025-03-05 NOTE — ASSESSMENT & PLAN NOTE
History of hypertension  BP is borderline hypotensive  Continue carvedilol 3.125 mg BID.  Will hold lisinopril for now

## 2025-03-05 NOTE — PROGRESS NOTES
Cardiology Follow Up    Bea Perez  1950  [id number]  [facility] [place] [id number]  [address]  [facility] [address] [phone]    Bea presents for close follow up of Takotsubo cardiomyopathy and HFrEF    1. Takotsubo cardiomyopathy  Assessment & Plan:  Admitted with multifocal PNA 2/11/2025.  Echo 2/12/2025 shows LVEF 15% with apical akinesis consistent with stress-induced cardiomyopathy.  Started on GDMT.  Repeat limited echo 2/17 showed LVEF 45%.  Continue carvedilol 3.125 mg BID. Lisinopril d/c by PCP due to hypotension.  Continue Jardiance 10 mg daily.  Continue furosemide 40  mg daily for now, BMP/BNP this week.  Close follow up arranged with repeat echo within 3 months.  Orders:  -     Basic metabolic panel; Future  -     B-Type Natriuretic Peptide(BNP); Future  -     carvedilol (COREG) 3.125 mg tablet; Take 1 tablet (3.125 mg total) by mouth 2 (two) times a day with meals  -     Empagliflozin (Jardiance) 10 MG TABS tablet; Take 1 tablet (10 mg total) by mouth every morning  -     furosemide (LASIX) 40 mg tablet; Take 1 tablet (40 mg total) by mouth daily  -     Echo follow up/limited w/ contrast if indicated; Future; Expected date: 06/05/2025  2. Heart failure with reduced ejection fraction (HCC)  Assessment & Plan:  Wt Readings from Last 3 Encounters:   03/05/25 54.4 kg (120 lb)   02/25/25 54 kg (119 lb)   02/17/25 60.8 kg (134 lb)     Admitted 2/11 with multifocal pneumonia. Developed flash pulmonary edema and found with severely reduced EF and findings consistent with stress induced cardiomyopathy.  Started on GDMT. Currently euvolemic on furosemide 40 mg daily and Jardiance 10 mg daily.  BMP/BNP today.  Continue 2 g sodium diet, daily weights.  Reviewed s/s to report.  Will monitor closely.        3. Hypertension, essential  Assessment & Plan:  History of hypertension  BP is borderline hypotensive  Continue carvedilol 3.125 mg  BID.  Will hold lisinopril for now  4. Hypercholesterolemia  Assessment & Plan:  Goal LDL < 70  Not currently on statin therapy.  Monitor with upcoming labs     HPI  Bea has a past medical history of COPD with chronic cigarette smoking, GERD with esophageal stricture, anxiety and depression. Recently diagnosed with Takostubo cardiomyopathy in the setting if pneumonia and HFrEF.    She was admitted to [facility] 2/11-2/20/2025 when she presented from a local urgent care with shortness of breath and was found to have multi-focal pneumonia with dense consolidations in both lower lobes. Her hospital course was complicated by acute respiratory failure with flash pulmonary edema and hypotension requiring Levophed support. Echocardiogram revealed LVEF 15% with akinetic apex consistent with Takotsubo cardiomyopathy. She was evaluated by [facility] and started on carvedilol 3.125 mg BID, lisinopril 2.5 mg daily, and Jardiance 10 mg daily. Furosemide 40 mg daily was started prior to discharge. Limited echo on 2/17 showed interval improvement in her LVEF to 45%.     3/5/2025: Bea presents for close follow up accompanied by her spouse. She met with pulmonology on 2/25 at which time her respiratory status was improving. She was still requiring supplemental O2. She is receiving home health and getting daily weights and ankle measurements. Her weight is holding stable at 120-122 pounds on her home scale. She notes steady improvement since her hospitalization. O2 requirements are reduced to 1 L at rest and 3 L with exertion. She denies any chest pain. She has no edema. She notes positional lightheadedness. Her PCP stopped the lisinopril due to hypotension.     Medical Problems       Problem List       Takotsubo cardiomyopathy    Hypertension, essential    Hypercholesterolemia (Chronic)    Gastro-esophageal reflux disease with esophagitis (Chronic)    Iron deficiency anemia secondary to inadequate dietary iron intake  (Chronic)    Generalized anxiety disorder (Chronic)    Seasonal allergic rhinitis due to pollen (Chronic)    Pharyngoesophageal dysphagia (Chronic)    Current moderate episode of major depressive disorder without prior episode (HCC)    Bronchitis    Multifocal pneumonia    Acute hypoxic respiratory failure (HCC)    Sepsis with acute hypoxic respiratory failure (HCC)    Anxiety    Elevated troponin    Pneumonia        Past Medical History:   Diagnosis Date    Depression     Esophageal stricture     History of gastroesophageal reflux (GERD)     Iron deficiency anemia     Meniere disease      Social History     Socioeconomic History    Marital status: /Civil Union     Spouse name: Not on file    Number of children: Not on file    Years of education: Not on file    Highest education level: Not on file   Occupational History    Not on file   Tobacco Use    Smoking status: Former     Types: Cigarettes    Smokeless tobacco: Never   Vaping Use    Vaping status: Never Used   Substance and Sexual Activity    Alcohol use: Not Currently     Comment: Denies alcohol use - As per [employer]     Drug use: Not Currently    Sexual activity: Not on file   Other Topics Concern    Not on file   Social History Narrative    Not on file     Social Drivers of Health     Financial Resource Strain: Low Risk  (12/17/2024)    Received from [facility]    Overall Financial Resource Strain (CARDIA)     Difficulty of Paying Living Expenses: Not hard at all   Food Insecurity: No Food Insecurity (2/11/2025)    Nursing - Inadequate Food Risk Classification     Worried About Running Out of Food in the Last Year: Not on file     Ran Out of Food in the Last Year: Not on file     Ran Out of Food in the Last Year: Never true   Transportation Needs: No Transportation Needs (2/11/2025)    Nursing - Transportation Risk Classification     Lack of Transportation: Not on file     Lack of Transportation: No   Physical Activity: Not on file    Stress: No Stress Concern Present (2024)    Received from [facility]    [employer] Levels of Occupational Health - Occupational Stress Questionnaire     Feeling of Stress : Only a little   Social Connections: Unknown (2024)    Received from [employer]    Social [employer]Link     How often do you feel lonely or isolated from those around you? (Adult - for ages 18 years and over): Not on file   Intimate Partner Violence: Unknown (2025)    Nursing IPS     Feels Physically and Emotionally Safe: Not on file     Physically Hurt by Someone: Not on file     Humiliated or Emotionally Abused by Someone: Not on file     Physically Hurt by Someone: No     Hurt or Threatened by Someone: No   Housing Stability: Unknown (2025)    Nursing: Inadequate Housing Risk Classification     Has Housing: Not on file     Worried About Losing Housing: Not on file     Unable to Get Utilities: Not on file     Unable to Pay for Housing in the Last Year: No     Has Housin      Family History   Problem Relation Age of Onset    Stroke Mother     Hypertension Mother     Emphysema Father     COPD Father      Past Surgical History:   Procedure Laterality Date    COLONOSCOPY      FL LUMBAR PUNCTURE DIAGNOSTIC  2024    GALLBLADDER SURGERY      HYSTERECTOMY      OTHER SURGICAL HISTORY      Endoscopy of stricture , , 2014 - Esophageal ulcer; 2014 - Esophageal ulcer; 10/2015 (Dr. Jones) - Esophageal stricture; 10/2016; 2017 - Stricture       Current Outpatient Medications:     carvedilol (COREG) 3.125 mg tablet, Take 1 tablet (3.125 mg total) by mouth 2 (two) times a day with meals, Disp: 180 tablet, Rfl: 3    Cholecalciferol 25 MCG (1000 UT) CHEW, Chew 1 tablet daily, Disp: , Rfl:     clonazePAM (KlonoPIN) 2 mg tablet, Take 2 mg by mouth daily, Disp: , Rfl:     DULoxetine (CYMBALTA) 30 mg delayed release capsule, Take 1 capsule by mouth in the morning, Disp: , Rfl:     Empagliflozin  (Jardiance) 10 MG TABS tablet, Take 1 tablet (10 mg total) by mouth every morning, Disp: 30 tablet, Rfl: 11    furosemide (LASIX) 40 mg tablet, Take 1 tablet (40 mg total) by mouth daily, Disp: 90 tablet, Rfl: 3    guaiFENesin (MUCINEX PO), Take by mouth, Disp: , Rfl:     ibuprofen (MOTRIN) 800 mg tablet, Take 800 mg by mouth every 6 (six) hours as needed, Disp: , Rfl:     ipratropium-albuterol (DUO-NEB) 0.5-2.5 mg/3 mL nebulizer solution, Take 3 mL by nebulization every 6 (six) hours as needed for wheezing or shortness of breath, Disp: 360 mL, Rfl: 5    mirtazapine (REMERON) 15 mg tablet, Take 15 mg by mouth daily at bedtime, Disp: , Rfl:     mometasone (NASONEX) 50 mcg/act nasal spray, 2 sprays into each nostril daily, Disp: , Rfl:     QUEtiapine (SEROquel) 50 mg tablet, Take 50 mg by mouth daily at bedtime, Disp: , Rfl:     divalproex sodium (DEPAKOTE SPRINKLE) 125 MG capsule, Take 250 mg by mouth 2 (two) times a day, Disp: , Rfl:     levofloxacin (LEVAQUIN) 500 mg tablet, Take 500 mg by mouth daily (Patient not taking: Reported on 3/5/2025), Disp: , Rfl:     Magnesium 200 MG CHEW, Chew 200 mg daily at bedtime (Patient not taking: Reported on 3/5/2025), Disp: , Rfl:     OLANZapine (ZyPREXA) 7.5 mg tablet, Take 7.5 mg by mouth (Patient not taking: Reported on 3/5/2025), Disp: , Rfl:     ramelteon (ROZEREM) 8 mg tablet, Take 8 mg by mouth daily at bedtime (Patient not taking: Reported on 3/5/2025), Disp: , Rfl:   No Known Allergies    Labs:     Chemistry        Component Value Date/Time    K 4.3 02/20/2025 0604    K 4.1 12/27/2024 0453     02/20/2025 0604     12/27/2024 0453    CO2 34 (H) 02/20/2025 0604    CO2 29 12/27/2024 0453    BUN 26 (H) 02/20/2025 0604    BUN 13 12/27/2024 0453    CREATININE 0.64 02/20/2025 0604    CREATININE 0.59 12/27/2024 0453        Component Value Date/Time    CALCIUM 8.4 02/20/2025 0604    CALCIUM 9.1 12/27/2024 0453    ALKPHOS 86 02/12/2025 1931    ALKPHOS 76 12/27/2024  "0453    AST 44 (H) 02/12/2025 1931    AST 15 12/27/2024 0453    ALT 25 02/12/2025 1931    ALT 8 12/27/2024 0453        Lipid panel    Imaging:   Echo limited 2/17/2025    Left Ventricle: The left ventricular ejection fraction is 45% by visual estimation. Systolic function is mildly reduced.   The following segments are akinetic: mid anteroseptal, apical anterior, apical inferior, apical lateral and apex.   The following segments are hypokinetic: mid anterolateral and apical septal.   All other segments are normal.       Echo complete 2/13/2025    Left Ventricle: Left ventricular cavity size is moderately dilated. Mild basal septal hypertrophy is noted. The left ventricular ejection fraction is 15%. Systolic function is severely reduced. Only the basal myocardium is moving.  The entire mid to apical myocardium is akinetic and aneurysmal, highly suggestive of Takotsubo cardiomyopathy.  However, prior extensive infarct cannot be excluded.  Diastolic function is mildly abnormal, consistent with grade I (abnormal) relaxation.   Left Atrium: The atrium is mildly dilated.   Mitral Valve: There is mild regurgitation.   Tricuspid Valve: There is mild regurgitation.       ECG 2/12/2025: ST with LVH and anteroseptal infarct    Review of Systems   Constitutional: Positive for malaise/fatigue.   HENT: Negative.     Cardiovascular:  Positive for dyspnea on exertion. Negative for chest pain, irregular heartbeat, leg swelling, near-syncope, orthopnea and palpitations.   Respiratory:  Positive for cough. Negative for snoring.    Endocrine: Negative.    Skin: Negative.    Musculoskeletal: Negative.    Gastrointestinal: Negative.    Genitourinary: Negative.    Neurological: Negative.    Psychiatric/Behavioral: Negative.         Vitals:    03/05/25 1405   BP: 102/60   Pulse: 83   Temp: (!) 96.8 °F (36 °C)   SpO2: 99%     Vitals:    03/05/25 1405   Weight: 54.4 kg (120 lb)     Height: 5' 2\" (157.5 cm)   Body mass index is 21.95 " kg/m².    Physical Exam  Vitals and nursing note reviewed.   Constitutional:       General: She is not in acute distress.     Appearance: She is well-developed. She is not diaphoretic.   HENT:      Head: Normocephalic and atraumatic.   Neck:      Thyroid: No thyromegaly.      Vascular: No carotid bruit or JVD.   Cardiovascular:      Rate and Rhythm: Normal rate and regular rhythm.      Pulses: Intact distal pulses.           Radial pulses are 2+ on the right side and 2+ on the left side.      Heart sounds: Normal heart sounds, S1 normal and S2 normal. No murmur heard.     Comments: No edema  Pulmonary:      Effort: Pulmonary effort is normal.      Breath sounds: Normal breath sounds.      Comments: 3 L NC of supplemental O2  Abdominal:      General: There is no distension.      Palpations: Abdomen is soft.      Tenderness: There is no abdominal tenderness.   Musculoskeletal:         General: Normal range of motion.      Cervical back: Normal range of motion and neck supple.   Lymphadenopathy:      Cervical: No cervical adenopathy.   Skin:     General: Skin is warm and dry.   Neurological:      Mental Status: She is alert and oriented to person, place, and time.      Cranial Nerves: No cranial nerve deficit.   Psychiatric:         Behavior: Behavior normal.

## 2025-03-05 NOTE — ASSESSMENT & PLAN NOTE
Admitted with multifocal PNA 2/11/2025.  Echo 2/12/2025 shows LVEF 15% with apical akinesis consistent with stress-induced cardiomyopathy.  Started on GDMT.  Repeat limited echo 2/17 showed LVEF 45%.  Continue carvedilol 3.125 mg BID. Lisinopril d/c by PCP due to hypotension.  Continue Jardiance 10 mg daily.  Continue furosemide 40  mg daily for now, BMP/BNP this week.  Close follow up arranged with repeat echo within 3 months.

## 2025-03-05 NOTE — ASSESSMENT & PLAN NOTE
Wt Readings from Last 3 Encounters:   03/05/25 54.4 kg (120 lb)   02/25/25 54 kg (119 lb)   02/17/25 60.8 kg (134 lb)     Admitted 2/11 with multifocal pneumonia. Developed flash pulmonary edema and found with severely reduced EF and findings consistent with stress induced cardiomyopathy.  Started on GDMT. Currently euvolemic on furosemide 40 mg daily and Jardiance 10 mg daily.  BMP/BNP today.  Continue 2 g sodium diet, daily weights.  Reviewed s/s to report.  Will monitor closely.

## 2025-03-05 NOTE — PATIENT INSTRUCTIONS
Lab work this week to monitor kidney function. Based on results we will let you know about adjusting the water pill.  Stay off lisinopril for now, but continue carvedilol.  Update echo in 3months.

## 2025-03-07 ENCOUNTER — RESULTS FOLLOW-UP (OUTPATIENT)
Dept: CARDIOLOGY CLINIC | Facility: CLINIC | Age: 75
End: 2025-03-07

## 2025-03-07 ENCOUNTER — APPOINTMENT (OUTPATIENT)
Dept: LAB | Facility: HOSPITAL | Age: 75
End: 2025-03-07
Payer: MEDICARE

## 2025-03-07 DIAGNOSIS — I51.81 TAKOTSUBO CARDIOMYOPATHY: ICD-10-CM

## 2025-03-07 LAB
ANION GAP SERPL CALCULATED.3IONS-SCNC: 7 MMOL/L (ref 4–13)
BNP SERPL-MCNC: 200 PG/ML (ref 0–100)
BUN SERPL-MCNC: 16 MG/DL (ref 5–25)
CALCIUM SERPL-MCNC: 9.3 MG/DL (ref 8.4–10.2)
CHLORIDE SERPL-SCNC: 101 MMOL/L (ref 96–108)
CO2 SERPL-SCNC: 31 MMOL/L (ref 21–32)
CREAT SERPL-MCNC: 0.7 MG/DL (ref 0.6–1.3)
GFR SERPL CREATININE-BSD FRML MDRD: 85 ML/MIN/1.73SQ M
GLUCOSE P FAST SERPL-MCNC: 106 MG/DL (ref 65–99)
POTASSIUM SERPL-SCNC: 4.4 MMOL/L (ref 3.5–5.3)
SODIUM SERPL-SCNC: 139 MMOL/L (ref 135–147)

## 2025-03-07 PROCEDURE — 36415 COLL VENOUS BLD VENIPUNCTURE: CPT

## 2025-03-07 PROCEDURE — 83880 ASSAY OF NATRIURETIC PEPTIDE: CPT

## 2025-03-07 PROCEDURE — 80048 BASIC METABOLIC PNL TOTAL CA: CPT

## 2025-03-13 PROBLEM — J96.01 SEPSIS WITH ACUTE HYPOXIC RESPIRATORY FAILURE (HCC): Status: RESOLVED | Noted: 2025-02-11 | Resolved: 2025-03-13

## 2025-03-13 PROBLEM — R65.20 SEPSIS WITH ACUTE HYPOXIC RESPIRATORY FAILURE (HCC): Status: RESOLVED | Noted: 2025-02-11 | Resolved: 2025-03-13

## 2025-03-13 PROBLEM — A41.9 SEPSIS WITH ACUTE HYPOXIC RESPIRATORY FAILURE (HCC): Status: RESOLVED | Noted: 2025-02-11 | Resolved: 2025-03-13

## 2025-03-13 PROBLEM — J18.9 MULTIFOCAL PNEUMONIA: Status: RESOLVED | Noted: 2025-02-11 | Resolved: 2025-03-13

## 2025-03-20 ENCOUNTER — NURSE TRIAGE (OUTPATIENT)
Age: 75
End: 2025-03-20

## 2025-03-20 NOTE — TELEPHONE ENCOUNTER
Received a call from Viola the daughter, not currently on HIPAA fom but Bea gave a verbal consent to speak to her daughter.     Bea had seen her PCP at , Dr Silva today. Recommendation was to restart the lisinopril with     Daughter would like to discuss this with Deysi due to Deysi recently stopping medication due to low BP readings.     Daughter wants to make sure every one is on the same page and doing exactly what is needed for her mom.     Today BP at office was low today, 86/52 HR 70    C/b for daughter/Viola [phone]

## 2025-03-20 NOTE — TELEPHONE ENCOUNTER
I understand that Dr. Silva her PCP is trying to optimize medications that help reduced heart function, however, with blood pressure being in the 80's systolic it is not safe to take additional blood pressure lowering medication. I would not take the lisinopril unless her blood pressure is > 110/60

## 2025-03-21 NOTE — TELEPHONE ENCOUNTER
is aware and understanding. States that PCP sent over ten MG but wants pt taking 2.5 mg. Called PCP office and they state that he sent a new one this AM.  is aware.

## 2025-03-22 PROBLEM — J18.9 PNEUMONIA: Status: RESOLVED | Noted: 2025-02-20 | Resolved: 2025-03-22

## 2025-03-28 ENCOUNTER — TELEPHONE (OUTPATIENT)
Age: 75
End: 2025-03-28

## 2025-04-09 RX ORDER — LISINOPRIL 2.5 MG/1
2.5 TABLET ORAL DAILY
COMMUNITY
Start: 2025-03-21 | End: 2026-03-21

## 2025-04-10 ENCOUNTER — OFFICE VISIT (OUTPATIENT)
Dept: PULMONOLOGY | Facility: CLINIC | Age: 75
End: 2025-04-10
Payer: MEDICARE

## 2025-04-10 VITALS
HEIGHT: 62 IN | DIASTOLIC BLOOD PRESSURE: 74 MMHG | BODY MASS INDEX: 23.55 KG/M2 | OXYGEN SATURATION: 98 % | HEART RATE: 83 BPM | TEMPERATURE: 97.2 F | SYSTOLIC BLOOD PRESSURE: 118 MMHG | WEIGHT: 128 LBS

## 2025-04-10 DIAGNOSIS — J18.9 PNEUMONIA DUE TO INFECTIOUS ORGANISM, UNSPECIFIED LATERALITY, UNSPECIFIED PART OF LUNG: Primary | ICD-10-CM

## 2025-04-10 PROCEDURE — 94618 PULMONARY STRESS TESTING: CPT | Performed by: INTERNAL MEDICINE

## 2025-04-10 PROCEDURE — 99213 OFFICE O/P EST LOW 20 MIN: CPT | Performed by: INTERNAL MEDICINE

## 2025-04-10 RX ORDER — IPRATROPIUM BROMIDE AND ALBUTEROL SULFATE 2.5; .5 MG/3ML; MG/3ML
SOLUTION RESPIRATORY (INHALATION)
COMMUNITY
Start: 2025-03-26

## 2025-04-10 NOTE — PROGRESS NOTES
"Pulmonary Follow Up Note   Bea Perez 74 y.o. female MRN: [id number]  4/10/2025    Assessment:  Pneumonia  Treated as IP in 2/2025, antibiotics  First presented with flulike symptoms/+ exposure to sick contact from her daughter  Chest CT with bilateral GGO's/consolidation at LLL  Improving, now with complete resolution of the symptoms  No rhonchi/wheezing on exam as last visit  No background asthma/or COPD or emphysema    Plan:  6-minute walk test today showed no exertional hypoxemia  Discontinue supplemental oxygen  Will check CT chest to ensure resolution of the infiltrates  No further follow-up needed if CT chest is normal    Former tobacco abuse  For 15 years smoked no more than few cigarettes per day  Below threshold for lung cancer screening  Remains abstinent since 2024    Return if symptoms worsen or fail to improve.    History of Present Illness     Follow up for: Pneumonia    Background  As per initial consult notes    \"74 y.o. female with a h/o anxiety/depression, acid reflux, HTN, dysphagia, Takotsubo cardiomyopathy, intermittent smoking     Hospitalized 2/11 until 2/24 acute hypoxic respiratory failure.  Suspect due to pulmonary edema/required BiPAP IV diuretics.  Echocardiogram showed EF of 15% suspected stress-induced cardiomyopathy.  Chest CT showed multifocal airspace disease, negative urine antigens strep pneumonia/Legionella, started on Solu-Medrol/azithromycin/ceftriaxone.  Noted elevated PCT at 1.01>> 0.52.  Discharged on home oxygen, to follow-up with pulmonary/cardiology.     She was discharged 5 days ago, feeling better, still using supplemental oxygen at 1 LPM at rest, up to 4 with exertion.  Taking Lasix 40 mg daily, continued to be independent in ADLs but still with residual cough/chest congestion improved with rest.     Denies prior hx asthma or COPD, for 50 years, smoked only few cigarettes per day quit in 2024.  Never been 1 pack every day smoker before.     Prior last illness, " "states that daughter had flulike symptoms, was cold/myalgia then she had similar issues including shortness of breath, cough/mucus production and decreased appetite.  Currently improved constitutional symptoms, still with residual cough and dyspnea on exertion.\"         Interval History  Since last seen, continued to feel better/improving exercise capacity.  No cough, chest congestion, wheezing or shortness of breath.  Does not use supplemental oxygen.  Did not go back to smoking.      Review of Systems  As per hpi, all other systems reviewed and were negative    Studies:  Imaging and other studies: I have personally reviewed pertinent films in PACS  CT PE 2025-multifocal dense consolidation in both lobes/(consolidative opacities at the LLL, bronchial wall thickening at MARLI/lower lobes.  Small left/trace right pleural effusion.     Pulmonary function testin-minute walk test/10/2025-baseline SpO2 97% on room air, heart rate 98.  Able to walk 240 m in 6-minute, lowest SpO2 96% on room air, maximal heart rate 100    6mwt 4/10/2025 - baseline SpO2 97% on RA, HR 90. Able to walk for 6 min with no stop 240 m. Lowest SpO2 96% on RA, max     EKG, Pathology, and Other Studies: I have personally reviewed pertinent reports.        Past medical, surgical, social and family histories reviewed.      Medications/Allergies: Reviewed      Vitals: Blood pressure 118/74, pulse 83, temperature (!) 97.2 °F (36.2 °C), temperature source Temporal, height 5' 2\" (1.575 m), weight 58.1 kg (128 lb), SpO2 98%. Body mass index is 23.41 kg/m². Oxygen Therapy  SpO2: 98 %      Physical Exam  Body mass index is 23.41 kg/m².   Gen: not in acute distress,   Neck/Eyes: supple, no JVD appreciated, PERRL  Ear: normal appearance, no significant hearing impairment  Nose:  normal nasal mucosa, no drainage  Mouth:  unremarkable/normal appearance of lips, teeth and gums  Oropharynx: mucosa is moist, no focal lesions or erythema  Salivary " "glands: soft nontender  Chest: normal respiratory efforts, clear breath sounds bilaterally  CV: RRR, no murmurs appreciated, no edema  Abdomen: soft, non tender  Extremities:  No observed deformity   Skin: unremarkable  Neuro: AAO X3, no focal motor deficit        Labs:  Lab Results   Component Value Date    WBC 11.19 (H) 02/19/2025    HGB 10.8 (L) 02/19/2025    HCT 33.6 (L) 02/19/2025    MCV 93 02/19/2025     (H) 02/19/2025     Lab Results   Component Value Date    CALCIUM 9.3 03/07/2025    K 4.4 03/07/2025    CO2 31 03/07/2025     03/07/2025    BUN 16 03/07/2025    CREATININE 0.70 03/07/2025     No results found for: \"IGE\"  Lab Results   Component Value Date    ALT 25 02/12/2025    AST 44 (H) 02/12/2025    ALKPHOS 86 02/12/2025           Portions of the record may have been created with voice recognition software.  Occasional wrong word or \"sound a like\" substitutions may have occurred due to the inherent limitations of voice recognition software.  Read the chart carefully and recognize, using context, where substitutions have occurred    Cesar Quevedo M.D.  [facility]  "

## 2025-04-18 DIAGNOSIS — I51.81 TAKOTSUBO CARDIOMYOPATHY: ICD-10-CM

## 2025-05-10 PROBLEM — J18.9 PNEUMONIA DUE TO INFECTIOUS ORGANISM, UNSPECIFIED LATERALITY, UNSPECIFIED PART OF LUNG: Status: RESOLVED | Noted: 2025-04-10 | Resolved: 2025-05-10

## 2025-05-23 ENCOUNTER — HOSPITAL ENCOUNTER (OUTPATIENT)
Dept: NON INVASIVE DIAGNOSTICS | Facility: HOSPITAL | Age: 75
Discharge: HOME | End: 2025-05-23
Payer: MEDICARE

## 2025-05-23 ENCOUNTER — HOSPITAL ENCOUNTER (OUTPATIENT)
Dept: CT IMAGING | Facility: HOSPITAL | Age: 75
End: 2025-05-23
Attending: INTERNAL MEDICINE
Payer: MEDICARE

## 2025-05-23 VITALS
DIASTOLIC BLOOD PRESSURE: 74 MMHG | WEIGHT: 128 LBS | HEART RATE: 67 BPM | BODY MASS INDEX: 23.55 KG/M2 | HEIGHT: 62 IN | SYSTOLIC BLOOD PRESSURE: 118 MMHG

## 2025-05-23 DIAGNOSIS — I51.81 TAKOTSUBO CARDIOMYOPATHY: ICD-10-CM

## 2025-05-23 DIAGNOSIS — J18.9 PNEUMONIA DUE TO INFECTIOUS ORGANISM, UNSPECIFIED LATERALITY, UNSPECIFIED PART OF LUNG: ICD-10-CM

## 2025-05-23 LAB
AV LVOT MEAN GRADIENT: 4 MMHG
AV LVOT PEAK GRADIENT: 5 MMHG
BSA FOR ECHO PROCEDURE: 1.58 M2
DOP CALC LVOT PEAK VEL VTI: 22.74 CM
DOP CALC LVOT PEAK VEL: 1.17 M/S
LV EF US.2D.A4C+ESTIMATED: 66 %
SL CV LV EF: 65
TR MAX PG: 21 MMHG
TR PEAK VELOCITY: 2.3 M/S
TRICUSPID VALVE PEAK REGURGITATION VELOCITY: 2.26 M/S

## 2025-05-23 PROCEDURE — 71250 CT THORAX DX C-: CPT

## 2025-05-23 PROCEDURE — 93308 TTE F-UP OR LMTD: CPT

## 2025-05-23 PROCEDURE — 93321 DOPPLER ECHO F-UP/LMTD STD: CPT

## 2025-05-23 PROCEDURE — 93325 DOPPLER ECHO COLOR FLOW MAPG: CPT

## 2025-06-02 ENCOUNTER — RESULTS FOLLOW-UP (OUTPATIENT)
Dept: PULMONOLOGY | Facility: CLINIC | Age: 75
End: 2025-06-02

## 2025-06-02 DIAGNOSIS — I51.81 TAKOTSUBO CARDIOMYOPATHY: ICD-10-CM

## 2025-06-02 RX ORDER — KETOROLAC TROMETHAMINE 5 MG/ML
1 SOLUTION OPHTHALMIC 4 TIMES DAILY PRN
COMMUNITY
Start: 2025-04-22 | End: 2025-06-09 | Stop reason: ALTCHOICE

## 2025-06-02 RX ORDER — FUROSEMIDE 40 MG/1
40 TABLET ORAL DAILY
Qty: 90 TABLET | Refills: 3 | Status: SHIPPED | OUTPATIENT
Start: 2025-06-02 | End: 2025-06-09 | Stop reason: SDUPTHER

## 2025-06-02 RX ORDER — CARVEDILOL 3.12 MG/1
3.12 TABLET ORAL 2 TIMES DAILY WITH MEALS
Qty: 180 TABLET | Refills: 3 | Status: SHIPPED | OUTPATIENT
Start: 2025-06-02

## 2025-06-02 NOTE — TELEPHONE ENCOUNTER
----- Message from Cesar Alvarez MD sent at 6/2/2025  3:05 PM EDT -----  CT chest showed improvement of the pneumonia    Pls notify the pt    ----- Message -----  From: Giuseppe, Radiology Results In  Sent: 6/1/2025   5:40 AM EDT  To: Cesar Alvarez MD

## 2025-06-06 NOTE — PROGRESS NOTES
Cardiology Follow Up    Bea Perez  1950  [id number]  [facility] [place] [address]  [address]  [place] [address] [phone]    Bea presents for close follow up of Takotsubo cardiomyopathy and HFrEF     1. Takotsubo cardiomyopathy  Assessment & Plan:  Admitted with multifocal PNA 2/11/2025.  Echo 2/12/2025 shows LVEF 15% with apical akinesis consistent with stress-induced cardiomyopathy.  Started on GDMT.  Repeat limited echo 2/17 showed LVEF 45%.  Continue carvedilol 3.125 mg BID. Lisinopril d/c by PCP due to hypotension.  Continue Jardiance 10 mg daily.  Repeat echo 5/23/2025 shows LVEF 65% with no obvious wall motion abnormality.  Reviewed rationale for continuing Coreg and Jardiance.  Will reduce diuretic as she is not in heart failure.  Orders:  -     furosemide (LASIX) 20 mg tablet; Take 1 tablet (20 mg total) by mouth daily  2. Hypertension, essential  Assessment & Plan:  History of hypertension  BP is borderline hypotensive  Continue carvedilol 3.125 mg BID. Will monitor with reducing furosemide dose to 20 mg daily  3. Heart failure with recovered ejection fraction (HFrecEF) ([facility])  Assessment & Plan:  Wt Readings from Last 3 Encounters:   06/09/25 59.1 kg (130 lb 6.4 oz)   05/23/25 58.1 kg (128 lb)   04/10/25 58.1 kg (128 lb)     Admitted 2/11 with multifocal pneumonia. Developed flash pulmonary edema and found with severely reduced EF and findings consistent with stress induced cardiomyopathy.  Started on GDMT. Currently euvolemic on furosemide 40 mg daily and Jardiance 10 mg daily.  OK to reduce furosemide to 20 mg daily and will monitor closely. If stable can reduce to PRN dosing.   Continue 2 g sodium diet, daily weights.  Reviewed s/s to report.  Hold Jardiance for 4 days prior to surgical procedure.  4. Hypercholesterolemia  Assessment & Plan:  Goal LDL < 70  Not currently on statin therapy.  Has repeat labs through PCP to  complete     HPI  Bea has a past medical history of COPD with chronic cigarette smoking, GERD with esophageal stricture, anxiety and depression. Recently diagnosed with Takostubo cardiomyopathy in the setting if pneumonia and HFrEF.     She was admitted to [facility] 2/11-2/20/2025 when she presented from a local urgent care with shortness of breath and was found to have multi-focal pneumonia with dense consolidations in both lower lobes. Her hospital course was complicated by acute respiratory failure with flash pulmonary edema and hypotension requiring Levophed support. Echocardiogram revealed LVEF 15% with akinetic apex consistent with Takotsubo cardiomyopathy. She was evaluated by [facility] and started on carvedilol 3.125 mg BID, lisinopril 2.5 mg daily, and Jardiance 10 mg daily. Furosemide 40 mg daily was started prior to discharge. Limited echo on 2/17 showed interval improvement in her LVEF to 45%.     She followed up on 3/5/2025 at which time she was making steady improvement. Lisinopril had been discontinued due to hypotension.     6/6/2025: Bea presents for follow up accompanied by her spouse. She is doing very well. Repeat echo shows normalization of her LVEF. Her breathing is excellent. Not requiring supplemental O2. No chest pain, shortness of breath, or edema. Her weight is stable. She questions if she still needs to take all the medications. She is scheduled for updated blood work through her PCP    Medical Problems       Problem List       Takotsubo cardiomyopathy    Hypertension, essential    Hypercholesterolemia (Chronic)    Gastro-esophageal reflux disease with esophagitis (Chronic)    Iron deficiency anemia secondary to inadequate dietary iron intake (Chronic)    Generalized anxiety disorder (Chronic)    Seasonal allergic rhinitis due to pollen (Chronic)    Pharyngoesophageal dysphagia (Chronic)    Current moderate episode of major depressive disorder without prior episode (HCC)     Bronchitis    Acute hypoxic respiratory failure (HCC)    Anxiety    Elevated troponin    Heart failure with reduced ejection fraction (HCC)        Past Medical History[1]  Social History     Socioeconomic History    Marital status: /Civil Union     Spouse name: Not on file    Number of children: Not on file    Years of education: Not on file    Highest education level: Not on file   Occupational History    Not on file   Tobacco Use    Smoking status: Former     Types: Cigarettes    Smokeless tobacco: Never   Vaping Use    Vaping status: Never Used   Substance and Sexual Activity    Alcohol use: Not Currently     Comment: Denies alcohol use - As per [employer]     Drug use: Not Currently    Sexual activity: Not on file   Other Topics Concern    Not on file   Social History Narrative    Not on file     Social Drivers of Health     Financial Resource Strain: Low Risk  (12/17/2024)    Received from [facility]    Overall Financial Resource Strain (CARDIA)     Difficulty of Paying Living Expenses: Not hard at all   Food Insecurity: No Food Insecurity (2/11/2025)    Nursing - Inadequate Food Risk Classification     Worried About Running Out of Food in the Last Year: Not on file     Ran Out of Food in the Last Year: Not on file     Ran Out of Food in the Last Year: Never true   Transportation Needs: No Transportation Needs (2/11/2025)    Nursing - Transportation Risk Classification     Lack of Transportation: Not on file     Lack of Transportation: No   Physical Activity: Not on file   Stress: No Stress Concern Present (6/11/2024)    Received from [facility]    [place] [employer] - Occupational Stress Questionnaire     Feeling of Stress : Only a little   Social Connections: Unknown (6/18/2024)    Received from [facility]    Social Connections     How often do you feel lonely or isolated from those around you? (Adult - for ages 18 years and over): Not on file    Intimate Partner Violence: Unknown (2025)    Nursing IPS     Feels Physically and Emotionally Safe: Not on file     Physically Hurt by Someone: Not on file     Humiliated or Emotionally Abused by Someone: Not on file     Physically Hurt by Someone: No     Hurt or Threatened by Someone: No   Housing Stability: Unknown (2025)    Nursing: Inadequate Housing Risk Classification     Has Housing: Not on file     Worried About Losing Housing: Not on file     Unable to Get Utilities: Not on file     Unable to Pay for Housing in the Last Year: No     Has Housin      Family History[2]  Past Surgical History[3]  Current Medications[4]  No Known Allergies    Labs:     Chemistry        Component Value Date/Time    K 4.4 2025 1027    K 4.1 2024 0453     2025 1027     2024 0453    CO2 31 2025 1027    CO2 29 2024 0453    BUN 16 2025 1027    BUN 13 2024 0453    CREATININE 0.70 2025 1027    CREATININE 0.59 2024 0453        Component Value Date/Time    CALCIUM 9.3 2025 1027    CALCIUM 9.1 2024 0453    ALKPHOS 86 2025 193    ALKPHOS 76 2024 0453    AST 44 (H) 2025 1931    AST 15 2024 0453    ALT 25 2025 1931    ALT 8 2024 0453          Echo follow up 2025    This is a limited echo for LV function assessment only.   Left Ventricle: Left ventricular cavity size is normal. Moderate focal basal septal hypertrophy is noted. The left ventricular ejection fraction is 65%. Systolic function is normal. Wall motion is normal.   Compared to 2025 study report, LV function has normalized now.   Limited imaging showed aortic valve calcification with no more than minimal aortic stenosis.     Echo limited 2025    Left Ventricle: The left ventricular ejection fraction is 45% by visual estimation. Systolic function is mildly reduced.   The following segments are akinetic: mid anteroseptal, apical  "anterior, apical inferior, apical lateral and apex.   The following segments are hypokinetic: mid anterolateral and apical septal.   All other segments are normal.        Echo complete 2/13/2025    Left Ventricle: Left ventricular cavity size is moderately dilated. Mild basal septal hypertrophy is noted. The left ventricular ejection fraction is 15%. Systolic function is severely reduced. Only the basal myocardium is moving.  The entire mid to apical myocardium is akinetic and aneurysmal, highly suggestive of Takotsubo cardiomyopathy.  However, prior extensive infarct cannot be excluded.  Diastolic function is mildly abnormal, consistent with grade I (abnormal) relaxation.   Left Atrium: The atrium is mildly dilated.   Mitral Valve: There is mild regurgitation.   Tricuspid Valve: There is mild regurgitation.        ECG 2/12/2025: ST with LVH and anteroseptal infarct    Review of Systems   Constitutional: Negative.   HENT: Negative.     Cardiovascular:  Negative for chest pain, dyspnea on exertion, irregular heartbeat, leg swelling, near-syncope, orthopnea and palpitations.   Respiratory:  Negative for cough and snoring.    Endocrine: Negative.    Skin: Negative.    Musculoskeletal: Negative.    Gastrointestinal: Negative.    Genitourinary: Negative.    Neurological: Negative.    Psychiatric/Behavioral: Negative.         Vitals:    06/09/25 1059   BP: 112/62   Pulse: 73   Temp: 97.7 °F (36.5 °C)   SpO2: 98%     Vitals:    06/09/25 1059   Weight: 59.1 kg (130 lb 6.4 oz)     Height: 5' 2\" (157.5 cm)   Body mass index is 23.85 kg/m².    Physical Exam  Vitals and nursing note reviewed.   Constitutional:       General: She is not in acute distress.     Appearance: She is well-developed. She is not diaphoretic.   HENT:      Head: Normocephalic and atraumatic.   Neck:      Thyroid: No thyromegaly.      Vascular: No carotid bruit or JVD.     Cardiovascular:      Rate and Rhythm: Normal rate and regular rhythm.      Pulses: " Intact distal pulses.           Radial pulses are 2+ on the right side and 2+ on the left side.      Heart sounds: Normal heart sounds, S1 normal and S2 normal. No murmur heard.  Pulmonary:      Effort: Pulmonary effort is normal.      Breath sounds: Normal breath sounds.   Abdominal:      General: There is no distension.      Palpations: Abdomen is soft.      Tenderness: There is no abdominal tenderness.     Musculoskeletal:         General: Normal range of motion.      Cervical back: Normal range of motion and neck supple.   Lymphadenopathy:      Cervical: No cervical adenopathy.     Skin:     General: Skin is warm and dry.     Neurological:      Mental Status: She is alert and oriented to person, place, and time.      Cranial Nerves: No cranial nerve deficit.     Psychiatric:         Behavior: Behavior normal.                     [1]   Past Medical History:  Diagnosis Date    Depression     Esophageal stricture     History of gastroesophageal reflux (GERD)     Iron deficiency anemia     Meniere disease    [2]   Family History  Problem Relation Name Age of Onset    Stroke Mother      Hypertension Mother      Emphysema Father      COPD Father     [3]   Past Surgical History:  Procedure Laterality Date    COLONOSCOPY  2012    FL LUMBAR PUNCTURE DIAGNOSTIC  12/19/2024    GALLBLADDER SURGERY      HYSTERECTOMY      OTHER SURGICAL HISTORY      Endoscopy of stricture 2010, 2012, 5/2014 - Esophageal ulcer; 7/2014 - Esophageal ulcer; 10/2015 (Dr. Jones) - Esophageal stricture; 10/2016; 11/2017 - Stricture   [4]   Current Outpatient Medications:     carvedilol (COREG) 3.125 mg tablet, Take 1 tablet (3.125 mg total) by mouth 2 (two) times a day with meals, Disp: 180 tablet, Rfl: 3    Cholecalciferol 25 MCG (1000 UT) CHEW, Chew 1 tablet in the morning., Disp: , Rfl:     divalproex sodium (DEPAKOTE SPRINKLE) 125 MG capsule, Take 250 mg by mouth in the morning and 250 mg in the evening., Disp: , Rfl:     DULoxetine (CYMBALTA)  30 mg delayed release capsule, Take 1 capsule by mouth in the morning, Disp: , Rfl:     Empagliflozin (Jardiance) 10 MG TABS tablet, Take 1 tablet (10 mg total) by mouth every morning, Disp: 90 tablet, Rfl: 3    furosemide (LASIX) 20 mg tablet, Take 1 tablet (20 mg total) by mouth daily, Disp: 90 tablet, Rfl: 3    OLANZapine (ZyPREXA) 7.5 mg tablet, Take 7.5 mg by mouth, Disp: , Rfl:

## 2025-06-09 ENCOUNTER — OFFICE VISIT (OUTPATIENT)
Dept: CARDIOLOGY CLINIC | Facility: CLINIC | Age: 75
End: 2025-06-09
Payer: MEDICARE

## 2025-06-09 VITALS
SYSTOLIC BLOOD PRESSURE: 112 MMHG | WEIGHT: 130.4 LBS | BODY MASS INDEX: 24 KG/M2 | HEIGHT: 62 IN | DIASTOLIC BLOOD PRESSURE: 62 MMHG | OXYGEN SATURATION: 98 % | HEART RATE: 73 BPM | TEMPERATURE: 97.7 F

## 2025-06-09 DIAGNOSIS — I10 HYPERTENSION, ESSENTIAL: ICD-10-CM

## 2025-06-09 DIAGNOSIS — I50.32 HEART FAILURE WITH RECOVERED EJECTION FRACTION (HFRECEF) (HCC): ICD-10-CM

## 2025-06-09 DIAGNOSIS — I51.81 TAKOTSUBO CARDIOMYOPATHY: Primary | ICD-10-CM

## 2025-06-09 DIAGNOSIS — E78.00 HYPERCHOLESTEROLEMIA: Chronic | ICD-10-CM

## 2025-06-09 PROBLEM — R79.89 ELEVATED TROPONIN: Status: RESOLVED | Noted: 2025-02-13 | Resolved: 2025-06-09

## 2025-06-09 PROBLEM — J96.01 ACUTE HYPOXIC RESPIRATORY FAILURE (HCC): Status: RESOLVED | Noted: 2025-02-11 | Resolved: 2025-06-09

## 2025-06-09 PROBLEM — J40 BRONCHITIS: Status: RESOLVED | Noted: 2022-11-25 | Resolved: 2025-06-09

## 2025-06-09 PROCEDURE — 99214 OFFICE O/P EST MOD 30 MIN: CPT | Performed by: NURSE PRACTITIONER

## 2025-06-09 RX ORDER — FUROSEMIDE 20 MG/1
20 TABLET ORAL DAILY
Qty: 90 TABLET | Refills: 3 | Status: SHIPPED | OUTPATIENT
Start: 2025-06-09

## 2025-06-09 NOTE — ASSESSMENT & PLAN NOTE
History of hypertension  BP is borderline hypotensive  Continue carvedilol 3.125 mg BID. Will monitor with reducing furosemide dose to 20 mg daily

## 2025-06-09 NOTE — ASSESSMENT & PLAN NOTE
Wt Readings from Last 3 Encounters:   06/09/25 59.1 kg (130 lb 6.4 oz)   05/23/25 58.1 kg (128 lb)   04/10/25 58.1 kg (128 lb)     Admitted 2/11 with multifocal pneumonia. Developed flash pulmonary edema and found with severely reduced EF and findings consistent with stress induced cardiomyopathy.  Started on GDMT. Currently euvolemic on furosemide 40 mg daily and Jardiance 10 mg daily.  OK to reduce furosemide to 20 mg daily and will monitor closely. If stable can reduce to PRN dosing.   Continue 2 g sodium diet, daily weights.  Reviewed s/s to report.  Hold Jardiance for 4 days prior to surgical procedure.

## 2025-06-09 NOTE — ASSESSMENT & PLAN NOTE
Admitted with multifocal PNA 2/11/2025.  Echo 2/12/2025 shows LVEF 15% with apical akinesis consistent with stress-induced cardiomyopathy.  Started on GDMT.  Repeat limited echo 2/17 showed LVEF 45%.  Continue carvedilol 3.125 mg BID. Lisinopril d/c by PCP due to hypotension.  Continue Jardiance 10 mg daily.  Repeat echo 5/23/2025 shows LVEF 65% with no obvious wall motion abnormality.  Reviewed rationale for continuing Coreg and Jardiance.  Will reduce diuretic as she is not in heart failure.

## 2025-06-09 NOTE — PATIENT INSTRUCTIONS
Reduce furosemide (Lasix) to 20 mg daily. If you do not swell and weight stays stable we can even try cutting down to just as needed.  Continue carvedilol and Jardiance.  Your repeat ultrasound shows normal heart function! The carvedilol and Jardiance will continue to help protect the heart

## 2025-07-01 ENCOUNTER — ANESTHESIA EVENT (OUTPATIENT)
Dept: GASTROENTEROLOGY | Facility: HOSPITAL | Age: 75
End: 2025-07-01
Payer: MEDICARE

## 2025-07-01 ENCOUNTER — HOSPITAL ENCOUNTER (OUTPATIENT)
Dept: GASTROENTEROLOGY | Facility: HOSPITAL | Age: 75
Setting detail: OUTPATIENT SURGERY
Discharge: HOME | End: 2025-07-01
Attending: HOSPITALIST
Payer: MEDICARE

## 2025-07-01 ENCOUNTER — ANESTHESIA (OUTPATIENT)
Dept: GASTROENTEROLOGY | Facility: HOSPITAL | Age: 75
End: 2025-07-01
Payer: MEDICARE

## 2025-07-01 VITALS
HEIGHT: 62 IN | DIASTOLIC BLOOD PRESSURE: 60 MMHG | TEMPERATURE: 98.1 F | HEART RATE: 79 BPM | OXYGEN SATURATION: 96 % | SYSTOLIC BLOOD PRESSURE: 137 MMHG | WEIGHT: 128 LBS | BODY MASS INDEX: 23.55 KG/M2 | RESPIRATION RATE: 19 BRPM

## 2025-07-01 DIAGNOSIS — Z12.11 ENCOUNTER FOR SCREENING FOR MALIGNANT NEOPLASM OF COLON: ICD-10-CM

## 2025-07-01 DIAGNOSIS — R13.19 OTHER DYSPHAGIA: ICD-10-CM

## 2025-07-01 PROCEDURE — C1769 GUIDE WIRE: HCPCS

## 2025-07-01 PROCEDURE — C1726 CATH, BAL DIL, NON-VASCULAR: HCPCS

## 2025-07-01 RX ORDER — SODIUM CHLORIDE, SODIUM LACTATE, POTASSIUM CHLORIDE, CALCIUM CHLORIDE 600; 310; 30; 20 MG/100ML; MG/100ML; MG/100ML; MG/100ML
20 INJECTION, SOLUTION INTRAVENOUS CONTINUOUS
Status: DISCONTINUED | OUTPATIENT
Start: 2025-07-01 | End: 2025-07-05 | Stop reason: HOSPADM

## 2025-07-01 RX ORDER — LIDOCAINE HYDROCHLORIDE 10 MG/ML
0.5 INJECTION, SOLUTION EPIDURAL; INFILTRATION; INTRACAUDAL; PERINEURAL ONCE AS NEEDED
Status: DISCONTINUED | OUTPATIENT
Start: 2025-07-01 | End: 2025-07-05 | Stop reason: HOSPADM

## 2025-07-01 RX ORDER — LIDOCAINE HYDROCHLORIDE 10 MG/ML
INJECTION, SOLUTION EPIDURAL; INFILTRATION; INTRACAUDAL; PERINEURAL AS NEEDED
Status: DISCONTINUED | OUTPATIENT
Start: 2025-07-01 | End: 2025-07-01

## 2025-07-01 RX ORDER — PROPOFOL 10 MG/ML
INJECTION, EMULSION INTRAVENOUS AS NEEDED
Status: DISCONTINUED | OUTPATIENT
Start: 2025-07-01 | End: 2025-07-01

## 2025-07-01 RX ORDER — SODIUM CHLORIDE, SODIUM LACTATE, POTASSIUM CHLORIDE, CALCIUM CHLORIDE 600; 310; 30; 20 MG/100ML; MG/100ML; MG/100ML; MG/100ML
20 INJECTION, SOLUTION INTRAVENOUS CONTINUOUS
Status: CANCELLED | OUTPATIENT
Start: 2025-07-01

## 2025-07-01 RX ORDER — LIDOCAINE HYDROCHLORIDE 10 MG/ML
0.5 INJECTION, SOLUTION EPIDURAL; INFILTRATION; INTRACAUDAL; PERINEURAL ONCE AS NEEDED
Status: CANCELLED | OUTPATIENT
Start: 2025-07-01

## 2025-07-01 RX ADMIN — PROPOFOL 100 MG: 10 INJECTION, EMULSION INTRAVENOUS at 12:53

## 2025-07-01 RX ADMIN — Medication 40 MG: at 12:55

## 2025-07-01 RX ADMIN — LIDOCAINE HYDROCHLORIDE 50 MG: 10 INJECTION, SOLUTION EPIDURAL; INFILTRATION; INTRACAUDAL; PERINEURAL at 12:53

## 2025-07-01 RX ADMIN — PROPOFOL 150 MCG/KG/MIN: 10 INJECTION, EMULSION INTRAVENOUS at 12:54

## 2025-07-01 RX ADMIN — SODIUM CHLORIDE, SODIUM LACTATE, POTASSIUM CHLORIDE, AND CALCIUM CHLORIDE: .6; .31; .03; .02 INJECTION, SOLUTION INTRAVENOUS at 12:52

## 2025-07-01 NOTE — ANESTHESIA POSTPROCEDURE EVALUATION
Post-Op Assessment Note    CV Status:  Stable    Pain management: adequate       Mental Status:  Sleepy   Hydration Status:  Euvolemic   PONV Controlled:  Controlled   Airway Patency:  Patent  Two or more mitigation strategies used for obstructive sleep apnea   Post Op Vitals Reviewed: Yes    No anethesia notable event occurred.    Staff: CRNA           Last Filed PACU Vitals:  Vitals Value Taken Time   Temp 98.7 °F (37.1 °C) 07/01/25 13:40   Pulse 72 07/01/25 13:41   /59 07/01/25 13:41   Resp 17 07/01/25 13:41   SpO2 100 % 07/01/25 13:41   Vitals shown include unfiled device data.    Modified Berta:     Vitals Value Taken Time   Activity 0 07/01/25 13:40   Respiration 2 07/01/25 13:40   Circulation 2 07/01/25 13:40   Consciousness 0 07/01/25 13:40   Oxygen Saturation 1 07/01/25 13:40     Modified Berta Score: 5

## 2025-07-01 NOTE — H&P
Procedure(s):  Colonoscopy with indication(s) of CRC sceening  Esophagogastroduodenuoscopy with the indication(s) of dysphagia      Vitals:    07/01/25 1140   BP: 162/92   Pulse: 86   Resp: 18   Temp: 98.2 °F (36.8 °C)   SpO2: 96%       Physical Exam:  Physical Exam    Eyes:      Conjunctiva/sclera: Conjunctivae normal.       Cardiovascular:      Rate and Rhythm: Normal rate.   Pulmonary:      Effort: Pulmonary effort is normal.   Abdominal:      Palpations: Abdomen is soft.     Neurological:      General: No focal deficit present.      Mental Status: She is alert.     Psychiatric:         Mood and Affect: Mood normal.              Endoscopy Pre-Procedure Assessment:  Prior to the procedure, the patient is identified.  The patient's history, medications, and allergies have been reviewed.  The patient is competent.     Consent:    We have discussed the procedure in detail. We reviewed risks, benefits and alternative as well as potentional complications including and not limited to missed lesion or polyp,medication side effect , infection, bleeding, perforation and the potential need for surgery, ICU admission, CPR, as well as the need for blood product transfusion. Patient verbalized understanding and agreement. All patient questions were answered.     After reviewed the risks and benefits, the patient is deemed in satisfactory condition to undergo the procedure.  The anesthesia plan is to use monitored anesthesia care (MAC).      07/01/25

## 2025-07-01 NOTE — ANESTHESIA PREPROCEDURE EVALUATION
Procedure:  EGD  COLONOSCOPY    Relevant Problems   CARDIO   (+) Hypercholesterolemia   (+) Hypertension, essential      GI/HEPATIC   (+) Pharyngoesophageal dysphagia      HEMATOLOGY   (+) Iron deficiency anemia secondary to inadequate dietary iron intake      NEURO/PSYCH   (+) Anxiety   (+) Current moderate episode of major depressive disorder without prior episode (HCC)   (+) Generalized anxiety disorder      Cardiovascular/Peripheral Vascular   (+) Takotsubo cardiomyopathy      Hx of Takotsubo cardiomyopathy, EF recovered    Denies recent fever, cough or other symptom of upper respiratory tract infection.    Confirmed NPO appropriate    Physical Exam    Airway     Mallampati score: II  TM Distance: >3 FB  Neck ROM: full  Mouth opening: >= 4 cm      Cardiovascular      Dental    edentulous    Pulmonary      Neurological    She appears awake, alert and oriented x3.      Other Findings  post-pubertal.    5/23/25 TTE: Moderate focal basal septal hypertrophy. EF 65%.  Compared to February 2025 study report, LV function has normalized now. Limited imaging showed aortic valve calcification with no more than minimal aortic stenosis.    Anesthesia Plan  ASA Score- 3     Anesthesia Type- IV sedation with anesthesia with ASA Monitors.         Additional Monitors:     Airway Plan: natural airway.           Plan Factors-Exercise tolerance (METS): >4 METS.Exercise comment: Able to climb stairs from basement without cardiopulmonary limitation.    Chart reviewed.   Existing labs reviewed.     Patient is not a current smoker (Quit tobacco altogether in 2024).  Patient did not smoke on day of surgery.            Induction- intravenous.    Postoperative Plan- .   Monitoring Plan - Monitoring plan - standard ASA monitoring  Post Operative Pain Plan - non-opiod analgesics        Informed Consent- Anesthetic plan and risks discussed with patient.        NPO Status:  Vitals Value Taken Time   Date of last liquid 07/01/25 07/01/25 11:22    Time of last liquid 0900 07/01/25 11:22   Date of last solid 06/29/25 07/01/25 11:22   Time of last solid 1700 07/01/25 11:22

## 2025-08-11 ENCOUNTER — TELEPHONE (OUTPATIENT)
Age: 75
End: 2025-08-11